# Patient Record
Sex: FEMALE | Race: WHITE | NOT HISPANIC OR LATINO | Employment: OTHER | URBAN - METROPOLITAN AREA
[De-identification: names, ages, dates, MRNs, and addresses within clinical notes are randomized per-mention and may not be internally consistent; named-entity substitution may affect disease eponyms.]

---

## 2017-01-06 ENCOUNTER — TELEPHONE (OUTPATIENT)
Dept: VASCULAR LAB | Facility: MEDICAL CENTER | Age: 82
End: 2017-01-06

## 2017-01-06 NOTE — TELEPHONE ENCOUNTER
Pt states she is in [place] for the next few months.  She states she has an MD there that is taking the INR and dosing her.  She expects to be back in [place] around May of this year.  Will contact pt in 1-2 months and follow up her therapy is still being monitored by her MD.    Aly Escobedo, KPD

## 2017-03-02 ENCOUNTER — TELEPHONE (OUTPATIENT)
Dept: VASCULAR LAB | Facility: MEDICAL CENTER | Age: 82
End: 2017-03-02

## 2017-03-02 NOTE — TELEPHONE ENCOUNTER
[facility]    Confirmed with pt she is seeing an MD to adjust her warfarin.  She states she will be back early May and will obtain another INR at that time.    Aly Escobedo, KPD

## 2017-05-12 ENCOUNTER — ANTICOAGULATION MONITORING (OUTPATIENT)
Dept: VASCULAR LAB | Facility: MEDICAL CENTER | Age: 82
End: 2017-05-12

## 2017-05-12 NOTE — PROGRESS NOTES
[facility]    Called pt to confirm if she has moved back to the area or if the clinic could resume monitoring of INR.  Pt states she is staying in [place] and will continue to have warfarin monitored by her MD at her current location.    Pt d/c'd from [facility].    Aly Escobedo, PHARMD   CC: Michael Bloch, MD

## 2019-07-08 ENCOUNTER — OFFICE VISIT (OUTPATIENT)
Dept: URGENT CARE | Facility: PHYSICIAN GROUP | Age: 84
End: 2019-07-08
Payer: MEDICARE

## 2019-07-08 VITALS
OXYGEN SATURATION: 91 % | BODY MASS INDEX: 20.24 KG/M2 | TEMPERATURE: 97.6 F | WEIGHT: 110 LBS | DIASTOLIC BLOOD PRESSURE: 64 MMHG | HEART RATE: 78 BPM | SYSTOLIC BLOOD PRESSURE: 118 MMHG | HEIGHT: 62 IN

## 2019-07-08 DIAGNOSIS — A09 DIARRHEA, TRAVELERS': ICD-10-CM

## 2019-07-08 PROCEDURE — 99203 OFFICE O/P NEW LOW 30 MIN: CPT | Performed by: NURSE PRACTITIONER

## 2019-07-08 RX ORDER — FUROSEMIDE 40 MG/1
40 TABLET ORAL DAILY
COMMUNITY
Start: 2019-06-10 | End: 2021-07-29 | Stop reason: SDUPTHER

## 2019-07-08 RX ORDER — CIPROFLOXACIN 250 MG/1
250 TABLET, FILM COATED ORAL 2 TIMES DAILY
Qty: 6 TAB | Refills: 0 | Status: SHIPPED | OUTPATIENT
Start: 2019-07-08 | End: 2019-07-11

## 2019-07-08 RX ORDER — WARFARIN SODIUM 4 MG/1
TABLET ORAL
COMMUNITY
Start: 2019-04-26 | End: 2020-10-03

## 2019-07-08 ASSESSMENT — ENCOUNTER SYMPTOMS
VOMITING: 0
HEADACHES: 0
ABDOMINAL PAIN: 1
FEVER: 0
DIARRHEA: 1
NAUSEA: 0
DIZZINESS: 0
MYALGIAS: 0
CHILLS: 0

## 2019-07-08 NOTE — PROGRESS NOTES
"Subjective:      Lindsay Landrum is a 86 y.o. female who presents with Diarrhea (travel outside the country x6days)            HPI New. 86 year old female with diarrhea x 6 days since returning from trip to [facility]. She denies fever, chills, myalgia, nausea or vomiting. She has no blood or pus in stool. She reports abdominal cramping with this. No other sick contacts. She has not taken any medication for this. Reports decreased appetite.  Patient has no known allergies.  Current Outpatient Prescriptions on File Prior to Visit   Medication Sig Dispense Refill   • levothyroxine (SYNTHROID) 50 MCG Tab Take 1 Tab by mouth every day. 30 Tab 0   • lovastatin (MEVACOR) 20 MG Tab Take 1 Tab by mouth every bedtime. 30 Tab 0   • warfarin (COUMADIN) 5 MG TABS Take 1 -1 1/2 tablets daily or as directed by [facility]. 90 Tab 4     No current facility-administered medications on file prior to visit.      Social History     Social History   • Marital status: Single     Spouse name: N/A   • Number of children: N/A   • Years of education: N/A     Occupational History   • Not on file.     Social History Main Topics   • Smoking status: Never Smoker   • Smokeless tobacco: Never Used   • Alcohol use Yes   • Drug use: No   • Sexual activity: Not on file     Other Topics Concern   • Not on file     Social History Narrative   • No narrative on file     family history is not on file.      Review of Systems   Constitutional: Negative for chills and fever.   Gastrointestinal: Positive for abdominal pain and diarrhea. Negative for nausea and vomiting.   Genitourinary: Negative.    Musculoskeletal: Negative for myalgias.   Neurological: Negative for dizziness and headaches.          Objective:     /64   Pulse 78   Temp 36.4 °C (97.6 °F) (Temporal)   Ht 1.575 m (5' 2\")   Wt 49.9 kg (110 lb)   SpO2 91%   BMI 20.12 kg/m²      Physical Exam   Constitutional: She is oriented to person, place, and time. She appears well-developed and " well-nourished. No distress.   Cardiovascular: Normal rate, regular rhythm and normal heart sounds.    No murmur heard.  Pulmonary/Chest: Effort normal and breath sounds normal. No respiratory distress.   Abdominal: Soft. Bowel sounds are normal. There is no tenderness. There is no CVA tenderness.   Musculoskeletal: Normal range of motion.   Moves all 4 extremities normally   Neurological: She is alert and oriented to person, place, and time.   Skin: Skin is warm and dry.   Psychiatric: She has a normal mood and affect. Her behavior is normal. Thought content normal.   Vitals reviewed.              Assessment/Plan:     1. Diarrhea, travelers'  ciprofloxacin (CIPRO) 250 MG Tab     No longer on coumadin.  Differential diagnosis, natural history, supportive care, and indications for immediate follow-up discussed at length.

## 2020-07-01 ENCOUNTER — APPOINTMENT (OUTPATIENT)
Dept: RADIOLOGY | Facility: MEDICAL CENTER | Age: 85
End: 2020-07-01
Attending: EMERGENCY MEDICINE
Payer: MEDICARE

## 2020-07-01 ENCOUNTER — HOSPITAL ENCOUNTER (EMERGENCY)
Facility: MEDICAL CENTER | Age: 85
End: 2020-07-01
Attending: EMERGENCY MEDICINE
Payer: MEDICARE

## 2020-07-01 ENCOUNTER — APPOINTMENT (OUTPATIENT)
Dept: URGENT CARE | Facility: CLINIC | Age: 85
End: 2020-07-01
Payer: MEDICARE

## 2020-07-01 VITALS
SYSTOLIC BLOOD PRESSURE: 115 MMHG | OXYGEN SATURATION: 99 % | HEIGHT: 62 IN | BODY MASS INDEX: 20.69 KG/M2 | RESPIRATION RATE: 16 BRPM | TEMPERATURE: 97.9 F | WEIGHT: 112.43 LBS | HEART RATE: 65 BPM | DIASTOLIC BLOOD PRESSURE: 57 MMHG

## 2020-07-01 DIAGNOSIS — L03.313 CELLULITIS OF CHEST WALL: ICD-10-CM

## 2020-07-01 DIAGNOSIS — Z51.89 ENCOUNTER FOR WOUND RE-CHECK: ICD-10-CM

## 2020-07-01 PROCEDURE — 99283 EMERGENCY DEPT VISIT LOW MDM: CPT

## 2020-07-01 PROCEDURE — 71045 X-RAY EXAM CHEST 1 VIEW: CPT

## 2020-07-01 RX ORDER — AMOXICILLIN 500 MG/1
1000 CAPSULE ORAL 2 TIMES DAILY
Qty: 20 CAP | Refills: 0 | Status: SHIPPED | OUTPATIENT
Start: 2020-07-01 | End: 2020-07-06

## 2020-07-01 RX ORDER — AMOXICILLIN 500 MG/1
1000 CAPSULE ORAL 2 TIMES DAILY
Qty: 20 CAP | Refills: 0 | Status: SHIPPED | OUTPATIENT
Start: 2020-07-01 | End: 2020-07-01 | Stop reason: SDUPTHER

## 2020-07-01 NOTE — ED PROVIDER NOTES
ED Provider Note    Scribed for Brittnee Beyer M.D. by Quinton Meng. 7/1/2020, 10:04 AM.    Primary care provider: None noted  Means of arrival: Walk in  History obtained from: Patient  History limited by: None    CHIEF COMPLAINT  Chief Complaint   Patient presents with   • Wound Check       HPI  Lindsay Landrum is a 87 y.o. female with history of Atrial fibrillation, hyperlipidemia, and chronic anticoagulation, who presents to the Emergency Department for evaluation of moderate itchiness to left chest incisional site of old pacemaker onset last night. She reports she had her pacemaker removed in [place] on 4/15/20 secondary to infection in her left chest and had a new one placed in her right groin. She states that she last had her incision site checked in mid-May, at which time the incision was draining fluid. She is unsure who her Cardiologist is, but says she was followed by Dr. Meza (Cardiology) and Dr. Connelly (Cardiology) in the past. She has an appointment with a new Cardiologist in August, 2020 for pacemaker recheck. She admits to additional symptoms of left chest incision site redness (last night), and a scab to the right groin incision site, but denies fever or nausea. She notes alleviation of itchiness with distraction. She denies known drug allergies.     PPE Note: I personally donned full PPE for all patient encounters during this visit, including an N95 respirator mask, gloves, and goggles.     Scribe remained outside the patient's room and did not have any contact with the patient for the duration of patient encounter.      REVIEW OF SYSTEMS  Pertinent positives include itchiness to left chest incisional site of old pacemaker, left chest incision site redness (last night), and a scab to the right groin incision site. Pertinent negatives include no fever or nausea. As above, all other systems reviewed and are negative.   See HPI for further details.     PAST MEDICAL HISTORY  Past Medical  "History:   Diagnosis Date   • AF (atrial fibrillation) (HCC) 5/21/2012   • Chronic anticoagulation 5/21/2012   • Hyperlipidemia 5/21/2012   • Hypothyroidism 5/21/2012   • Vitamin d deficiency 5/21/2012       SURGICAL HISTORY  History reviewed. No pertinent surgical history.    SOCIAL HISTORY  Social History     Tobacco Use   • Smoking status: Never Smoker   • Smokeless tobacco: Never Used   Substance Use Topics   • Alcohol use: Yes   • Drug use: No      Social History     Substance and Sexual Activity   Drug Use No       FAMILY HISTORY  History reviewed. No pertinent family history.    CURRENT MEDICATIONS  Current Outpatient Medications:   •  Potassium 75 MG Tab, Take  by mouth., Disp: , Rfl:   •  furosemide (LASIX) 40 MG Tab, , Disp: , Rfl:   •  JANTOVEN 4 MG Tab, , Disp: , Rfl:   •  levothyroxine (SYNTHROID) 50 MCG Tab, Take 1 Tab by mouth every day., Disp: 30 Tab, Rfl: 0  •  lovastatin (MEVACOR) 20 MG Tab, Take 1 Tab by mouth every bedtime., Disp: 30 Tab, Rfl: 0  •  warfarin (COUMADIN) 5 MG TABS, Take 1 -1 1/2 tablets daily or as directed by [facility]., Disp: 90 Tab, Rfl: 4     ALLERGIES  No Known Allergies    PHYSICAL EXAM  VITAL SIGNS: /57   Pulse 62   Temp 36.7 °C (98.1 °F) (Temporal)   Resp 16   Ht 1.575 m (5' 2\")   Wt 51 kg (112 lb 7 oz)   SpO2 100%   BMI 20.56 kg/m²   Vitals reviewed.  Consitutional: Well-developed, well-nourished. Negative for: distress.  HENT: Normocephalic, right external ear normal, left external ear normal, oropharynx clear and moist.  Eyes: Conjunctivae normal, extraocular movements normal. Negative for: discharge in right and left eye, icterus.  Neck: Range of motion normal, supple. Negative for cervical adenopathy.  Abdominal: Soft, bowel sounds normal. Negative for: distention, tenderness, rebound, guarding.  Musculoskeletal: Normal range of motion. Negative for edema.  Neurological: Alert and oriented x3. No focal deficits.  Skin: Left groin incision has slight " "crusting, no erythema or induration, and no active discharge;The pocket is not boggy, tender, or hot. Surgical incision to left upper chest is warm, erythematous, scaly, and there appears to be an area of dehiscence with no active drainage.   Psych: Mood/affect normal, behavior normal, judgment normal.    DIAGNOSTIC STUDIES / PROCEDURES    RADIOLOGY  DX-CHEST-PORTABLE (1 VIEW)   Final Result      1.  Mild cardiomegaly.      2.  Blunted right costophrenic angle likely represents a small pleural effusion.        The radiologist's interpretation of all radiological studies have been reviewed by me.    COURSE & MEDICAL DECISION MAKING  Nursing notes, VS, PMSFHx reviewed in chart.    10:04 AM Patient seen and examined at bedside. The patient presents with possible wound infection and the differential diagnosis includes but is not limited to retained hardware, cellulitis. Discussed my plan to consult Cardiology. Patient verbalizes support and understanding with the plan of care.     10:17 AM - Paged Cardiology.      10:21 AM - I discussed the patient's case and the above findings with Dr. Schuler (Cardiology) who recommended I further evaluate the patient with a chest x-ray to check for any retained metal, as the surgery was not completed here. She agrees for the plan for outpatient antibiotic treatment if the chest x-ray is clear.     10:25 AM - Ordered DX-Chest.     10:49 AM - I reevaluated the patient at bedside. I discussed the patient's chest x-ray, which is reassuring. I discussed plan for discharge and follow up with Dr. Schuler as outlined below, with prescriptions for amoxicillin. The patient verbalizes they feel comfortable going home. The patient is stable for discharge at this time and will return for any new or worsening symptoms. Patient verbalizes understanding and support with my plan for discharge.      /57   Pulse 62   Temp 36.7 °C (98.1 °F) (Temporal)   Resp 16   Ht 1.575 m (5' 2\")   Wt 51 kg " (112 lb 7 oz)   SpO2 100%   BMI 20.56 kg/m²      The patient will return for new or worsening symptoms and is stable at the time of discharge.    DISPOSITION:  Patient will be discharged home in stable condition.    FOLLOW UP:  Areltte Schuler M.D.  [address]  [address]  [place] [address]  [phone]    Schedule an appointment as soon as possible for a visit   For wound re-check      OUTPATIENT MEDICATIONS:  Current Discharge Medication List      START taking these medications    Details   amoxicillin (AMOXIL) 500 MG Cap Take 2 Caps by mouth 2 times a day for 5 days.  Qty: 20 Cap, Refills: 0              FINAL IMPRESSION  1. Cellulitis of chest wall    2. Encounter for wound re-check          Quinton MCCARTHY (Sammy), am scribing for, and in the presence of, Brittnee Beyer M.D..    Electronically signed by: Quinton Meng (Sammy), 7/1/2020    Brittnee MCCARTHY M.D. personally performed the services described in this documentation, as scribed by Quinton Meng in my presence, and it is both accurate and complete.    The note accurately reflects work and decisions made by me.  Brittnee Beyer M.D.  7/1/2020  10:56 AM

## 2020-07-01 NOTE — ED TRIAGE NOTES
"Pt ambulatory to triage, pt c/o \" incisional pain to left old pacemaker insertion site\" . Pt had her Pacemaker removed April 15th from her left chest due to infection and placed to rt groin. Last had her incision site checked in May, and it was \" draining at the time\" , but she thought by now it would better... Had the surgery done in [place]. . Sl erythema noted to left chest wall incision. Pt denies Chest pains or other concerns   "

## 2020-07-01 NOTE — ED NOTES
Pt given discharge instructions/prescription sent to pharm of choosing/ home care instructions explained, pt verbalized understanding of instructions given/pt understands the importance of follow up, pt ambulatory to ER james.

## 2020-07-01 NOTE — ED NOTES
"Pt steady on ambulation to Er 62. Pt changing into gown at this time. Pt has \"old pacemaker site to be checked on left chest area and new site on right groin\". Pt has surgery done in April 2020 and wanted a recheck. Skin on left chest appears dry and flaky. NAD noted. VSS in triage   "

## 2020-07-02 ENCOUNTER — TELEPHONE (OUTPATIENT)
Dept: CARDIOLOGY | Facility: MEDICAL CENTER | Age: 85
End: 2020-07-02

## 2020-07-02 NOTE — TELEPHONE ENCOUNTER
----- Message from [facility] Med [facility] sent at 7/2/2020 11:30 AM PDT -----  Regarding: FW: 1 WEEK WOUND CHECK  Delbert Platt,    Here is the patient that will need to be scheduled with EP-- ?? Of Micra implant s/p device extraction done in [place].    Wyatt Gallegos  ----- Message -----  From: Corina Ba  Sent: 7/1/2020   1:55 PM PDT  To: [facility]  Subject: 1 WEEK WOUND CHECK                               Good Afternoon,          Patient had pacer placed in [place]. She is now in [place] and was just seen in ER for her wound. ER informed patient to have a wound check within a week. Patient is not established with office but has appt in aug 5 for pacer check and NP appt. Is there any way we can get patient in within a week to get this done?      Thank you,  Corina

## 2020-07-09 ENCOUNTER — OFFICE VISIT (OUTPATIENT)
Dept: CARDIOLOGY | Facility: MEDICAL CENTER | Age: 85
End: 2020-07-09
Payer: MEDICARE

## 2020-07-09 ENCOUNTER — DOCUMENTATION (OUTPATIENT)
Dept: CARDIOLOGY | Facility: MEDICAL CENTER | Age: 85
End: 2020-07-09

## 2020-07-09 VITALS
HEART RATE: 72 BPM | DIASTOLIC BLOOD PRESSURE: 70 MMHG | SYSTOLIC BLOOD PRESSURE: 130 MMHG | OXYGEN SATURATION: 95 % | BODY MASS INDEX: 20.12 KG/M2 | WEIGHT: 110 LBS

## 2020-07-09 DIAGNOSIS — I48.11 LONGSTANDING PERSISTENT ATRIAL FIBRILLATION (HCC): Chronic | ICD-10-CM

## 2020-07-09 PROCEDURE — 99204 OFFICE O/P NEW MOD 45 MIN: CPT | Mod: 25 | Performed by: INTERNAL MEDICINE

## 2020-07-09 PROCEDURE — 93279 PRGRMG DEV EVAL PM/LDLS PM: CPT | Performed by: INTERNAL MEDICINE

## 2020-07-09 RX ORDER — DOXYCYCLINE HYCLATE 50 MG/1
CAPSULE ORAL
COMMUNITY
Start: 2020-05-01 | End: 2020-08-12

## 2020-07-09 NOTE — PROGRESS NOTES
Request for records from Dr.Leon Arvizu office  P:[phone]  F:[phone]    Confirmation scanned to media    Request for All cardiac records/Implant/explant of device    Patient has Device implanted in groin area (right side) [facility]

## 2020-07-09 NOTE — PROGRESS NOTES
"Arrhythmia Clinic Note (New patient)     DOS: 7/9/2020    Referring physician: Dr Beyer    Chief complaint/Reason for consult: Device site pain    HPI: 87-year-old female with chronic atrial fibrillation, slow ventricular response, status post dual-chamber pacemaker earlier this year, possible early pacemaker infection with extraction, right groin pacemaker implanted (transvenous lead with generator subcutaneous in low abdomen), referred for evaluation.  Patient is not certain of the dates and indications for her procedures, however she notes that she believes her first implanted pacemaker was in April and was a dual-chamber pacemaker to left chest.  Due to poor wound healing, she notes that this was removed.  She says that her physicians told her she was unable to have a pacemaker implanted in the right axillary vein due to \" vein tortuosity\".  She proceeded to have right groin pacemaker implanted.  Since this procedure, she has noted that she has had some left shoulder itching and redness at her prior incision site, she was placed on oral antibiotics last week and this has improved.  Sometimes she gets sharp stinging sensation there which lasts only a second, she says she is not worried by the pain.  She does have some discomfort as she is thin regarding her right groin generator, and is wondering what she can have done about this.    ROS (+ highlighted in bold):  Constitutional: Fevers/chills/fatigue/weightloss  HEENT: Blurry vision/eye pain/sore throat/hearing loss  Respiratory: Shortness of breath/cough  Cardiovascular: Chest pain/palpitations/edema/orthopnea/syncope  GI: Nausea/vomitting/diarrhea  MSK: Arthralgias/myagias/muscle weakness  Skin: Rash/sores  Neurological: Numbness/tremors/vertigo  Endocrine: Excessive thirst/polyuria/cold intolerance/heat intolerance  Psych: Depression/anxiety    Past Medical History:   Diagnosis Date   • AF (atrial fibrillation) ([facility]) 5/21/2012   • Chronic anticoagulation " 5/21/2012   • Hyperlipidemia 5/21/2012   • Hypothyroidism 5/21/2012   • Vitamin d deficiency 5/21/2012       No past surgical history on file.    Social History     Socioeconomic History   • Marital status: Single     Spouse name: Not on file   • Number of children: Not on file   • Years of education: Not on file   • Highest education level: Not on file   Occupational History   • Not on file   Social Needs   • Financial resource strain: Not on file   • Food insecurity     Worry: Not on file     Inability: Not on file   • Transportation needs     Medical: Not on file     Non-medical: Not on file   Tobacco Use   • Smoking status: Never Smoker   • Smokeless tobacco: Never Used   Substance and Sexual Activity   • Alcohol use: Yes   • Drug use: No   • Sexual activity: Not on file   Lifestyle   • Physical activity     Days per week: Not on file     Minutes per session: Not on file   • Stress: Not on file   Relationships   • Social connections     Talks on phone: Not on file     Gets together: Not on file     Attends Zoroastrian service: Not on file     Active member of club or organization: Not on file     Attends meetings of clubs or organizations: Not on file     Relationship status: Not on file   • Intimate partner violence     Fear of current or ex partner: Not on file     Emotionally abused: Not on file     Physically abused: Not on file     Forced sexual activity: Not on file   Other Topics Concern   • Not on file   Social History Narrative   • Not on file       No family history on file.    No Known Allergies    Current Outpatient Medications   Medication Sig Dispense Refill   • doxycycline (VIBRAMYCIN) 50 MG capsule      • Potassium 75 MG Tab Take  by mouth.     • furosemide (LASIX) 40 MG Tab      • JANTOVEN 4 MG Tab      • levothyroxine (SYNTHROID) 50 MCG Tab Take 1 Tab by mouth every day. 30 Tab 0   • lovastatin (MEVACOR) 20 MG Tab Take 1 Tab by mouth every bedtime. 30 Tab 0   • warfarin (COUMADIN) 5 MG TABS Take  1 -1 1/2 tablets daily or as directed by [facility]mad[facility] Clinic. 90 Tab 4     No current facility-administered medications for this visit.        Physical Exam:  Vitals:    07/09/20 1144   BP: 130/70   BP Location: Left arm   Patient Position: Sitting   BP Cuff Size: Adult   Pulse: 72   SpO2: 95%   Weight: 49.9 kg (110 lb)     General appearance: NAD, conversant   Eyes: anicteric sclerae, moist conjunctivae; no lid-lag; PERRLA  HENT: Atraumatic; oropharynx clear with moist mucous membranes and no mucosal ulcerations; normal hard and soft palate  Neck: Trachea midline; FROM, supple, no thyromegaly or lymphadenopathy  Lungs: CTA, with normal respiratory effort and no intercostal retractions  CV: RRR, no MRGs, no JVD   Abdomen: Soft, non-tender; no masses or HSM  Extremities: No peripheral edema or extremity lymphadenopathy  Skin: Normal temperature, turgor and texture; no rash, ulcers or subcutaneous nodules  Psych: Appropriate affect, alert and oriented to person, place and time    Data:  Lipids:   No results found for: CHOLSTRLTOT, TRIGLYCERIDE, HDL, LDL     BMP:  No results found for: SODIUM, POTASSIUM, CHLORIDE, CO2, GLUCOSE, BUN, CREATININE, CALCIUM, ANION     TSH:   No results found for: TSHULTRASEN     THYROXINE (T4):   No results found for: FREEDIR     CBC: No results found for: WBC, RBC, HEMOGLOBIN, HEMATOCRIT, MCV, MCH, MCHC, RDW, PLATELETCT, MPV, NEUTSPOLYS, LYMPHOCYTES, MONOCYTES, EOSINOPHILS, BASOPHILS, IMMGRAN, NRBC, NEUTS, LYMPHS, MONOS, EOS, BASO, IMMGRANAB, NRBCAB     CBC w/o DIFF  No results found for: WBC, RBC, HEMOGLOBIN, MCV, MCH, MCHC, RDW, MPV    Device interrogation performed and interpreted by me: Chronic atrial fibrillation with slow ventricular response, normal lead parameters    Impression/Plan:  1.  Chronic atrial fibrillation with slow ventricular response  2.  Pacemaker site infection with extraction  3.  Abdominal/pelvic generator implantation with transvenous lead insertion    -Continue  warfarin for atrial fibrillation  -Unclear why she has a pacemaker implanted in her lower abdomen.  Presumably she had some degree of right axillary occlusion which prevented transvenous pacemaker implantation at this location.  However, it is unclear why leadless pacemaker was not implanted.  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  Either way, she is interested in relocating this pacemaker generator.  I will try to obtain more information about her generator implant in April.  This was done by Dr. Arvizu in [place].  I will reach out to his office and see what more information I can gather.   -Continue course of oral antibiotics for cellulitis at prior extraction site    Follow-up in 2 weeks to further assess what her options are in terms of potential leadless pacemaker insertion and extraction of her current transvenous system.    Sukh Zamorano MD  Cardiac Electrophysiology

## 2020-07-23 ENCOUNTER — OFFICE VISIT (OUTPATIENT)
Dept: CARDIOLOGY | Facility: MEDICAL CENTER | Age: 85
End: 2020-07-23
Payer: MEDICARE

## 2020-07-23 ENCOUNTER — TELEPHONE (OUTPATIENT)
Dept: CARDIOLOGY | Facility: MEDICAL CENTER | Age: 85
End: 2020-07-23

## 2020-07-23 VITALS
HEART RATE: 66 BPM | DIASTOLIC BLOOD PRESSURE: 74 MMHG | SYSTOLIC BLOOD PRESSURE: 122 MMHG | WEIGHT: 109 LBS | BODY MASS INDEX: 19.94 KG/M2 | OXYGEN SATURATION: 95 %

## 2020-07-23 DIAGNOSIS — Z95.818 STATUS POST IMPLANTATION OF MITRAL VALVE LEAFLET CLIP: ICD-10-CM

## 2020-07-23 DIAGNOSIS — I34.0 MITRAL VALVE INSUFFICIENCY, UNSPECIFIED ETIOLOGY: ICD-10-CM

## 2020-07-23 DIAGNOSIS — Z98.890 STATUS POST IMPLANTATION OF MITRAL VALVE LEAFLET CLIP: ICD-10-CM

## 2020-07-23 DIAGNOSIS — I48.11 LONGSTANDING PERSISTENT ATRIAL FIBRILLATION (HCC): Chronic | ICD-10-CM

## 2020-07-23 DIAGNOSIS — I48.11 LONGSTANDING PERSISTENT ATRIAL FIBRILLATION (HCC): ICD-10-CM

## 2020-07-23 PROCEDURE — 99215 OFFICE O/P EST HI 40 MIN: CPT | Performed by: INTERNAL MEDICINE

## 2020-07-23 RX ORDER — SPIRONOLACTONE 25 MG/1
25 TABLET ORAL DAILY
COMMUNITY
Start: 2020-05-09 | End: 2021-07-29 | Stop reason: SDUPTHER

## 2020-07-23 RX ORDER — FOLIC ACID 1 MG/1
1 TABLET ORAL DAILY
COMMUNITY
Start: 2020-06-09

## 2020-07-23 RX ORDER — LEVOTHYROXINE SODIUM 0.07 MG/1
TABLET ORAL
COMMUNITY
Start: 2020-06-21 | End: 2020-09-30

## 2020-07-23 RX ORDER — DILTIAZEM HYDROCHLORIDE 240 MG/1
240 CAPSULE, EXTENDED RELEASE ORAL EVERY MORNING
COMMUNITY
Start: 2020-07-20 | End: 2021-07-29 | Stop reason: SDUPTHER

## 2020-07-23 NOTE — TELEPHONE ENCOUNTER
"Dr. Zamorano,    This patient has some concerns about her mitral clip \"not working\". She is wondering if there is anything we can do for that.    Please advise.    Thank You,  Giulia"

## 2020-07-23 NOTE — TELEPHONE ENCOUNTER
Sukh Zamorano M.D.  You; Giulia Valdez, Med Ass't 29 minutes ago (12:44 PM)      I will order an echo to assess her mitral regurgitation post-mitral clip, if there is still significant mitral regurgitation I can refer her to Dr Génesis ESTEVES      Pt updated and given imaging scheduling number

## 2020-07-23 NOTE — PROGRESS NOTES
Arrhythmia Clinic Note (Established patient)    DOS: 7/23/2020    Chief complaint/Reason for consult: Pacemaker site discomfort    Interval History: She was seen a couple weeks ago with complaints of pacemaker site discomfort.  She had a left-sided pacemaker implanted in [place], which was extracted quickly following apparent poor wound healing, this could not be implanted on the right side, presumably due to occlusion but unclear, and instead right groin lower abdominal pacemaker was placed.  This is uncomfortable for her and she would like to have a leadless pacemaker placed and have this pacemaker removed.  No new symptoms since last visit.    ROS (+ highlighted in bold):  Constitutional: Fevers/chills/fatigue/weightloss  HEENT: Blurry vision/eye pain/sore throat/hearing loss  Respiratory: Shortness of breath/cough  Cardiovascular: Chest pain/palpitations/edema/orthopnea/syncope  GI: Nausea/vomitting/diarrhea  MSK: Arthralgias/myagias/muscle weakness  Skin: Rash/sores  Neurological: Numbness/tremors/vertigo  Endocrine: Excessive thirst/polyuria/cold intolerance/heat intolerance  Psych: Depression/anxiety    Past Medical History:   Diagnosis Date   • AF (atrial fibrillation) ([facility]) 5/21/2012   • Chronic anticoagulation 5/21/2012   • Hyperlipidemia 5/21/2012   • Hypothyroidism 5/21/2012   • Vitamin d deficiency 5/21/2012       No past surgical history on file.    Social History     Socioeconomic History   • Marital status: Single     Spouse name: Not on file   • Number of children: Not on file   • Years of education: Not on file   • Highest education level: Not on file   Occupational History   • Not on file   Social Needs   • Financial resource strain: Not on file   • Food insecurity     Worry: Not on file     Inability: Not on file   • Transportation needs     Medical: Not on file     Non-medical: Not on file   Tobacco Use   • Smoking status: Never Smoker   • Smokeless tobacco: Never Used   Substance and  Sexual Activity   • Alcohol use: Yes   • Drug use: No   • Sexual activity: Not on file   Lifestyle   • Physical activity     Days per week: Not on file     Minutes per session: Not on file   • Stress: Not on file   Relationships   • Social connections     Talks on phone: Not on file     Gets together: Not on file     Attends Shinto service: Not on file     Active member of club or organization: Not on file     Attends meetings of clubs or organizations: Not on file     Relationship status: Not on file   • Intimate partner violence     Fear of current or ex partner: Not on file     Emotionally abused: Not on file     Physically abused: Not on file     Forced sexual activity: Not on file   Other Topics Concern   • Not on file   Social History Narrative   • Not on file       No family history on file.   No family history of premature coronary disease    No Known Allergies    Current Outpatient Medications   Medication Sig Dispense Refill   • diltiazem (TIAZAC) 240 MG SR capsule      • folic acid (FOLVITE) 1 MG Tab      • levothyroxine (SYNTHROID) 75 MCG Tab      • spironolactone (ALDACTONE) 25 MG Tab      • Potassium 75 MG Tab Take  by mouth.     • furosemide (LASIX) 40 MG Tab      • JANTOVEN 4 MG Tab      • lovastatin (MEVACOR) 20 MG Tab Take 1 Tab by mouth every bedtime. 30 Tab 0   • warfarin (COUMADIN) 5 MG TABS Take 1 -1 1/2 tablets daily or as directed by Coumadin Clinic. 90 Tab 4   • doxycycline (VIBRAMYCIN) 50 MG capsule      • levothyroxine (SYNTHROID) 50 MCG Tab Take 1 Tab by mouth every day. (Patient not taking: Reported on 7/23/2020) 30 Tab 0     No current facility-administered medications for this visit.        Physical Exam:  Vitals:    07/23/20 1122   BP: 122/74   BP Location: Left arm   Patient Position: Sitting   BP Cuff Size: Adult   Pulse: 66   SpO2: 95%   Weight: 49.4 kg (109 lb)     General appearance: NAD, conversant   Eyes: anicteric sclerae, moist conjunctivae; no lid-lag; PERRLA  HENT:  Atraumatic; oropharynx clear with moist mucous membranes and no mucosal ulcerations; normal hard and soft palate  Neck: Trachea midline; FROM, supple, no thyromegaly or lymphadenopathy  Lungs: CTA, with normal respiratory effort and no intercostal retractions  CV: RRR, no MRGs, no JVD  Abdomen: Soft, non-tender; no masses or HSM  Extremities: No peripheral edema or extremity lymphadenopathy  Skin: Normal temperature, turgor and texture; no rash, ulcers or subcutaneous nodules  Psych: Appropriate affect, alert and oriented to person, place and time    Data:  Lipids:   No results found for: CHOLSTRLTOT, TRIGLYCERIDE, HDL, LDL     BMP:  No results found for: SODIUM, POTASSIUM, CHLORIDE, CO2, GLUCOSE, BUN, CREATININE, CALCIUM, ANION     TSH:   No results found for: TSHULTRASEN     THYROXINE (T4):   No results found for: FREEDIR     CBC: No results found for: WBC, RBC, HEMOGLOBIN, HEMATOCRIT, MCV, MCH, MCHC, RDW, PLATELETCT, MPV, NEUTSPOLYS, LYMPHOCYTES, MONOCYTES, EOSINOPHILS, BASOPHILS, IMMGRAN, NRBC, NEUTS, LYMPHS, MONOS, EOS, BASO, IMMGRANAB, NRBCAB     CBC w/o DIFF  No results found for: WBC, RBC, HEMOGLOBIN, MCV, MCH, MCHC, RDW, MPV    Device interrogation interpreted by me: Appropriate device function    Impression/Plan:  1. Longstanding persistent atrial fibrillation (HCC)  CBC W/O DIFF OR PLATELETS    Comp Metabolic Panel    Prothrombin Time   1.  Persistent atrial fibrillation  2.  Slow ventricular response, bradycardia, heart block  3.  Status post pacemaker implantation  4.  Pacemaker site pain and discomfort    -I have spoken with Dr. Arvizu who implanted the device in [place].  This was implanted in lieu of leadless pacemaker because of low experience with leadless pacemaker implantation at that site.  As such, I think she would be a good candidate for leadless pacemaker implantation, and extraction of her current transvenous pacemaker in her lower abdomen.  -Risk and benefits of leadless pacemaker  implantation were discussed, including bleeding, myocardial damage, pulmonary embolism, device embolism, pericardial effusion, tamponade, and death.  Risk of pacemaker removal are discussed, risk of vascular injury, bleeding, and infection were discussed, given recent implantation site these overall risks are very low.  Overall major risk is less than 1%.  She understands the risks and benefits, reasons for the procedure, wishes to proceed.    Schedule transvenous pacemaker extraction from right abdomen and leadless pacemaker insertion.    Sukh Zamorano MD  Cardiac Electrophysiology

## 2020-07-28 ENCOUNTER — TELEPHONE (OUTPATIENT)
Dept: CARDIOLOGY | Facility: MEDICAL CENTER | Age: 85
End: 2020-07-28

## 2020-07-28 DIAGNOSIS — I48.11 LONGSTANDING PERSISTENT ATRIAL FIBRILLATION (HCC): ICD-10-CM

## 2020-07-28 DIAGNOSIS — I49.8 SLOW VENTRICULAR RESPONSE: ICD-10-CM

## 2020-07-28 DIAGNOSIS — Z45.018 ELECTIVE REPLACEMENT INDICATED FOR PACEMAKER: ICD-10-CM

## 2020-07-28 LAB
ALBUMIN SERPL-MCNC: 4.3 G/DL (ref 3.6–4.6)
ALBUMIN/GLOB SERPL: 1.5 {RATIO} (ref 1.2–2.2)
ALP SERPL-CCNC: 100 IU/L (ref 39–117)
ALT SERPL-CCNC: 13 IU/L (ref 0–32)
AST SERPL-CCNC: 23 IU/L (ref 0–40)
BILIRUB SERPL-MCNC: 0.6 MG/DL (ref 0–1.2)
BUN SERPL-MCNC: 32 MG/DL (ref 8–27)
BUN/CREAT SERPL: 18 (ref 12–28)
CALCIUM SERPL-MCNC: 9.2 MG/DL (ref 8.7–10.3)
CHLORIDE SERPL-SCNC: 99 MMOL/L (ref 96–106)
CO2 SERPL-SCNC: 22 MMOL/L (ref 20–29)
CREAT SERPL-MCNC: 1.79 MG/DL (ref 0.57–1)
ERYTHROCYTE [DISTWIDTH] IN BLOOD BY AUTOMATED COUNT: 15.6 % (ref 11.7–15.4)
GLOBULIN SER CALC-MCNC: 2.9 G/DL (ref 1.5–4.5)
GLUCOSE SERPL-MCNC: 105 MG/DL (ref 65–99)
HCT VFR BLD AUTO: 32.5 % (ref 34–46.6)
HGB BLD-MCNC: 10.4 G/DL (ref 11.1–15.9)
MCH RBC QN AUTO: 25.6 PG (ref 26.6–33)
MCHC RBC AUTO-ENTMCNC: 32 G/DL (ref 31.5–35.7)
MCV RBC AUTO: 80 FL (ref 79–97)
NRBC BLD AUTO-RTO: ABNORMAL %
POTASSIUM SERPL-SCNC: 4.6 MMOL/L (ref 3.5–5.2)
PROT SERPL-MCNC: 7.2 G/DL (ref 6–8.5)
RBC # BLD AUTO: 4.07 X10E6/UL (ref 3.77–5.28)
SODIUM SERPL-SCNC: 137 MMOL/L (ref 134–144)
WBC # BLD AUTO: 5.6 X10E3/UL (ref 3.4–10.8)

## 2020-07-28 NOTE — TELEPHONE ENCOUNTER
Patient scheduled for micra on 8-3-20 at [facility] with Dr. Zamorano.

## 2020-07-31 ENCOUNTER — HOSPITAL ENCOUNTER (OUTPATIENT)
Dept: CARDIOLOGY | Facility: MEDICAL CENTER | Age: 85
End: 2020-07-31
Attending: INTERNAL MEDICINE
Payer: MEDICARE

## 2020-07-31 DIAGNOSIS — I34.0 MITRAL VALVE INSUFFICIENCY, UNSPECIFIED ETIOLOGY: ICD-10-CM

## 2020-07-31 PROCEDURE — 93306 TTE W/DOPPLER COMPLETE: CPT

## 2020-07-31 NOTE — OR NURSING
COVID-19 Pre-Surgery Screenin. Do you have an undiagnosed respiratory illness or symptoms such as coughing or sneezing? no     • Onset of Sx: na    • Acute vs. chronic respiratory illness: na     2. Do you have an unexplained fever greater than 100.4 degrees Fahrenheit or 38 degrees Celsius? no  ?  3. Have you had direct exposure to a patient who tested positive for Covid-19? no    4. Patient informed of current visitation and mask policies by this RN.

## 2020-08-01 ENCOUNTER — OFFICE VISIT (OUTPATIENT)
Dept: ADMISSIONS | Facility: MEDICAL CENTER | Age: 85
End: 2020-08-01
Attending: INTERNAL MEDICINE
Payer: MEDICARE

## 2020-08-01 DIAGNOSIS — Z01.812 PRE-OPERATIVE LABORATORY EXAMINATION: ICD-10-CM

## 2020-08-01 LAB — COVID ORDER STATUS COVID19: NORMAL

## 2020-08-01 PROCEDURE — U0003 INFECTIOUS AGENT DETECTION BY NUCLEIC ACID (DNA OR RNA); SEVERE ACUTE RESPIRATORY SYNDROME CORONAVIRUS 2 (SARS-COV-2) (CORONAVIRUS DISEASE [COVID-19]), AMPLIFIED PROBE TECHNIQUE, MAKING USE OF HIGH THROUGHPUT TECHNOLOGIES AS DESCRIBED BY CMS-2020-01-R: HCPCS

## 2020-08-02 LAB
LV EJECT FRACT  99904: 50
LV EJECT FRACT MOD 2C 99903: 55.53
LV EJECT FRACT MOD 4C 99902: 54.36
LV EJECT FRACT MOD BP 99901: 54.63
SARS-COV-2 RNA RESP QL NAA+PROBE: NOTDETECTED
SPECIMEN SOURCE: NORMAL

## 2020-08-02 PROCEDURE — 93306 TTE W/DOPPLER COMPLETE: CPT | Mod: 26 | Performed by: INTERNAL MEDICINE

## 2020-08-03 ENCOUNTER — ANESTHESIA (OUTPATIENT)
Dept: CARDIOLOGY | Facility: MEDICAL CENTER | Age: 85
End: 2020-08-03
Payer: MEDICARE

## 2020-08-03 ENCOUNTER — TELEPHONE (OUTPATIENT)
Dept: CARDIOLOGY | Facility: MEDICAL CENTER | Age: 85
End: 2020-08-03

## 2020-08-03 ENCOUNTER — HOSPITAL ENCOUNTER (OUTPATIENT)
Facility: MEDICAL CENTER | Age: 85
End: 2020-08-04
Attending: INTERNAL MEDICINE | Admitting: INTERNAL MEDICINE
Payer: MEDICARE

## 2020-08-03 ENCOUNTER — APPOINTMENT (OUTPATIENT)
Dept: CARDIOLOGY | Facility: MEDICAL CENTER | Age: 85
End: 2020-08-03
Attending: INTERNAL MEDICINE
Payer: MEDICARE

## 2020-08-03 ENCOUNTER — ANESTHESIA EVENT (OUTPATIENT)
Dept: CARDIOLOGY | Facility: MEDICAL CENTER | Age: 85
End: 2020-08-03
Payer: MEDICARE

## 2020-08-03 DIAGNOSIS — I49.8 SLOW VENTRICULAR RESPONSE: ICD-10-CM

## 2020-08-03 DIAGNOSIS — Z79.01 CHRONIC ANTICOAGULATION: Chronic | ICD-10-CM

## 2020-08-03 DIAGNOSIS — Z45.018 ELECTIVE REPLACEMENT INDICATED FOR PACEMAKER: ICD-10-CM

## 2020-08-03 DIAGNOSIS — I34.0 MITRAL VALVE INSUFFICIENCY, UNSPECIFIED ETIOLOGY: ICD-10-CM

## 2020-08-03 DIAGNOSIS — Z95.0 S/P PLACEMENT OF CARDIAC PACEMAKER: ICD-10-CM

## 2020-08-03 DIAGNOSIS — I34.0 SEVERE MITRAL REGURGITATION: ICD-10-CM

## 2020-08-03 DIAGNOSIS — I48.11 LONGSTANDING PERSISTENT ATRIAL FIBRILLATION (HCC): ICD-10-CM

## 2020-08-03 LAB
ALBUMIN SERPL BCP-MCNC: 4.4 G/DL (ref 3.2–4.9)
ALBUMIN/GLOB SERPL: 1.3 G/DL
ALP SERPL-CCNC: 91 U/L (ref 30–99)
ALT SERPL-CCNC: 14 U/L (ref 2–50)
ANION GAP SERPL CALC-SCNC: 17 MMOL/L (ref 7–16)
AST SERPL-CCNC: 26 U/L (ref 12–45)
BILIRUB SERPL-MCNC: 0.5 MG/DL (ref 0.1–1.5)
BUN SERPL-MCNC: 36 MG/DL (ref 8–22)
CALCIUM SERPL-MCNC: 9.1 MG/DL (ref 8.5–10.5)
CHLORIDE SERPL-SCNC: 97 MMOL/L (ref 96–112)
CO2 SERPL-SCNC: 24 MMOL/L (ref 20–33)
CREAT SERPL-MCNC: 1.51 MG/DL (ref 0.5–1.4)
EKG IMPRESSION: NORMAL
EKG IMPRESSION: NORMAL
ERYTHROCYTE [DISTWIDTH] IN BLOOD BY AUTOMATED COUNT: 50.1 FL (ref 35.9–50)
GLOBULIN SER CALC-MCNC: 3.3 G/DL (ref 1.9–3.5)
GLUCOSE SERPL-MCNC: 106 MG/DL (ref 65–99)
HCT VFR BLD AUTO: 34.9 % (ref 37–47)
HGB BLD-MCNC: 10.8 G/DL (ref 12–16)
INR PPP: 2.05 (ref 0.87–1.13)
MCH RBC QN AUTO: 24.9 PG (ref 27–33)
MCHC RBC AUTO-ENTMCNC: 30.9 G/DL (ref 33.6–35)
MCV RBC AUTO: 80.4 FL (ref 81.4–97.8)
PLATELET # BLD AUTO: 235 K/UL (ref 164–446)
PMV BLD AUTO: 9.8 FL (ref 9–12.9)
POTASSIUM SERPL-SCNC: 4.9 MMOL/L (ref 3.6–5.5)
PROT SERPL-MCNC: 7.7 G/DL (ref 6–8.2)
PROTHROMBIN TIME: 23.8 SEC (ref 12–14.6)
RBC # BLD AUTO: 4.34 M/UL (ref 4.2–5.4)
SODIUM SERPL-SCNC: 138 MMOL/L (ref 135–145)
WBC # BLD AUTO: 5.6 K/UL (ref 4.8–10.8)

## 2020-08-03 PROCEDURE — 33274 TCAT INSJ/RPL PERM LDLS PM: CPT | Mod: Q0 | Performed by: INTERNAL MEDICINE

## 2020-08-03 PROCEDURE — 33234 REMOVAL OF PACEMAKER SYSTEM: CPT

## 2020-08-03 PROCEDURE — A9270 NON-COVERED ITEM OR SERVICE: HCPCS

## 2020-08-03 PROCEDURE — 93005 ELECTROCARDIOGRAM TRACING: CPT | Performed by: INTERNAL MEDICINE

## 2020-08-03 PROCEDURE — 93010 ELECTROCARDIOGRAM REPORT: CPT | Performed by: INTERNAL MEDICINE

## 2020-08-03 PROCEDURE — C1894 INTRO/SHEATH, NON-LASER: HCPCS

## 2020-08-03 PROCEDURE — 160002 HCHG RECOVERY MINUTES (STAT)

## 2020-08-03 PROCEDURE — 33234 REMOVAL OF PACEMAKER SYSTEM: CPT | Performed by: INTERNAL MEDICINE

## 2020-08-03 PROCEDURE — A9270 NON-COVERED ITEM OR SERVICE: HCPCS | Performed by: INTERNAL MEDICINE

## 2020-08-03 PROCEDURE — 85027 COMPLETE CBC AUTOMATED: CPT

## 2020-08-03 PROCEDURE — 700105 HCHG RX REV CODE 258: Performed by: INTERNAL MEDICINE

## 2020-08-03 PROCEDURE — 700102 HCHG RX REV CODE 250 W/ 637 OVERRIDE(OP): Performed by: INTERNAL MEDICINE

## 2020-08-03 PROCEDURE — 700111 HCHG RX REV CODE 636 W/ 250 OVERRIDE (IP): Performed by: ANESTHESIOLOGY

## 2020-08-03 PROCEDURE — 700101 HCHG RX REV CODE 250

## 2020-08-03 PROCEDURE — 80053 COMPREHEN METABOLIC PANEL: CPT

## 2020-08-03 PROCEDURE — 700102 HCHG RX REV CODE 250 W/ 637 OVERRIDE(OP)

## 2020-08-03 PROCEDURE — 33233 REMOVAL OF PM GENERATOR: CPT | Performed by: INTERNAL MEDICINE

## 2020-08-03 PROCEDURE — 85610 PROTHROMBIN TIME: CPT

## 2020-08-03 PROCEDURE — G0378 HOSPITAL OBSERVATION PER HR: HCPCS

## 2020-08-03 PROCEDURE — 700111 HCHG RX REV CODE 636 W/ 250 OVERRIDE (IP)

## 2020-08-03 RX ORDER — OXYCODONE HCL 5 MG/5 ML
10 SOLUTION, ORAL ORAL
Status: DISCONTINUED | OUTPATIENT
Start: 2020-08-03 | End: 2020-08-03 | Stop reason: HOSPADM

## 2020-08-03 RX ORDER — CEFAZOLIN SODIUM 1 G/3ML
INJECTION, POWDER, FOR SOLUTION INTRAMUSCULAR; INTRAVENOUS
Status: COMPLETED
Start: 2020-08-03 | End: 2020-08-03

## 2020-08-03 RX ORDER — HYDROMORPHONE HYDROCHLORIDE 2 MG/ML
0.4 INJECTION, SOLUTION INTRAMUSCULAR; INTRAVENOUS; SUBCUTANEOUS
Status: DISCONTINUED | OUTPATIENT
Start: 2020-08-03 | End: 2020-08-03 | Stop reason: HOSPADM

## 2020-08-03 RX ORDER — LOVASTATIN 20 MG/1
20 TABLET ORAL
Status: DISCONTINUED | OUTPATIENT
Start: 2020-08-03 | End: 2020-08-04 | Stop reason: HOSPADM

## 2020-08-03 RX ORDER — DIPHENHYDRAMINE HYDROCHLORIDE 50 MG/ML
12.5 INJECTION INTRAMUSCULAR; INTRAVENOUS
Status: DISCONTINUED | OUTPATIENT
Start: 2020-08-03 | End: 2020-08-03 | Stop reason: HOSPADM

## 2020-08-03 RX ORDER — WARFARIN SODIUM 7.5 MG/1
7.5 TABLET ORAL DAILY
Status: DISCONTINUED | OUTPATIENT
Start: 2020-08-03 | End: 2020-08-04 | Stop reason: HOSPADM

## 2020-08-03 RX ORDER — TRAMADOL HYDROCHLORIDE 50 MG/1
50 TABLET ORAL EVERY 6 HOURS PRN
Status: DISCONTINUED | OUTPATIENT
Start: 2020-08-03 | End: 2020-08-04 | Stop reason: HOSPADM

## 2020-08-03 RX ORDER — CEFAZOLIN SODIUM 1 G/3ML
INJECTION, POWDER, FOR SOLUTION INTRAMUSCULAR; INTRAVENOUS PRN
Status: DISCONTINUED | OUTPATIENT
Start: 2020-08-03 | End: 2020-08-03 | Stop reason: SURG

## 2020-08-03 RX ORDER — HALOPERIDOL 5 MG/ML
1 INJECTION INTRAMUSCULAR
Status: DISCONTINUED | OUTPATIENT
Start: 2020-08-03 | End: 2020-08-03 | Stop reason: HOSPADM

## 2020-08-03 RX ORDER — HYDROMORPHONE HYDROCHLORIDE 2 MG/ML
0.1 INJECTION, SOLUTION INTRAMUSCULAR; INTRAVENOUS; SUBCUTANEOUS
Status: DISCONTINUED | OUTPATIENT
Start: 2020-08-03 | End: 2020-08-03 | Stop reason: HOSPADM

## 2020-08-03 RX ORDER — SODIUM CHLORIDE, SODIUM LACTATE, POTASSIUM CHLORIDE, CALCIUM CHLORIDE 600; 310; 30; 20 MG/100ML; MG/100ML; MG/100ML; MG/100ML
INJECTION, SOLUTION INTRAVENOUS CONTINUOUS
Status: DISCONTINUED | OUTPATIENT
Start: 2020-08-03 | End: 2020-08-04

## 2020-08-03 RX ORDER — OXYCODONE HCL 5 MG/5 ML
5 SOLUTION, ORAL ORAL
Status: DISCONTINUED | OUTPATIENT
Start: 2020-08-03 | End: 2020-08-03 | Stop reason: HOSPADM

## 2020-08-03 RX ORDER — HYDROMORPHONE HYDROCHLORIDE 2 MG/ML
0.2 INJECTION, SOLUTION INTRAMUSCULAR; INTRAVENOUS; SUBCUTANEOUS
Status: DISCONTINUED | OUTPATIENT
Start: 2020-08-03 | End: 2020-08-03 | Stop reason: HOSPADM

## 2020-08-03 RX ORDER — LIDOCAINE HYDROCHLORIDE 20 MG/ML
INJECTION, SOLUTION INFILTRATION; PERINEURAL
Status: COMPLETED
Start: 2020-08-03 | End: 2020-08-03

## 2020-08-03 RX ORDER — SODIUM CHLORIDE, SODIUM LACTATE, POTASSIUM CHLORIDE, CALCIUM CHLORIDE 600; 310; 30; 20 MG/100ML; MG/100ML; MG/100ML; MG/100ML
INJECTION, SOLUTION INTRAVENOUS CONTINUOUS
Status: ACTIVE | OUTPATIENT
Start: 2020-08-03 | End: 2020-08-03

## 2020-08-03 RX ORDER — SPIRONOLACTONE 25 MG/1
25 TABLET ORAL
Status: DISCONTINUED | OUTPATIENT
Start: 2020-08-04 | End: 2020-08-04 | Stop reason: HOSPADM

## 2020-08-03 RX ORDER — LEVOTHYROXINE SODIUM 0.07 MG/1
75 TABLET ORAL
Status: DISCONTINUED | OUTPATIENT
Start: 2020-08-04 | End: 2020-08-04 | Stop reason: HOSPADM

## 2020-08-03 RX ORDER — MEPERIDINE HYDROCHLORIDE 25 MG/ML
6.25 INJECTION INTRAMUSCULAR; INTRAVENOUS; SUBCUTANEOUS
Status: DISCONTINUED | OUTPATIENT
Start: 2020-08-03 | End: 2020-08-03 | Stop reason: HOSPADM

## 2020-08-03 RX ORDER — FUROSEMIDE 40 MG/1
40 TABLET ORAL
Status: DISCONTINUED | OUTPATIENT
Start: 2020-08-04 | End: 2020-08-04 | Stop reason: HOSPADM

## 2020-08-03 RX ORDER — HEPARIN SODIUM 200 [USP'U]/100ML
INJECTION, SOLUTION INTRAVENOUS
Status: COMPLETED
Start: 2020-08-03 | End: 2020-08-03

## 2020-08-03 RX ORDER — ONDANSETRON 2 MG/ML
4 INJECTION INTRAMUSCULAR; INTRAVENOUS
Status: DISCONTINUED | OUTPATIENT
Start: 2020-08-03 | End: 2020-08-03 | Stop reason: HOSPADM

## 2020-08-03 RX ORDER — DILTIAZEM HYDROCHLORIDE 240 MG/1
240 CAPSULE, COATED, EXTENDED RELEASE ORAL DAILY
Status: DISCONTINUED | OUTPATIENT
Start: 2020-08-04 | End: 2020-08-04 | Stop reason: HOSPADM

## 2020-08-03 RX ORDER — HEPARIN SODIUM,PORCINE 1000/ML
VIAL (ML) INJECTION
Status: COMPLETED
Start: 2020-08-03 | End: 2020-08-03

## 2020-08-03 RX ADMIN — HEPARIN SODIUM: 1000 INJECTION, SOLUTION INTRAVENOUS; SUBCUTANEOUS at 12:49

## 2020-08-03 RX ADMIN — TRAMADOL HYDROCHLORIDE 50 MG: 50 TABLET, FILM COATED ORAL at 23:56

## 2020-08-03 RX ADMIN — LIDOCAINE HYDROCHLORIDE: 20 INJECTION, SOLUTION INFILTRATION; PERINEURAL at 12:48

## 2020-08-03 RX ADMIN — POVIDONE-IODINE 15 ML: 10 SOLUTION TOPICAL at 10:48

## 2020-08-03 RX ADMIN — FENTANYL CITRATE 100 MCG: 50 INJECTION INTRAMUSCULAR; INTRAVENOUS at 11:58

## 2020-08-03 RX ADMIN — TRAMADOL HYDROCHLORIDE 50 MG: 50 TABLET, FILM COATED ORAL at 17:53

## 2020-08-03 RX ADMIN — HEPARIN SODIUM 2000 UNITS: 200 INJECTION, SOLUTION INTRAVENOUS at 12:48

## 2020-08-03 RX ADMIN — CEFAZOLIN 1 G: 330 INJECTION, POWDER, FOR SOLUTION INTRAMUSCULAR; INTRAVENOUS at 12:04

## 2020-08-03 RX ADMIN — PROPOFOL 100 MG: 10 INJECTION, EMULSION INTRAVENOUS at 12:00

## 2020-08-03 RX ADMIN — SODIUM CHLORIDE, POTASSIUM CHLORIDE, SODIUM LACTATE AND CALCIUM CHLORIDE: 600; 310; 30; 20 INJECTION, SOLUTION INTRAVENOUS at 11:00

## 2020-08-03 RX ADMIN — WARFARIN SODIUM 7.5 MG: 7.5 TABLET ORAL at 18:14

## 2020-08-03 RX ADMIN — LOVASTATIN 20 MG: 20 TABLET ORAL at 20:38

## 2020-08-03 ASSESSMENT — COGNITIVE AND FUNCTIONAL STATUS - GENERAL
PERSONAL GROOMING: A LITTLE
STANDING UP FROM CHAIR USING ARMS: A LITTLE
TURNING FROM BACK TO SIDE WHILE IN FLAT BAD: A LITTLE
HELP NEEDED FOR BATHING: A LITTLE
DAILY ACTIVITIY SCORE: 18
WALKING IN HOSPITAL ROOM: A LITTLE
MOVING FROM LYING ON BACK TO SITTING ON SIDE OF FLAT BED: A LITTLE
MOVING TO AND FROM BED TO CHAIR: A LITTLE
DRESSING REGULAR LOWER BODY CLOTHING: A LITTLE
CLIMB 3 TO 5 STEPS WITH RAILING: A LITTLE
DRESSING REGULAR UPPER BODY CLOTHING: A LITTLE
SUGGESTED CMS G CODE MODIFIER DAILY ACTIVITY: CK
MOBILITY SCORE: 18
EATING MEALS: A LITTLE
SUGGESTED CMS G CODE MODIFIER MOBILITY: CK
TOILETING: A LITTLE

## 2020-08-03 ASSESSMENT — PATIENT HEALTH QUESTIONNAIRE - PHQ9
SUM OF ALL RESPONSES TO PHQ9 QUESTIONS 1 AND 2: 0
1. LITTLE INTEREST OR PLEASURE IN DOING THINGS: NOT AT ALL
2. FEELING DOWN, DEPRESSED, IRRITABLE, OR HOPELESS: NOT AT ALL
SUM OF ALL RESPONSES TO PHQ9 QUESTIONS 1 AND 2: 0
2. FEELING DOWN, DEPRESSED, IRRITABLE, OR HOPELESS: NOT AT ALL
1. LITTLE INTEREST OR PLEASURE IN DOING THINGS: NOT AT ALL

## 2020-08-03 ASSESSMENT — LIFESTYLE VARIABLES
TOTAL SCORE: 0
HOW MANY TIMES IN THE PAST YEAR HAVE YOU HAD 5 OR MORE DRINKS IN A DAY: 0
AVERAGE NUMBER OF DAYS PER WEEK YOU HAVE A DRINK CONTAINING ALCOHOL: 4
CONSUMPTION TOTAL: NEGATIVE
TOTAL SCORE: 0
HAVE YOU EVER FELT YOU SHOULD CUT DOWN ON YOUR DRINKING: NO
DOES PATIENT WANT TO STOP DRINKING: NO
ALCOHOL_USE: YES
ON A TYPICAL DAY WHEN YOU DRINK ALCOHOL HOW MANY DRINKS DO YOU HAVE: 0
HAVE PEOPLE ANNOYED YOU BY CRITICIZING YOUR DRINKING: NO
EVER FELT BAD OR GUILTY ABOUT YOUR DRINKING: NO
TOTAL SCORE: 0
EVER HAD A DRINK FIRST THING IN THE MORNING TO STEADY YOUR NERVES TO GET RID OF A HANGOVER: NO
EVER_SMOKED: NEVER

## 2020-08-03 ASSESSMENT — PAIN SCALES - GENERAL: PAIN_LEVEL: 0

## 2020-08-03 NOTE — CARE PLAN
Problem: Communication  Goal: The ability to communicate needs accurately and effectively will improve  Outcome: PROGRESSING AS EXPECTED   Patient educated to utilize call light. Patient and family oriented to hospital room. Patient encouraged to ask questions about plan of care. Patient effectively uses call light and is involved in POC.     Problem: Discharge Barriers/Planning  Goal: Patient's continuum of care needs will be met  Outcome: PROGRESSING AS EXPECTED   Patient discharge needs are assessed to identify potential barriers. Patient encouraged to participate in patient goals and discharge. Proper interdisciplinary teams collaborated with. Patient actively involved in care.

## 2020-08-03 NOTE — ANESTHESIA TIME REPORT
Anesthesia Start and Stop Event Times     Date Time Event    8/3/2020 1052 Ready for Procedure     1152 Anesthesia Start     1315 Anesthesia Stop        Responsible Staff  08/03/20    Name Role Begin End    Pj Calderon M.D. Anesth 1152 1315        Preop Diagnosis (Free Text):  Pre-op Diagnosis             Preop Diagnosis (Codes):    Post op Diagnosis  Pacemaker complications      Premium Reason  Non-Premium    Comments:

## 2020-08-03 NOTE — ANESTHESIA QCDR
2019 [facility] Clinical Data Registry (for Quality Improvement)     Postoperative nausea/vomiting risk protocol (Adult = 18 yrs and Pediatric 3-17 yrs)- (430 and 463)  General inhalation anesthetic (NOT TIVA) with PONV risk factors: Yes  Provision of anti-emetic therapy with at least 2 different classes of agents: Yes   Patient DID NOT receive anti-emetic therapy and reason is documented in Medical Record:  N/A    Multimodal Pain Management- (477)  Non-emergent surgery AND patient age >= 18: No  Use of Multimodal Pain Management, two or more drugs and/or interventions, NOT including systemic opioids:   Exception: Documented allergy to multiple classes of analgesics:     Smoking Abstinence (404)  Patient is current smoker (cigarette, pipe, e-cig, marijuanna): No  Elective Surgery:   Abstinence instructions provided prior to day of surgery:   Patient abstained from smoking on day of surgery:     Pre-Op Beta-Blocker in Isolated CABG (44)  Isolated CABG AND patient age >= 18: No  Beta-blocker admin within 24 hours of surgical incision:   Exception:of medical reason(s) for not administering beta blocker within 24 hours prior to surgical incision (e.g., not  indicated,other medical reason):     PACU assessment of acute postoperative pain prior to Anesthesia Care End- Applies to Patients Age = 18- (ABG7)  Initial PACU pain score is which of the following: < 7/10  Patient unable to report pain score: N/A    Post-anesthetic transfer of care checklist/protocol to PACU/ICU- (426 and 427)  Upon conclusion of case, patient transferred to which of the following locations: PACU/Non-ICU  Use of transfer checklist/protocol: Yes  Exclusion: Service Performed in Patient Hospital Room (and thus did not require transfer): N/A  Unplanned admission to ICU related to anesthesia service up through end of PACU care- (MD51)  Unplanned admission to ICU (not initially anticipated at anesthesia start time): No

## 2020-08-03 NOTE — ANESTHESIA POSTPROCEDURE EVALUATION
Patient: Lindsay Landrum    Procedure Summary     Date:  08/03/20 Room / Location:  [facility] IMAGING - CATH LAB [facility]    Anesthesia Start:  1152 Anesthesia Stop:  1315    Procedures:       CL-PACEMAKER MICRA LEADLESS PACING SYSTEM      CL-ICD,PM,BIV LEAD EXTRACTION W/ ANESTHESIA Diagnosis:       Longstanding persistent atrial fibrillation (HCC)      Slow ventricular response      Elective replacement indicated for pacemaker      (See Associated Dx)      (See Associated Dx)    Scheduled Providers:  Sukh Zamorano M.D.; Pj Calderon M.D. Responsible Provider:  Pj Calderon M.D.    Anesthesia Type:  general ASA Status:  3          Final Anesthesia Type: general  Last vitals  BP   Blood Pressure : 122/58    Temp   36.5 °C (97.7 °F)    Pulse   Pulse: 70   Resp   18    SpO2   100 %      Anesthesia Post Evaluation    Patient location during evaluation: PACU  Patient participation: complete - patient participated  Level of consciousness: awake and alert  Pain score: 0    Airway patency: patent  Anesthetic complications: no  Cardiovascular status: hemodynamically stable  Respiratory status: acceptable  Hydration status: euvolemic    PONV: none

## 2020-08-03 NOTE — OP REPORT
Electrophysiology Procedure Note  [facility]    PROCEDURE PERFORMED: Micra pacemaker implantation, Removal of pacemaker generator, extraction of pacemaker lead (single system)    : Sukh Zamorano MD    ASSISTANT: none    ANESTHESIA: General anesthesia provided by Dr Calderon    EBL: 30 cc    SPECIMENS: None    INDICATION: Complete heart block, device site pain    PRE PROCEDURE ECG: AF     POST PROCEDURE ECG: AF      COMPLICATIONS:  None     DESCRIPTION OF PROCEDURE:  After informed written consent, the patient was brought to the electrophysiology lab in the fasting, unsedated state. The patient was prepped and draped in the usual sterile fashion. The procedure was performed under moderate sedation with local anesthetic.     Using the right femoral vein, a 8 Azerbaijani sheath was placed using modified Seldinger technique.  Next a stiff Amplatz wire was advanced to the high right atrium.    The existing pacemaker site was identified in the lower right abdomen/groin, lidocaine was applied to the scar and the device pocket was opened with incision, cautery, and blunt dissection. The device was removed from the pocket and the lead was freed from adhesions and left connected to the generator.    Next the 8 Azerbaijani sheath was removed and a small incision was made over the insertion site using a #11 blade.  The site was dilated sequentially with 12 Azerbaijani, then a 16 Azerbaijani, than 20 Azerbaijani dilator.  Next the Micra introducer system was placed over the Amplatz wire to the mid right atrium.  The patient received 3000 units of heparin.  The Micra introducer was constantly flushed.  Next we prepared the MIcra delivery sheath and flushed it until all air bubbles were eliminated.  The side arm on the Micra delivery sheath was hooked up to constant irrigation.  Next the Micra delivery system was placed in the mid right atrium and the Micra-introducer sheath was brought back into the inferior vena cava.  Next  under fluoroscopy the Micra delivery system was advanced across the tricuspid valve and the device was advanced to the high right ventricular septum using clockwise rotation. Initially, the sheath was difficult to pass through the tricuspid valve and instead would cross into the left atrium, presumably via an atrial septal defect residual from mitral clip insertion, but eventually the location was confirmed fluoroscopically to be in the right ventricle. Location was confirmed with MOTTA and Khmer views.  Next the Micra pacemaker was delivered to the high right ventricular septum in the normal fashion.  Testing showed adequate pacemaker function.  Next using a tug test on the tether unders cine fluoroscopy showed at least 2 of the tines attached to the trabeculae.  Next the tether was flossed.  Next the tether was cut and removed.  Fluoroscopy showed stable location of Micra pacemaker.  Following this, the introducer sheath and the delivery sheath were removed in its entirety.  Hemostasis was obtained with a figure of 8 suture.    Following successful Micra pacemaker implantation, the existing pacemaker lead was disconnected from the generator. A long stiff stylet was introduced into the lead for extraction. The screw was retracted and the lead was removed with manual traction. Ethibond was removed from the pocket, the pocket was irrigated and inspected, no bleeding was seen. The pocket was closed with 3 layers of absorbable sutures.     Following recovery from sedation, the patient was transferred to a monitored bed in good condition.     IMPLANTED DEVICE INFORMATION:    Right ventricular pacemaker is a Medtronic model # UT0QU81 , serial # [id number] ,R wave 7.3 millivolts, threshold 1.0 Volts, pacing impedance 550 Ohms.    DEVICE PROGRAMMING:  VVI 80bpm    FLUOROSCOPY TIME: 6.4 min    IMPRESSIONS:  1. Successful Micra pacemaker implantation  2. Successful single chamber pacemaker lead extraction with generator  removal.    RECOMMENDATIONS:  1. Transfer to monitored bed  2. PA and lateral chest x-ray  3. Device interrogation prior to hospital discharge  4. Followup in device clinic

## 2020-08-03 NOTE — TELEPHONE ENCOUNTER
----- Message from Sukh Zamorano M.D. sent at 8/2/2020 12:20 PM PDT -----  Echo shows severe mitral regurgitation despite MitralClip in place.    Please refer to Dr Capps for consideration of re-do Mitral Clip. I will discuss with patient as well in-person at time of her pacemaker procedure.  [facility]  ----- Message -----  From: Carlos A Mckinley  Sent: 8/2/2020  12:19 PM PDT  To: Sukh Zamorano M.D.

## 2020-08-03 NOTE — PROGRESS NOTES
Patient transported from PPU. Patient AOx4.. Plan of care discussed with patient. Patient oriented to floor and surroundings. Patient currently on room air. VSS. Patient is complaining of pain 7/10 in groin site. Tele box placed. Monitor room notified. 2 rn skin check performed. Patient educated to call for assistance before ambulating. Patient education regarding fall risk. Call light within reach. Fall precautions in place.

## 2020-08-03 NOTE — ANESTHESIA PREPROCEDURE EVALUATION
"    Relevant Problems   CARDIAC   (+) AF (atrial fibrillation) (HCC)      ENDO   (+) Hypothyroidism     /58   Pulse 70   Temp 36.5 °C (97.7 °F) (Temporal)   Resp 18   Ht 1.575 m (5' 2\")   Wt 49.4 kg (108 lb 14.5 oz)   SpO2 100%   BMI 19.92 kg/m²     Physical Exam    Airway   Mallampati: II  TM distance: >3 FB  Neck ROM: full       Cardiovascular - normal exam  Rhythm: regular  Rate: normal  (-) murmur     Dental - normal exam           Pulmonary - normal exam  Breath sounds clear to auscultation     Abdominal    Neurological - normal exam                 Anesthesia Plan    ASA 3       Plan - general       Airway plan will be LMA        Induction: intravenous    Postoperative Plan: Postoperative administration of opioids is intended.    Pertinent diagnostic labs and testing reviewed    Informed Consent:    Anesthetic plan and risks discussed with patient.    Use of blood products discussed with: patient whom consented to blood products.         " Additional Notes: NC cryotherapy destruction x1 done Detail Level: Simple

## 2020-08-03 NOTE — ANESTHESIA PROCEDURE NOTES
Airway    Date/Time: 8/3/2020 12:01 PM  Performed by: Pj Calderon M.D.  Authorized by: Pj Calderon M.D.     Location:  OR  Urgency:  Elective  Difficult Airway: No    Indications for Airway Management:  Anesthesia      Spontaneous Ventilation: absent    Sedation Level:  Deep  Preoxygenated: Yes    Mask Difficulty Assessment:  0 - not attempted  Final Airway Type:  Supraglottic airway  Final Supraglottic Airway:  Standard LMA    SGA Size:  3  Number of Attempts at Approach:  1

## 2020-08-03 NOTE — TELEPHONE ENCOUNTER
Sukh Zamorano M.D.  Kisha Stockton, RSISI.; Giulia Valdez, Med Ass't               I spoke with Dr Capps regarding evaluation for mitral clip. He would like a TOM scheduled before seeing this patient. I have placed the order. Let me know if any issues.     Thanks   MC

## 2020-08-04 VITALS
WEIGHT: 109.13 LBS | HEART RATE: 70 BPM | RESPIRATION RATE: 16 BRPM | SYSTOLIC BLOOD PRESSURE: 91 MMHG | DIASTOLIC BLOOD PRESSURE: 47 MMHG | TEMPERATURE: 97 F | BODY MASS INDEX: 20.08 KG/M2 | HEIGHT: 62 IN | OXYGEN SATURATION: 93 %

## 2020-08-04 DIAGNOSIS — I34.0 SEVERE MITRAL REGURGITATION: ICD-10-CM

## 2020-08-04 DIAGNOSIS — I34.0 MITRAL VALVE INSUFFICIENCY, UNSPECIFIED ETIOLOGY: ICD-10-CM

## 2020-08-04 PROBLEM — Z95.0 S/P PLACEMENT OF CARDIAC PACEMAKER: Status: ACTIVE | Noted: 2020-08-04

## 2020-08-04 LAB
ANION GAP SERPL CALC-SCNC: 15 MMOL/L (ref 7–16)
BUN SERPL-MCNC: 32 MG/DL (ref 8–22)
CALCIUM SERPL-MCNC: 8.5 MG/DL (ref 8.5–10.5)
CHLORIDE SERPL-SCNC: 99 MMOL/L (ref 96–112)
CO2 SERPL-SCNC: 22 MMOL/L (ref 20–33)
CREAT SERPL-MCNC: 1.43 MG/DL (ref 0.5–1.4)
EKG IMPRESSION: NORMAL
ERYTHROCYTE [DISTWIDTH] IN BLOOD BY AUTOMATED COUNT: 50.4 FL (ref 35.9–50)
GLUCOSE SERPL-MCNC: 110 MG/DL (ref 65–99)
HCT VFR BLD AUTO: 31.5 % (ref 37–47)
HGB BLD-MCNC: 9.7 G/DL (ref 12–16)
INR PPP: 2.35 (ref 0.87–1.13)
MCH RBC QN AUTO: 25.1 PG (ref 27–33)
MCHC RBC AUTO-ENTMCNC: 30.8 G/DL (ref 33.6–35)
MCV RBC AUTO: 81.6 FL (ref 81.4–97.8)
PLATELET # BLD AUTO: 216 K/UL (ref 164–446)
PMV BLD AUTO: 9.7 FL (ref 9–12.9)
POTASSIUM SERPL-SCNC: 4.5 MMOL/L (ref 3.6–5.5)
PROTHROMBIN TIME: 26.5 SEC (ref 12–14.6)
RBC # BLD AUTO: 3.86 M/UL (ref 4.2–5.4)
SODIUM SERPL-SCNC: 136 MMOL/L (ref 135–145)
WBC # BLD AUTO: 7.2 K/UL (ref 4.8–10.8)

## 2020-08-04 PROCEDURE — A9270 NON-COVERED ITEM OR SERVICE: HCPCS | Performed by: INTERNAL MEDICINE

## 2020-08-04 PROCEDURE — 85027 COMPLETE CBC AUTOMATED: CPT

## 2020-08-04 PROCEDURE — 700102 HCHG RX REV CODE 250 W/ 637 OVERRIDE(OP): Performed by: INTERNAL MEDICINE

## 2020-08-04 PROCEDURE — 99024 POSTOP FOLLOW-UP VISIT: CPT | Performed by: NURSE PRACTITIONER

## 2020-08-04 PROCEDURE — 93005 ELECTROCARDIOGRAM TRACING: CPT | Performed by: INTERNAL MEDICINE

## 2020-08-04 PROCEDURE — 80048 BASIC METABOLIC PNL TOTAL CA: CPT

## 2020-08-04 PROCEDURE — 93010 ELECTROCARDIOGRAM REPORT: CPT | Performed by: INTERNAL MEDICINE

## 2020-08-04 PROCEDURE — 85610 PROTHROMBIN TIME: CPT

## 2020-08-04 PROCEDURE — 36415 COLL VENOUS BLD VENIPUNCTURE: CPT

## 2020-08-04 PROCEDURE — G0378 HOSPITAL OBSERVATION PER HR: HCPCS

## 2020-08-04 RX ADMIN — FUROSEMIDE 40 MG: 40 TABLET ORAL at 05:15

## 2020-08-04 RX ADMIN — SPIRONOLACTONE 25 MG: 25 TABLET ORAL at 05:15

## 2020-08-04 RX ADMIN — LEVOTHYROXINE SODIUM 75 MCG: 0.07 TABLET ORAL at 05:15

## 2020-08-04 RX ADMIN — DILTIAZEM HYDROCHLORIDE 240 MG: 240 CAPSULE, COATED, EXTENDED RELEASE ORAL at 05:15

## 2020-08-04 ASSESSMENT — PATIENT HEALTH QUESTIONNAIRE - PHQ9
1. LITTLE INTEREST OR PLEASURE IN DOING THINGS: NOT AT ALL
2. FEELING DOWN, DEPRESSED, IRRITABLE, OR HOPELESS: NOT AT ALL
SUM OF ALL RESPONSES TO PHQ9 QUESTIONS 1 AND 2: 0

## 2020-08-04 ASSESSMENT — FIBROSIS 4 INDEX: FIB4 SCORE: 2.57

## 2020-08-04 NOTE — CARE PLAN
Problem: Fluid Volume:  Goal: Will maintain balanced intake and output  Outcome: PROGRESSING AS EXPECTED  Intervention: Monitor, educate, and encourage compliance with therapeutic intake of liquids  Note: Pt educated to drink plenty of oral fluids following procedure. Pt voiced understanding and has been demonstrating understanding as well.     Problem: Venous Thromboembolism (VTW)/Deep Vein Thrombosis (DVT) Prevention:  Goal: Patient will participate in Venous Thrombosis (VTE)/Deep Vein Thrombosis (DVT)Prevention Measures  Outcome: PROGRESSING AS EXPECTED  Flowsheets (Taken 8/4/2020 0243)  Pharmacologic Prophylaxis Used: Warfarin (Coumadin)  Note: Pt compliant with coumadin regimen.

## 2020-08-04 NOTE — DISCHARGE INSTRUCTIONS
Discharge Instructions    Discharged to home by car with relative. Discharged via wheelchair, hospital escort: Yes.  Special equipment needed: Not Applicable    Be sure to schedule a follow-up appointment with your primary care doctor or any specialists as instructed.     Discharge Plan:   Diet Plan: Discussed  Activity Level: Discussed  Confirmed Follow up Appointment: Patient to Call and Schedule Appointment  Confirmed Symptoms Management: Discussed  Medication Reconciliation Updated: Yes    I understand that a diet low in cholesterol, fat, and sodium is recommended for good health. Unless I have been given specific instructions below for another diet, I accept this instruction as my diet prescription.   Other diet: heart healthy    Special Instructions: None    · Is patient discharged on Warfarin / Coumadin?   Yes    You are receiving the drug warfarin. Please understand the importance of monitoring warfarin with scheduled PT/INR blood draws.  Follow-up with a call to your personal Doctor's office in 3 days to schedule a PT/INR. .    IMPORTANT: HOW TO USE THIS INFORMATION:  This is a summary and does NOT have all possible information about this product. This information does not assure that this product is safe, effective, or appropriate for you. This information is not individual medical advice and does not substitute for the advice of your health care professional. Always ask your health care professional for complete information about this product and your specific health needs.      WARFARIN - ORAL (WARF-uh-rin)      COMMON BRAND NAME(S): Coumadin      WARNING:  Warfarin can cause very serious (possibly fatal) bleeding. This is more likely to occur when you first start taking this medication or if you take too much warfarin. To decrease your risk for bleeding, your doctor or other health care provider will monitor you closely and check your lab results (INR test) to make sure you are not taking too much  "warfarin. Keep all medical and laboratory appointments. Tell your doctor right away if you notice any signs of serious bleeding. See also Side Effects section.      USES:  This medication is used to treat blood clots (such as in deep vein thrombosis-DVT or pulmonary embolus-PE) and/or to prevent new clots from forming in your body. Preventing harmful blood clots helps to reduce the risk of a stroke or heart attack. Conditions that increase your risk of developing blood clots include a certain type of irregular heart rhythm (atrial fibrillation), heart valve replacement, recent heart attack, and certain surgeries (such as hip/knee replacement). Warfarin is commonly called a \"blood thinner,\" but the more correct term is \"anticoagulant.\" It helps to keep blood flowing smoothly in your body by decreasing the amount of certain substances (clotting proteins) in your blood.      HOW TO USE:  Read the Medication Guide provided by your pharmacist before you start taking warfarin and each time you get a refill. If you have any questions, ask your doctor or pharmacist. Take this medication by mouth with or without food as directed by your doctor or other health care professional, usually once a day. It is very important to take it exactly as directed. Do not increase the dose, take it more frequently, or stop using it unless directed by your doctor. Dosage is based on your medical condition, laboratory tests (such as INR), and response to treatment. Your doctor or other health care provider will monitor you closely while you are taking this medication to determine the right dose for you. Use this medication regularly to get the most benefit from it. To help you remember, take it at the same time each day. It is important to eat a balanced, consistent diet while taking warfarin. Some foods can affect how warfarin works in your body and may affect your treatment and dose. Avoid sudden large increases or decreases in your intake " of foods high in vitamin K (such as broccoli, cauliflower, cabbage, brussels sprouts, kale, spinach, and other green leafy vegetables, liver, green tea, certain vitamin supplements). If you are trying to lose weight, check with your doctor before you try to go on a diet. Cranberry products may also affect how your warfarin works. Limit the amount of cranberry juice (16 ounces/480 milliliters a day) or other cranberry products you may drink or eat.      SIDE EFFECTS:  Nausea, loss of appetite, or stomach/abdominal pain may occur. If any of these effects persist or worsen, tell your doctor or pharmacist promptly. Remember that your doctor has prescribed this medication because he or she has judged that the benefit to you is greater than the risk of side effects. Many people using this medication do not have serious side effects. This medication can cause serious bleeding if it affects your blood clotting proteins too much (shown by unusually high INR lab results). Even if your doctor stops your medication, this risk of bleeding can continue for up to a week. Tell your doctor right away if you have any signs of serious bleeding, including: unusual pain/swelling/discomfort, unusual/easy bruising, prolonged bleeding from cuts or gums, persistent/frequent nosebleeds, unusually heavy/prolonged menstrual flow, pink/dark urine, coughing up blood, vomit that is bloody or looks like coffee grounds, severe headache, dizziness/fainting, unusual or persistent tiredness/weakness, bloody/black/tarry stools, chest pain, shortness of breath, difficulty swallowing. Tell your doctor right away if any of these unlikely but serious side effects occur: persistent nausea/vomiting, severe stomach/abdominal pain, yellowing eyes/skin. This drug rarely has caused very serious (possibly fatal) problems if its effects lead to small blood clots (usually at the beginning of treatment). This can lead to severe skin/tissue damage that may require  surgery or amputation if left untreated. Patients with certain blood conditions (protein C or S deficiency) may be at greater risk. Get medical help right away if any of these rare but serious side effects occur: painful/red/purplish patches on the skin (such as on the toe, breast, abdomen), change in the amount of urine, vision changes, confusion, slurred speech, weakness on one side of the body. A very serious allergic reaction to this drug is rare. However, get medical help right away if you notice any symptoms of a serious allergic reaction, including: rash, itching/swelling (especially of the face/tongue/throat), severe dizziness, trouble breathing. This is not a complete list of possible side effects. If you notice other effects not listed above, contact your doctor or pharmacist. In the [place] - Call your doctor for medical advice about side effects. You may report side effects to FDA at 5-602-WFH-2313. In [place] - Call your doctor for medical advice about side effects. You may report side effects to [facility] [place] at [phone].      PRECAUTIONS:  Before taking warfarin, tell your doctor or pharmacist if you are allergic to it; or if you have any other allergies. This product may contain inactive ingredients, which can cause allergic reactions or other problems. Talk to your pharmacist for more details. Before using this medication, tell your doctor or pharmacist your medical history, especially of: blood disorders (such as anemia, hemophilia), bleeding problems (such as bleeding of the stomach/intestines, bleeding in the brain), blood vessel disorders (such as aneurysms), recent major injury/surgery, liver disease, alcohol use, mental/mood disorders (including memory problems), frequent falls/injuries. It is important that all your doctors and dentists know that you take warfarin. Before having surgery or any medical/dental procedures, tell your doctor or dentist that you are taking this medication and about  all the products you use (including prescription drugs, nonprescription drugs, and herbal products). Avoid getting injections into the muscles. If you must have an injection into a muscle (for example, a flu shot), it should be given in the arm. This way, it will be easier to check for bleeding and/or apply pressure bandages. This medication may cause stomach bleeding. Daily use of alcohol while using this medicine will increase your risk for stomach bleeding and may also affect how this medication works. Limit or avoid alcoholic beverages. If you have not been eating well, if you have an illness or infection that causes fever, vomiting, or diarrhea for more than 2 days, or if you start using any antibiotic medications, contact your doctor or pharmacist immediately because these conditions can affect how warfarin works. This medication can cause heavy bleeding. To lower the chance of getting cut, bruised, or injured, use great caution with sharp objects like safety razors and nail cutters. Use an electric razor when shaving and a soft toothbrush when brushing your teeth. Avoid activities such as contact sports. If you fall or injure yourself, especially if you hit your head, call your doctor immediately. Your doctor may need to check you. The Food & Drug Administration has stated that generic warfarin products are interchangeable. However, consult your doctor or pharmacist before switching warfarin products. Be careful not to take more than one medication that contains warfarin unless specifically directed by the doctor or health care provider who is monitoring your warfarin treatment. Older adults may be at greater risk for bleeding while using this drug. This medication is not recommended for use during pregnancy because of serious (possibly fatal) harm to an unborn baby. Discuss the use of reliable forms of birth control with your doctor. If you become pregnant or think you may be pregnant, tell your doctor  "immediately. If you are planning pregnancy, discuss a plan for managing your condition with your doctor before you become pregnant. Your doctor may switch the type of medication you use during pregnancy. Very small amounts of this medication may pass into breast milk but is unlikely to harm a nursing infant. Consult your doctor before breast-feeding.      DRUG INTERACTIONS:  Drug interactions may change how your medications work or increase your risk for serious side effects. This document does not contain all possible drug interactions. Keep a list of all the products you use (including prescription/nonprescription drugs and herbal products) and share it with your doctor and pharmacist. Do not start, stop, or change the dosage of any medicines without your doctor's approval. Warfarin interacts with many prescription, nonprescription, vitamin, and herbal products. This includes medications that are applied to the skin or inside the vagina or rectum. The interactions with warfarin usually result in an increase or decrease in the \"blood-thinning\" (anticoagulant) effect. Your doctor or other health care professional should closely monitor you to prevent serious bleeding or clotting problems. While taking warfarin, it is very important to tell your doctor or pharmacist of any changes in medications, vitamins, or herbal products that you are taking. Some products that may interact with this drug include: capecitabine, imatinib, mifepristone. Aspirin, aspirin-like drugs (salicylates), and nonsteroidal anti-inflammatory drugs (NSAIDs such as ibuprofen, naproxen, celecoxib) may have effects similar to warfarin. These drugs may increase the risk of bleeding problems if taken during treatment with warfarin. Carefully check all prescription/nonprescription product labels (including drugs applied to the skin such as pain-relieving creams) since the products may contain NSAIDs or salicylates. Talk to your doctor about using a " different medication (such as acetaminophen) to treat pain/fever. Low-dose aspirin and related drugs (such as clopidogrel, ticlopidine) should be continued if prescribed by your doctor for specific medical reasons such as heart attack or stroke prevention. Consult your doctor or pharmacist for more details. Many herbal products interact with warfarin. Tell your doctor before taking any herbal products, especially bromelains, coenzyme Q10, cranberry, danshen, dong quai, fenugreek, garlic, ginkgo biloba, ginseng, and Sula's wort, among others. This medication may interfere with a certain laboratory test to measure theophylline levels, possibly causing false test results. Make sure laboratory personnel and all your doctors know you use this drug.      OVERDOSE:  If overdose is suspected, contact a poison control center or emergency room immediately. [place] residents can call the [place] National Poison Hotline at 1-891.905.1557. [place] residents can call a provincial poison control center. Symptoms of overdose may include: bloody/black/tarry stools, pink/dark urine, unusual/prolonged bleeding.      NOTES:  Do not share this medication with others. Laboratory and/or medical tests (such as INR, complete blood count) must be performed periodically to monitor your progress or check for side effects. Consult your doctor for more details.      MISSED DOSE:  For the best possible benefit, do not miss any doses. If you do miss a dose and remember on the same day, take it as soon as you remember. If you remember on the next day, skip the missed dose and resume your usual dosing schedule. Do not double the dose to catch up because this could increase your risk for bleeding. Keep a record of missed doses to give to your doctor or pharmacist. Contact your doctor or pharmacist if you miss 2 or more doses in a row.      STORAGE:  Store at room temperature away from light and moisture. Do not store in the bathroom. Keep all medications  away from children and pets. Do not flush medications down the toilet or pour them into a drain unless instructed to do so. Properly discard this product when it is  or no longer needed. Consult your pharmacist or local waste disposal company for more details about how to safely discard your product.      MEDICAL ALERT:  Your condition and medication can cause complications in a medical emergency. For information about enrolling in MedicAlert, call 1-225.158.2172 ([place]) or 1-127.863.7630 ([place]).      Information last revised 2010 Copyright(c) 2010 [employer]             Depression / Suicide Risk    As you are discharged from this [facility] facility, it is important to learn how to keep safe from harming yourself.    Recognize the warning signs:  · Abrupt changes in personality, positive or negative- including increase in energy   · Giving away possessions  · Change in eating patterns- significant weight changes-  positive or negative  · Change in sleeping patterns- unable to sleep or sleeping all the time   · Unwillingness or inability to communicate  · Depression  · Unusual sadness, discouragement and loneliness  · Talk of wanting to die  · Neglect of personal appearance   · Rebelliousness- reckless behavior  · Withdrawal from people/activities they love  · Confusion- inability to concentrate     If you or a loved one observes any of these behaviors or has concerns about self-harm, here's what you can do:  · Talk about it- your feelings and reasons for harming yourself  · Remove any means that you might use to hurt yourself (examples: pills, rope, extension cords, firearm)  · Get professional help from the community (Mental Health, Substance Abuse, psychological counseling)  · Do not be alone:Call your Safe Contact- someone whom you trust who will be there for you.  · Call your local CRISIS HOTLINE 369-9052 or 267-883-5361  · Call your local Children's Mobile Crisis Response Team  [facility] (073) 238-1174 or www.[employer]  · Call the toll free National Suicide Prevention Hotlines   · National Suicide Prevention Lifeline 942-084-AVJZ (2559)  · National Hope Line Network 800-SUICIDE (245-7857)    ACTIVITY/SPECIFIC OUTPATIENT DISCHARGE INSTRUCTIONS  Pacemaker Discharge Instructions/[facility]  1.  No showers for one week; may take sponge bath.  Keep dressing dry & in place until seen at for you follow up visit at the cardiology office.   Report s/s of infection such as warmth/redness/drainage/swelling at site or fever/chills.  2.  No lifting over 10 lbs for six weeks.  3.  Do not raise affected arm above shoulder level or behind head for six weeks.  4.  Avoid excessive pushing, pulling, or twisting for six weeks.  5.  No driving for the first week.  6.  Call our office ([phone]) if you notice any increased swelling, redness, warmth, or drainage at the implant site.  7.  Call our office if you develop fever > 101F or uncontrolled pain.  8.  No MRI's for 6 weeks if you have a MRI compatible device.  9.  No dental work or cleanings for 3 months.  10.  May remove arm sling after one day, but please wear if you have trouble remembering to keep your arm down.  11. Do not place cell phones or mobile devices directly over implanted device.   12. Your follow-up appointments will be done at approximately 1 week, 6 weeks, then every 3-4 months.    FOLLOW UP  Patient will follow up with EP in outpatient in 1 week for device check as arranged or sooner if needed.  Patient instructed to completed INR lab draw on Friday 08/07/20 and have [place] [facility] clinic to dose and monitor INR. Patient encouraged to follow up with her PCP regarding her anemia. Patient to monitor blood pressure and call the office with any questions/concerns.

## 2020-08-04 NOTE — DISCHARGE SUMMARY
HOSPITAL DISCHARGE INSTRUCTIONS    CHIEF COMPLAINT ON ADMISSION  Transvenous pacemaker extraction from right abdomen and leadless Micra pacemaker implantation for Complete heart block, device site pain    CODE STATUS  Full Code    HPI & HOSPITAL COURSE  This is a 87 y.o. year old female here for transvenous pacemaker extraction from right abdomen and leadless Micra pacemaker implantation for Complete heart block and continued device site pain due to location.  She is followed by Dr. Zamorano and was last seen on 07/23/20 for ongoing pacemaker site discomfort. History of left-sided pacemaker implanted in [place] by Dr. Arvizu, which was extracted quickly following apparent poor wound healing, this could not be implanted on the right side, presumably due to occlusion but unclear, and instead right groin lower abdominal pacemaker was placed.  It was uncomfortable for her and therefore she decided to proceed with leadless pacemaker placed and removal of old pacemaker.      She underwent successful single chamber pacemaker lead extraction with generator removal, and Medtronic Micra pacemaker implantation by Dr. Zamorano on 08/03/2020.  Post procedure she was continued on her prior home medications including OAC with Couamdin. INR today 2.35, Per phamacist recommendations with resume coumadin home dosing with close INR follow up with anticoagulation clinic. She declines [facility] and hospitals will have her clinic in [place] monitor.  INR ordered placed and to be checked Friday.  [facility] clinic to dose and monitor INR.     Procedural conclusions per Dr. Zamorano's Op Note (08/03/2020):  Electrophysiology Procedure Note [facility]   PROCEDURE PERFORMED: Micra pacemaker implantation, Removal of pacemaker generator, extraction of pacemaker lead (single system)  : Sukh Zamorano MD  INDICATION: Complete heart block, device site pain  PRE PROCEDURE ECG: AF   POST PROCEDURE ECG:  AF   COMPLICATIONS:  None   IMPLANTED DEVICE INFORMATION:  Right ventricular pacemaker is a Medtronic model # BF2FN00 , serial # [id number] ,R wave 7.3 millivolts, threshold 1.0 Volts, pacing impedance 550 Ohms.  DEVICE PROGRAMMING:  VVI 80bpm  FLUOROSCOPY TIME: 6.4 min  IMPRESSIONS:  1. Successful Micra pacemaker implantation  2. Successful single chamber pacemaker lead extraction with generator removal.    The patient has been seen and examined in EP rounds this AM.  Her monitored rhythm is paced presently.  Telemetry history, EKGs, labs, vital signs, and imaging have been reviewed and are stable, no arrhthymias or abnormalities.  EKG today shows Ventricular paced rhythm with underlying atrial fibrillation. Weight stable 109 lbs. H/H and Cr 1.43 stable. No Chest xray needed per Dr. Zamorano. Pacemaker interrogation today showed normal sensing and functioning. The patient denies any chest pain, dyspnea, dizziness, paraesthesias, or other complaints. Some trouble with urination post procedure, now resolved.  Her physical exam is without acute findings and CMS fully intact; specifically  her right femoral access site figure 8 suture was removed and steri-strips placed. Her right lower abdominal device extraction site dressing was changed and new dressing placed. Mild edema, soft, non-tender. The right femoral access site and right lower abdomen extraction site is  clean, dry, intact with tegaderm/gauze. No evidence of active bleeding, erythema, or hematoma.  She has been out of bed and ambulated without difficulty.     Therefore, she is discharged in good and stable condition with close outpatient follow-up.    DISCHARGE PROBLEM LIST  1. Complete Heart Block/Device site pain  2. S/P successful single chamber pacemaker lead extraction with generator removal, and Medtronic Micra pacemaker implantation by Dr. Zamorano on 08/03/2020.   3 . Persistent Atrial Fibrillation   4. Chronic Anticoagulation with Coumadin    MEDICATIONS  ON DISCHARGE   Lindsay Landrum   Home Medication Instructions DES:[id number]    Printed on:08/04/20 1009   Medication Information                      diltiazem (TIAZAC) 240 MG SR capsule               doxycycline (VIBRAMYCIN) 50 MG capsule               folic acid (FOLVITE) 1 MG Tab               furosemide (LASIX) 40 MG Tab               JANTOVEN 4 MG Tab               levothyroxine (SYNTHROID) 50 MCG Tab  Take 1 Tab by mouth every day.             levothyroxine (SYNTHROID) 75 MCG Tab               lovastatin (MEVACOR) 20 MG Tab  Take 1 Tab by mouth every bedtime.             Potassium 75 MG Tab  Take  by mouth.             spironolactone (ALDACTONE) 25 MG Tab               warfarin (COUMADIN) 5 MG TABS  Take 1 -1 1/2 tablets daily or as directed by Coumadin Clinic.               DIET: CARDIAC DIET    LABORATORY  Lab Results   Component Value Date/Time    SODIUM 138 08/03/2020 10:58 AM    POTASSIUM 4.9 08/03/2020 10:58 AM    CHLORIDE 97 08/03/2020 10:58 AM    CO2 24 08/03/2020 10:58 AM    GLUCOSE 106 (H) 08/03/2020 10:58 AM    BUN 36 (H) 08/03/2020 10:58 AM    CREATININE 1.51 (H) 08/03/2020 10:58 AM      Lab Results   Component Value Date/Time    WBC 5.6 08/03/2020 10:58 AM    HEMOGLOBIN 10.8 (L) 08/03/2020 10:58 AM    HEMATOCRIT 34.9 (L) 08/03/2020 10:58 AM    PLATELETCT 235 08/03/2020 10:58 AM      ACTIVITY/SPECIFIC OUTPATIENT DISCHARGE INSTRUCTIONS  Pacemaker Discharge Instructions/[facility]  1.  No showers for one week; may take sponge bath.  Keep dressing dry & in place until seen at for you follow up visit at the cardiology office.   Report s/s of infection such as warmth/redness/drainage/swelling at site or fever/chills.  2.  No lifting over 10 lbs for six weeks.  3.  Do not raise affected arm above shoulder level or behind head for six weeks.  4.  Avoid excessive pushing, pulling, or twisting for six weeks.  5.  No driving for the first week.  6.  Call our office ([phone]) if you notice any  increased swelling, redness, warmth, or drainage at the implant site.  7.  Call our office if you develop fever > 101F or uncontrolled pain.  8.  No MRI's for 6 weeks if you have a MRI compatible device.  9.  No dental work or cleanings for 3 months.  10.  May remove arm sling after one day, but please wear if you have trouble remembering to keep your arm down.  11. Do not place cell phones or mobile devices directly over implanted device.   12. Your follow-up appointments will be done at approximately 1 week, 6 weeks, then every 3-4 months.    FOLLOW UP  Patient will follow up with EP in outpatient in 1 week for device check as arranged or sooner if needed.  Patient instructed to completed INR lab draw on Friday 08/07/20 and have [place] anticoagulation clinic to dose and monitor INR. Patient to monitor blood pressure and call the office with any questions/concerns. The patient was encouraged to follow up with her PCP regarding her anemia.  The above plan and medications were reviewed in detail with the patient at time of discharge.  All patients questions/concerns were answered and patient verbalized understanding and is in agreement.    Future Appointments   Date Time Provider Department Center   8/5/2020  9:40 AM Santo Gibson M.D. [facility] None   8/12/2020 10:00 AM PACER CHECK-CAM B [facility] None   9/17/2020  1:15 PM PACER CHECK-[facility] B [facility] None   9/17/2020  2:00 PM NEIL Torrez. [facility] None     Thank you for allowing me to participate in this patients care.  Please contact me with any questions or concerns.     NEIL Torrez.   [facility]  [phone]

## 2020-08-04 NOTE — PROGRESS NOTES
13:00  Patient discharged home with family.  Patient was given and read all discharge instructions and verbalized understanding.  The cholesterol medication was not sent to pharmacy but patient states will be calling her doctor to get it changed to something different.  Patient denied pain or discomfort.  She was wheeled down to the front of the hospital by staff and had her belongings with her.

## 2020-08-11 NOTE — PROGRESS NOTES
REFERRING PHYSICIAN: Sukh Zamorano MD.    CONSULTING PHYSICIAN: Raz Vee MD, FACS.    CHIEF COMPLAINT: Shortness of breath.    HISTORY OF PRESENT ILLNESS: The patient is an 87 y.o. female with history of chronic atrial fibrillation with slow ventricular response, hyperlipidemia, hypothyroidism, permanent pacemaker placement and severe mitral regurgitation status post mitral clip in 2018 at [facility].  She unfortunately had an early infection in her original pacemaker which led to it being replaced and put into her right lower abdomen.  She states that she has had increasing shortness of breath over the last 6 months.  She also suffers insomnia and feels tired all of the time.  She has dizziness with position changes. She denies  chest pain, lower extremity edema,  syncope, orthopnea, or PND.  She is not very active due to her fatigue but could walk a block before having to stop and rest.       PAST MEDICAL HISTORY:   Active Ambulatory Problems     Diagnosis Date Noted   • AF (atrial fibrillation) (formerly [facility]) 05/21/2012   • Chronic anticoagulation 05/21/2012   • Hyperlipidemia 05/21/2012   • Hypothyroidism 05/21/2012   • Vitamin d deficiency 05/21/2012   • Constipation 10/17/2012   • Abnormal alkaline phosphatase test 05/21/2014   • S/P placement of cardiac pacemaker-MDT Micra leadless PPM 08/04/2020     Resolved Ambulatory Problems     Diagnosis Date Noted   • No Resolved Ambulatory Problems     Past Medical History:   Diagnosis Date   • Breath shortness    • Cataract    • Heart valve disease    • Pacemaker    • Urinary incontinence        PAST SURGICAL HISTORY:   Past Surgical History:   Procedure Laterality Date   • CATARACT EXTRACTION WITH IOL     • GYN SURGERY      hysterectomy   • OTHER CARDIAC SURGERY      pacemaker        ALLERGIES: No Known Allergies     CURRENT MEDICATIONS:   Current Outpatient Medications:   •  diltiazem (TIAZAC) 240 MG SR capsule, , Disp: , Rfl:   •  folic acid  (FOLVITE) 1 MG Tab, , Disp: , Rfl:   •  levothyroxine (SYNTHROID) 75 MCG Tab, , Disp: , Rfl:   •  spironolactone (ALDACTONE) 25 MG Tab, , Disp: , Rfl:   •  doxycycline (VIBRAMYCIN) 50 MG capsule, , Disp: , Rfl:   •  Potassium 75 MG Tab, Take  by mouth., Disp: , Rfl:   •  furosemide (LASIX) 40 MG Tab, , Disp: , Rfl:   •  JANTOVEN 4 MG Tab, , Disp: , Rfl:   •  levothyroxine (SYNTHROID) 50 MCG Tab, Take 1 Tab by mouth every day., Disp: 30 Tab, Rfl: 0  •  lovastatin (MEVACOR) 20 MG Tab, Take 1 Tab by mouth every bedtime., Disp: 30 Tab, Rfl: 0  •  warfarin (COUMADIN) 5 MG TABS, Take 1 -1 1/2 tablets daily or as directed by Coumadin Clinic., Disp: 90 Tab, Rfl: 4    FAMILY HISTORY: No family history on file.     SOCIAL HISTORY:   Social History     Socioeconomic History   • Marital status: Single     Spouse name: Not on file   • Number of children: Not on file   • Years of education: Not on file   • Highest education level: Not on file   Occupational History   • Not on file   Social Needs   • Financial resource strain: Not on file   • Food insecurity     Worry: Not on file     Inability: Not on file   • Transportation needs     Medical: Not on file     Non-medical: Not on file   Tobacco Use   • Smoking status: Never Smoker   • Smokeless tobacco: Never Used   Substance and Sexual Activity   • Alcohol use: Yes     Frequency: 2-3 times a week   • Drug use: No   • Sexual activity: Not on file   Lifestyle   • Physical activity     Days per week: Not on file     Minutes per session: Not on file   • Stress: Not on file   Relationships   • Social connections     Talks on phone: Not on file     Gets together: Not on file     Attends [employer] service: Not on file     Active member of club or organization: Not on file     Attends meetings of clubs or organizations: Not on file     Relationship status: Not on file   • Intimate partner violence     Fear of current or ex partner: Not on file     Emotionally abused: Not on file      "Physically abused: Not on file     Forced sexual activity: Not on file   Other Topics Concern   • Not on file   Social History Narrative   • Not on file       REVIEW OF SYSTEMS:  Review of Systems   Constitutional: Positive for malaise/fatigue.   HENT: Positive for hearing loss.    Eyes: Positive for blurred vision.   Respiratory: Positive for shortness of breath.    Cardiovascular: Negative.    Gastrointestinal: Negative.    Genitourinary: Positive for dysuria.   Musculoskeletal: Negative.    Skin: Negative.    Neurological: Positive for dizziness.   Endo/Heme/Allergies: Negative.    Psychiatric/Behavioral: Negative.      BP (!) 98/50 (BP Location: Left arm, Patient Position: Sitting, BP Cuff Size: Adult)   Pulse 63   Temp 36.4 °C (97.5 °F) (Temporal)   Ht 1.575 m (5' 2\")   Wt 48.5 kg (107 lb)   SpO2 98%   BMI 19.57 kg/m² .    Physical Exam   Constitutional: She is oriented to person, place, and time and well-developed, well-nourished, and in no distress. No distress.   frail   HENT:   Head: Normocephalic.   Right Ear: Decreased hearing is noted.   Left Ear: Decreased hearing is noted.   Eyes: Pupils are equal, round, and reactive to light.   Neck: Neck supple.   Cardiovascular: Normal rate and regular rhythm. Exam reveals no gallop.   Murmur heard.   Systolic murmur is present with a grade of 3/6.  Pulmonary/Chest: Effort normal and breath sounds normal. No respiratory distress. She has no wheezes. She has no rales.   Abdominal: Soft. Bowel sounds are normal. She exhibits no distension. There is no abdominal tenderness.   Musculoskeletal: Normal range of motion.         General: No edema.   Neurological: She is alert and oriented to person, place, and time.   Skin: Skin is warm and dry.   Psychiatric: Mood, memory, affect and judgment normal.        LABS REVIEWED:  Lab Results   Component Value Date/Time    SODIUM 136 08/04/2020 08:03 AM    POTASSIUM 4.5 08/04/2020 08:03 AM    CHLORIDE 99 08/04/2020 08:03 AM "    CO2 22 08/04/2020 08:03 AM    GLUCOSE 110 (H) 08/04/2020 08:03 AM    BUN 32 (H) 08/04/2020 08:03 AM    CREATININE 1.43 (H) 08/04/2020 08:03 AM    BUNCREATRAT 18 07/27/2020 10:33 AM      Lab Results   Component Value Date/Time    PROTHROMBTM 26.5 (H) 08/04/2020 08:03 AM    INR 2.35 (H) 08/04/2020 08:03 AM      Lab Results   Component Value Date/Time    WBC 7.2 08/04/2020 08:03 AM    RBC 3.86 (L) 08/04/2020 08:03 AM    HEMOGLOBIN 9.7 (L) 08/04/2020 08:03 AM    HEMATOCRIT 31.5 (L) 08/04/2020 08:03 AM    MCV 81.6 08/04/2020 08:03 AM    MCH 25.1 (L) 08/04/2020 08:03 AM    MCHC 30.8 (L) 08/04/2020 08:03 AM    MPV 9.7 08/04/2020 08:03 AM        IMAGING REVIEWED AND INTERPRETED:    ECHOCARDIOGRAM [facility] 7/31/2020:  Left ventricular ejection fraction is visually estimated to be 50%.  Known transcatheter mitral valve clip with appropriate transvalvular gradient, severe mitral regurgitation.  Moderate aortic insufficiency.  Moderate tricuspid regurgitation.  Consideration of TOM for closer evaluation of mitral regurgtation severity.    ANGIOGRAM: None      IMPRESSION:  Recurrent mitral regurgitation, s/p transcatheter mitral valve repair (MitraClip x1).      PLAN:  I do not recommend surgical mitral valve replacement due to excessive operative risk.  Additionally, I do not think the patient is a candidate for another MitraClip.  The severity of the recurrent mitral regurgitation he is unclear to me from the transthoracic echocardiogram.  There is no transesophageal echocardiogram available.  I will have the patient see Dr. Capps for another opinion.    Findings and recommendations have been discussed with the patient’s cardiologist, Sukh Zamorano MD and Jean Capps MD.  Thank you for this very challenging consultation and participation in the patient’s care.  I will keep you apprised of all future developments.        Sincerely,      Raz Vee MD, FACS.

## 2020-08-12 ENCOUNTER — NON-PROVIDER VISIT (OUTPATIENT)
Dept: CARDIOLOGY | Facility: MEDICAL CENTER | Age: 85
End: 2020-08-12
Payer: MEDICARE

## 2020-08-12 ENCOUNTER — TELEPHONE (OUTPATIENT)
Dept: CARDIOLOGY | Facility: MEDICAL CENTER | Age: 85
End: 2020-08-12

## 2020-08-12 DIAGNOSIS — Z95.0 S/P PLACEMENT OF CARDIAC PACEMAKER: ICD-10-CM

## 2020-08-12 PROCEDURE — 93279 PRGRMG DEV EVAL PM/LDLS PM: CPT | Performed by: INTERNAL MEDICINE

## 2020-08-12 SDOH — HEALTH STABILITY: MENTAL HEALTH: HOW MANY STANDARD DRINKS CONTAINING ALCOHOL DO YOU HAVE ON A TYPICAL DAY?: 3 OR 4

## 2020-08-12 NOTE — TELEPHONE ENCOUNTER
Pt cam into office, discussed upcoming appt for her mitral clip and need for TOM. Giulia to schedule TOM

## 2020-08-13 ENCOUNTER — OFFICE VISIT (OUTPATIENT)
Dept: ADMISSIONS | Facility: MEDICAL CENTER | Age: 85
End: 2020-08-13
Attending: PHYSICIAN ASSISTANT
Payer: MEDICARE

## 2020-08-13 DIAGNOSIS — Z01.812 PRE-OPERATIVE LABORATORY EXAMINATION: ICD-10-CM

## 2020-08-13 LAB — COVID ORDER STATUS COVID19: NORMAL

## 2020-08-13 PROCEDURE — U0003 INFECTIOUS AGENT DETECTION BY NUCLEIC ACID (DNA OR RNA); SEVERE ACUTE RESPIRATORY SYNDROME CORONAVIRUS 2 (SARS-COV-2) (CORONAVIRUS DISEASE [COVID-19]), AMPLIFIED PROBE TECHNIQUE, MAKING USE OF HIGH THROUGHPUT TECHNOLOGIES AS DESCRIBED BY CMS-2020-01-R: HCPCS

## 2020-08-13 NOTE — TELEPHONE ENCOUNTER
Per Cath lab staff - this TOM needs to be rescheduled to 11:30. LM on patient's voicemail informing her of the arrival time change to 9:30. Requested a call back to confirm.    FYI to Dr. Mckenna of time change.

## 2020-08-14 LAB
SARS-COV-2 RNA RESP QL NAA+PROBE: NOTDETECTED
SPECIMEN SOURCE: NORMAL

## 2020-08-14 NOTE — OR NURSING
COVID-19 Pre-Surgery Screening:    Message left to return phone call to [phone] to complete COVID-19 screening.

## 2020-08-17 ENCOUNTER — HOSPITAL ENCOUNTER (OUTPATIENT)
Facility: MEDICAL CENTER | Age: 85
End: 2020-08-17
Attending: INTERNAL MEDICINE | Admitting: INTERNAL MEDICINE
Payer: MEDICARE

## 2020-08-17 ENCOUNTER — ANESTHESIA EVENT (OUTPATIENT)
Dept: CARDIOLOGY | Facility: MEDICAL CENTER | Age: 85
End: 2020-08-17
Payer: MEDICARE

## 2020-08-17 ENCOUNTER — APPOINTMENT (OUTPATIENT)
Dept: CARDIOLOGY | Facility: MEDICAL CENTER | Age: 85
End: 2020-08-17
Attending: INTERNAL MEDICINE
Payer: MEDICARE

## 2020-08-17 ENCOUNTER — ANESTHESIA (OUTPATIENT)
Dept: CARDIOLOGY | Facility: MEDICAL CENTER | Age: 85
End: 2020-08-17
Payer: MEDICARE

## 2020-08-17 VITALS
BODY MASS INDEX: 19.84 KG/M2 | HEART RATE: 69 BPM | HEIGHT: 62 IN | DIASTOLIC BLOOD PRESSURE: 60 MMHG | WEIGHT: 107.81 LBS | TEMPERATURE: 97.2 F | SYSTOLIC BLOOD PRESSURE: 109 MMHG | OXYGEN SATURATION: 97 % | RESPIRATION RATE: 16 BRPM

## 2020-08-17 DIAGNOSIS — I34.0 SEVERE MITRAL REGURGITATION: ICD-10-CM

## 2020-08-17 PROCEDURE — 160002 HCHG RECOVERY MINUTES (STAT)

## 2020-08-17 PROCEDURE — 700101 HCHG RX REV CODE 250: Performed by: ANESTHESIOLOGY

## 2020-08-17 PROCEDURE — A9270 NON-COVERED ITEM OR SERVICE: HCPCS

## 2020-08-17 PROCEDURE — 93312 ECHO TRANSESOPHAGEAL: CPT

## 2020-08-17 PROCEDURE — 700105 HCHG RX REV CODE 258: Performed by: INTERNAL MEDICINE

## 2020-08-17 PROCEDURE — 700111 HCHG RX REV CODE 636 W/ 250 OVERRIDE (IP): Performed by: ANESTHESIOLOGY

## 2020-08-17 PROCEDURE — 93312 ECHO TRANSESOPHAGEAL: CPT | Mod: 26,53 | Performed by: INTERNAL MEDICINE

## 2020-08-17 PROCEDURE — 700102 HCHG RX REV CODE 250 W/ 637 OVERRIDE(OP)

## 2020-08-17 RX ORDER — ONDANSETRON 2 MG/ML
4 INJECTION INTRAMUSCULAR; INTRAVENOUS
Status: DISCONTINUED | OUTPATIENT
Start: 2020-08-17 | End: 2020-08-17 | Stop reason: HOSPADM

## 2020-08-17 RX ORDER — HALOPERIDOL 5 MG/ML
1 INJECTION INTRAMUSCULAR
Status: DISCONTINUED | OUTPATIENT
Start: 2020-08-17 | End: 2020-08-17 | Stop reason: HOSPADM

## 2020-08-17 RX ORDER — SODIUM CHLORIDE, SODIUM LACTATE, POTASSIUM CHLORIDE, CALCIUM CHLORIDE 600; 310; 30; 20 MG/100ML; MG/100ML; MG/100ML; MG/100ML
INJECTION, SOLUTION INTRAVENOUS CONTINUOUS
Status: DISCONTINUED | OUTPATIENT
Start: 2020-08-17 | End: 2020-08-17 | Stop reason: HOSPADM

## 2020-08-17 RX ORDER — LIDOCAINE HYDROCHLORIDE 40 MG/ML
SOLUTION TOPICAL PRN
Status: DISCONTINUED | OUTPATIENT
Start: 2020-08-17 | End: 2020-08-17 | Stop reason: SURG

## 2020-08-17 RX ORDER — DIPHENHYDRAMINE HYDROCHLORIDE 50 MG/ML
12.5 INJECTION INTRAMUSCULAR; INTRAVENOUS
Status: DISCONTINUED | OUTPATIENT
Start: 2020-08-17 | End: 2020-08-17 | Stop reason: HOSPADM

## 2020-08-17 RX ADMIN — LIDOCAINE HYDROCHLORIDE 160 MG: 40 SOLUTION TOPICAL at 11:32

## 2020-08-17 RX ADMIN — POVIDONE-IODINE 15 ML: 10 SOLUTION TOPICAL at 10:02

## 2020-08-17 RX ADMIN — PROPOFOL 20 MG: 10 INJECTION, EMULSION INTRAVENOUS at 11:42

## 2020-08-17 RX ADMIN — SODIUM CHLORIDE, POTASSIUM CHLORIDE, SODIUM LACTATE AND CALCIUM CHLORIDE: 600; 310; 30; 20 INJECTION, SOLUTION INTRAVENOUS at 10:02

## 2020-08-17 RX ADMIN — PROPOFOL 30 MG: 10 INJECTION, EMULSION INTRAVENOUS at 11:40

## 2020-08-17 RX ADMIN — PROPOFOL 20 MG: 10 INJECTION, EMULSION INTRAVENOUS at 11:37

## 2020-08-17 RX ADMIN — PROPOFOL 20 MG: 10 INJECTION, EMULSION INTRAVENOUS at 11:34

## 2020-08-17 ASSESSMENT — PAIN SCALES - GENERAL: PAIN_LEVEL: 0

## 2020-08-17 ASSESSMENT — FIBROSIS 4 INDEX: FIB4 SCORE: 2.8

## 2020-08-17 NOTE — ANESTHESIA PREPROCEDURE EVALUATION
Relevant Problems   NEURO   (+) S/P placement of cardiac pacemaker-MDT Micra leadless PPM      CARDIAC   (+) AF (atrial fibrillation) (HCC)      ENDO   (+) Hypothyroidism       Physical Exam    Airway   Mallampati: II  TM distance: >3 FB  Neck ROM: full       Cardiovascular - normal exam  Rhythm: regular  Rate: normal  (-) murmur     Dental - normal exam           Pulmonary - normal exam  Breath sounds clear to auscultation     Abdominal    Neurological - normal exam                 Anesthesia Plan    ASA 3   ASA physical status 3 criteria: other (comment)    Plan - MAC             Induction: intravenous    Postoperative Plan: Postoperative administration of opioids is intended.    Pertinent diagnostic labs and testing reviewed    Informed Consent:    Anesthetic plan and risks discussed with patient.    Use of blood products discussed with: patient whom consented to blood products.

## 2020-08-17 NOTE — ANESTHESIA QCDR
2019 [facility] Clinical Data Registry (for Quality Improvement)     Postoperative nausea/vomiting risk protocol (Adult = 18 yrs and Pediatric 3-17 yrs)- (430 and 463)  General inhalation anesthetic (NOT TIVA) with PONV risk factors: No  Provision of anti-emetic therapy with at least 2 different classes of agents: N/A  Patient DID NOT receive anti-emetic therapy and reason is documented in Medical Record: N/A    Multimodal Pain Management- (477)  Non-emergent surgery AND patient age >= 18: No  Use of Multimodal Pain Management, two or more drugs and/or interventions, NOT including systemic opioids:   Exception: Documented allergy to multiple classes of analgesics:     Smoking Abstinence (404)  Patient is current smoker (cigarette, pipe, e-cig, marijuanna): No  Elective Surgery:   Abstinence instructions provided prior to day of surgery:   Patient abstained from smoking on day of surgery:     Pre-Op Beta-Blocker in Isolated CABG (44)  Isolated CABG AND patient age >= 18: No  Beta-blocker admin within 24 hours of surgical incision:   Exception:of medical reason(s) for not administering beta blocker within 24 hours prior to surgical incision (e.g., not  indicated,other medical reason):     PACU assessment of acute postoperative pain prior to Anesthesia Care End- Applies to Patients Age = 18- (ABG7)  Initial PACU pain score is which of the following: < 7/10  Patient unable to report pain score: N/A    Post-anesthetic transfer of care checklist/protocol to PACU/ICU- (426 and 427)  Upon conclusion of case, patient transferred to which of the following locations: PACU/Non-ICU  Use of transfer checklist/protocol: Yes  Exclusion: Service Performed in Patient Hospital Room (and thus did not require transfer): N/A  Unplanned admission to ICU related to anesthesia service up through end of PACU care- (MD51)  Unplanned admission to ICU (not initially anticipated at anesthesia start time): No

## 2020-08-17 NOTE — ANESTHESIA TIME REPORT
Anesthesia Start and Stop Event Times     Date Time Event    8/17/2020 1112 Ready for Procedure     1130 Anesthesia Start     1205 Anesthesia Stop        Responsible Staff  08/17/20    Name Role Begin End    Darell Elizondo M.D. Anesth 1130 1205        Preop Diagnosis (Free Text):  Pre-op Diagnosis             Preop Diagnosis (Codes):    Post op Diagnosis  Atrial fibrillation (HCC)      Premium Reason  Non-Premium    Comments:

## 2020-08-17 NOTE — DISCHARGE INSTRUCTIONS
ACTIVITY: Rest and take it easy for the first 24 hours.  A responsible adult is recommended to remain with you during that time.  It is normal to feel sleepy.  We encourage you to not do anything that requires balance, judgment or coordination.    MILD FLU-LIKE SYMPTOMS ARE NORMAL. YOU MAY EXPERIENCE GENERALIZED MUSCLE ACHES, THROAT IRRITATION, HEADACHE AND/OR SOME NAUSEA.    FOR 24 HOURS DO NOT:  Drive, operate machinery or run household appliances.  Drink beer or alcoholic beverages.   Make important decisions or sign legal documents.    SPECIAL INSTRUCTIONS: Follow up with your Cardiologist. Use ice on lip for swelling. Use cold liquids to soothe throat.  Resume home medications.    DIET: To avoid nausea, slowly advance diet as tolerated, avoiding spicy or greasy foods for the first day.  Add more substantial food to your diet according to your physician's instructions.  Babies can be fed formula or breast milk as soon as they are hungry.  INCREASE FLUIDS AND FIBER TO AVOID CONSTIPATION.    FOLLOW-UP APPOINTMENT:  A follow-up appointment should be arranged with your doctor; call to schedule.    You should CALL YOUR PHYSICIAN if you develop:  Fever greater than 101 degrees F.  Pain not relieved by medication, or persistent nausea or vomiting.  Excessive bleeding (blood soaking through dressing) or unexpected drainage from the wound.  Extreme redness or swelling around the incision site, drainage of pus or foul smelling drainage.  Inability to urinate or empty your bladder within 8 hours.  Problems with breathing or chest pain.    You should call 911 if you develop problems with breathing or chest pain.  If you are unable to contact your doctor or surgical center, you should go to the nearest emergency room or urgent care center.  Physician's telephone #: [phone]    If any questions arise, call your doctor.  If your doctor is not available, please feel free to call the Surgical Center at [phone].  The  Center is open Monday through Friday from 7AM to 7PM.  You can also call the HEALTH HOTLINE open 24 hours/day, 7 days/week and speak to a nurse at [phone], or toll free at [phone].    A registered nurse may call you a few days after your surgery to see how you are doing after your procedure.    MEDICATIONS: Resume taking daily medication.  Take prescribed pain medication with food.  If no medication is prescribed, you may take non-aspirin pain medication if needed.  PAIN MEDICATION CAN BE VERY CONSTIPATING.  Take a stool softener or laxative such as senokot, pericolace, or milk of magnesia if needed.    If your physician has prescribed pain medication that includes Acetaminophen (Tylenol), do not take additional Acetaminophen (Tylenol) while taking the prescribed medication.    Depression / Suicide Risk    As you are discharged from this [facility] facility, it is important to learn how to keep safe from harming yourself.    Recognize the warning signs:  · Abrupt changes in personality, positive or negative- including increase in energy   · Giving away possessions  · Change in eating patterns- significant weight changes-  positive or negative  · Change in sleeping patterns- unable to sleep or sleeping all the time   · Unwillingness or inability to communicate  · Depression  · Unusual sadness, discouragement and loneliness  · Talk of wanting to die  · Neglect of personal appearance   · Rebelliousness- reckless behavior  · Withdrawal from people/activities they love  · Confusion- inability to concentrate     If you or a loved one observes any of these behaviors or has concerns about self-harm, here's what you can do:  · Talk about it- your feelings and reasons for harming yourself  · Remove any means that you might use to hurt yourself (examples: pills, rope, extension cords, firearm)  · Get professional help from the community (Mental Health, Substance Abuse, psychological counseling)  · Do not be  alone:Call your Safe Contact- someone whom you trust who will be there for you.  · Call your local CRISIS HOTLINE 648-2781 or 826-937-9904  · Call your local Children's Mobile Crisis Response Team [place] (558) 258-2487 or www.[employer]  · Call the toll free National Suicide Prevention Hotlines   · National Suicide Prevention Lifeline 994-618-XBFG (0689)  · National [employer] 800-SUICIDE (279-2069)

## 2020-08-17 NOTE — OR NURSING
1204: Patient arrived from echo procedure room with anesthesiologist and RN. Patient arouses to voice. Updated on POC.    1249: Criteria met to discharge patient.    1310: Discharge paperwork reviewed with patient and significant other. Discussed activity, site care, worsening symptoms, and follow-up. Verbalized understanding. No further questions. PIV removed, tip intact.    1320: Patient escorted out in wheelchair with RN. Discharge instructions and personal belongings in possession of the patient. Copy of discharge instructions signed and placed in the chart. Patient discharged to home with significant other.

## 2020-08-17 NOTE — ANESTHESIA POSTPROCEDURE EVALUATION
Patient: Lindsay Landrum    Procedure Summary     Date: 08/17/20 Room / Location: [facility] - ECHOCARDIOLOGY [facility]    Anesthesia Start: 1130 Anesthesia Stop: 1205    Procedure: EC-TOM W/O CONT Diagnosis:       Severe mitral regurgitation      Nonrheumatic mitral (valve) insufficiency    Scheduled Providers: Yury Mckenna M.D. Responsible Provider: Darell Elizondo M.D.    Anesthesia Type: MAC ASA Status: 3          Final Anesthesia Type: MAC  Last vitals  BP   Blood Pressure : 116/56    Temp   36.6 °C (97.9 °F)    Pulse   Pulse: 70   Resp   20    SpO2   99 %      Anesthesia Post Evaluation    Patient location during evaluation: PACU  Patient participation: complete - patient participated  Level of consciousness: awake and alert  Pain score: 0    Airway patency: patent  Anesthetic complications: no  Cardiovascular status: hemodynamically stable  Respiratory status: acceptable  Hydration status: euvolemic    PONV: none           Nurse Pain Score: 0 (NPRS)

## 2020-08-17 NOTE — NON-PROVIDER
S/p new Micra VR pacemaker (explanted ppm-see OP note.) Groin site examined by MARCO A Beard. Appropriate device function demonstrated. See back in 5 weeks.

## 2020-08-19 ENCOUNTER — OFFICE VISIT (OUTPATIENT)
Dept: CARDIOTHORACIC SURGERY | Facility: MEDICAL CENTER | Age: 85
End: 2020-08-19
Payer: MEDICARE

## 2020-08-19 VITALS
HEIGHT: 62 IN | SYSTOLIC BLOOD PRESSURE: 98 MMHG | BODY MASS INDEX: 19.69 KG/M2 | HEART RATE: 63 BPM | DIASTOLIC BLOOD PRESSURE: 50 MMHG | OXYGEN SATURATION: 98 % | WEIGHT: 107 LBS | TEMPERATURE: 97.5 F

## 2020-08-19 DIAGNOSIS — I34.0 SEVERE MITRAL REGURGITATION: ICD-10-CM

## 2020-08-19 PROCEDURE — 99205 OFFICE O/P NEW HI 60 MIN: CPT | Performed by: THORACIC SURGERY (CARDIOTHORACIC VASCULAR SURGERY)

## 2020-08-19 SDOH — HEALTH STABILITY: MENTAL HEALTH: HOW OFTEN DO YOU HAVE A DRINK CONTAINING ALCOHOL?: 4 OR MORE TIMES A WEEK

## 2020-08-19 SDOH — HEALTH STABILITY: MENTAL HEALTH: HOW MANY STANDARD DRINKS CONTAINING ALCOHOL DO YOU HAVE ON A TYPICAL DAY?: 1 OR 2

## 2020-08-19 ASSESSMENT — ENCOUNTER SYMPTOMS
SHORTNESS OF BREATH: 1
DIZZINESS: 1
BLURRED VISION: 1
MUSCULOSKELETAL NEGATIVE: 1
GASTROINTESTINAL NEGATIVE: 1
CARDIOVASCULAR NEGATIVE: 1
PSYCHIATRIC NEGATIVE: 1

## 2020-08-19 ASSESSMENT — FIBROSIS 4 INDEX: FIB4 SCORE: 2.8

## 2020-08-24 ENCOUNTER — OFFICE VISIT (OUTPATIENT)
Dept: CARDIOLOGY | Facility: MEDICAL CENTER | Age: 85
End: 2020-08-24
Payer: MEDICARE

## 2020-08-24 VITALS
DIASTOLIC BLOOD PRESSURE: 62 MMHG | SYSTOLIC BLOOD PRESSURE: 122 MMHG | BODY MASS INDEX: 19.77 KG/M2 | RESPIRATION RATE: 12 BRPM | HEART RATE: 70 BPM | OXYGEN SATURATION: 96 % | HEIGHT: 62 IN | WEIGHT: 107.4 LBS

## 2020-08-24 DIAGNOSIS — Z98.890 STATUS POST IMPLANTATION OF MITRAL VALVE LEAFLET CLIP: ICD-10-CM

## 2020-08-24 DIAGNOSIS — Z95.0 S/P PLACEMENT OF CARDIAC PACEMAKER: ICD-10-CM

## 2020-08-24 DIAGNOSIS — I48.91 ATRIAL FIBRILLATION, UNSPECIFIED TYPE (HCC): Chronic | ICD-10-CM

## 2020-08-24 DIAGNOSIS — Z95.818 STATUS POST IMPLANTATION OF MITRAL VALVE LEAFLET CLIP: ICD-10-CM

## 2020-08-24 DIAGNOSIS — I34.0 MITRAL VALVE INSUFFICIENCY, UNSPECIFIED ETIOLOGY: ICD-10-CM

## 2020-08-24 DIAGNOSIS — Z98.890 HISTORY OF REPAIR OF MITRAL VALVE: ICD-10-CM

## 2020-08-24 DIAGNOSIS — Z79.01 CHRONIC ANTICOAGULATION: Chronic | ICD-10-CM

## 2020-08-24 PROCEDURE — 99204 OFFICE O/P NEW MOD 45 MIN: CPT | Performed by: INTERNAL MEDICINE

## 2020-08-24 ASSESSMENT — FIBROSIS 4 INDEX: FIB4 SCORE: 2.8

## 2020-08-24 ASSESSMENT — ENCOUNTER SYMPTOMS
CLAUDICATION: 0
SHORTNESS OF BREATH: 1
WEIGHT LOSS: 0
HEARTBURN: 1
DEPRESSION: 0
WEAKNESS: 0
BLURRED VISION: 0
EYES NEGATIVE: 1
BRUISES/BLEEDS EASILY: 0
CARDIOVASCULAR NEGATIVE: 1
HEADACHES: 0
FEVER: 0
DIZZINESS: 0
NERVOUS/ANXIOUS: 0
ABDOMINAL PAIN: 0
NEUROLOGICAL NEGATIVE: 1
NAUSEA: 0
MYALGIAS: 0
FOCAL WEAKNESS: 0
INSOMNIA: 1
DOUBLE VISION: 0
COUGH: 0
VOMITING: 0
MUSCULOSKELETAL NEGATIVE: 1
PALPITATIONS: 0
CHILLS: 0

## 2020-08-24 NOTE — PROGRESS NOTES
Chief Complaint   Patient presents with   • Atrial Fibrillation       Subjective:   Lindsay Landrum is a 87 y.o. female who presents today for interventional consult/MitraClip evaluation requested by Raz Richter for severe symptomatic mitral regurgitation.    Thank you for allowing me to evaluate Mrs. Landrum, who as you know is a 87 year old female with cardiac history of MitraClip at [facility] in 2018, atrial fibrillation with pacemaker placement on chronic anticoagulation therapy. She admits to fatigue, shortness of breath. She denies chest pain, palpitations, nausea/vomiting or diaphoresis. She was evaluated by Raz Richter on 08/19/20 who concluded that she was not candidate for surgical aortic valve replacement or repeat MitraClip repair. She was scheduled for second optinion.    Past Medical History:   Diagnosis Date   • AF (atrial fibrillation) (HCC) 5/21/2012   • Bowel habit changes     takes miralax   • Breath shortness     with a fib   • Cataract     surgery   • Chronic anticoagulation 5/21/2012   • Heart burn    • Heart valve disease    • Hemorrhagic disorder (HCC)     transfusions x2   • High cholesterol    • Hyperlipidemia 5/21/2012   • Hypothyroidism 5/21/2012   • Indigestion    • Pacemaker    • Urinary incontinence     after procedure   • Vitamin d deficiency 5/21/2012     Past Surgical History:   Procedure Laterality Date   • ABDOMINAL HYSTERECTOMY TOTAL     • CATARACT EXTRACTION WITH IOL     • GYN SURGERY      hysterectomy   • OTHER CARDIAC SURGERY      pacemaker     Family History   Problem Relation Age of Onset   • No Known Problems Mother    • Heart Disease Father    • Hypertension Father    • Lupus Sister      Social History     Socioeconomic History   • Marital status: Single     Spouse name: Not on file   • Number of children: Not on file   • Years of education: Not on file   • Highest education level: Not on file   Occupational History   • Not on file    Social Needs   • Financial resource strain: Not on file   • Food insecurity     Worry: Not on file     Inability: Not on file   • Transportation needs     Medical: Not on file     Non-medical: Not on file   Tobacco Use   • Smoking status: Never Smoker   • Smokeless tobacco: Never Used   Substance and Sexual Activity   • Alcohol use: Yes     Frequency: 4 or more times a week     Drinks per session: 1 or 2   • Drug use: No   • Sexual activity: Not on file   Lifestyle   • Physical activity     Days per week: Not on file     Minutes per session: Not on file   • Stress: Not on file   Relationships   • Social connections     Talks on phone: Not on file     Gets together: Not on file     Attends [employer] service: Not on file     Active member of club or organization: Not on file     Attends meetings of clubs or organizations: Not on file     Relationship status: Not on file   • Intimate partner violence     Fear of current or ex partner: Not on file     Emotionally abused: Not on file     Physically abused: Not on file     Forced sexual activity: Not on file   Other Topics Concern   • Not on file   Social History Narrative   • Not on file     No Known Allergies     (Medications reviewed.)  Outpatient Encounter Medications as of 8/24/2020   Medication Sig Dispense Refill   • MAGNESIUM PO Take 1 Tab by mouth every day.     • diltiazem (TIAZAC) 240 MG SR capsule      • folic acid (FOLVITE) 1 MG Tab      • levothyroxine (SYNTHROID) 75 MCG Tab      • spironolactone (ALDACTONE) 25 MG Tab      • furosemide (LASIX) 40 MG Tab      • JANTOVEN 4 MG Tab      • levothyroxine (SYNTHROID) 50 MCG Tab Take 1 Tab by mouth every day. 30 Tab 0   • lovastatin (MEVACOR) 20 MG Tab Take 1 Tab by mouth every bedtime. 30 Tab 0   • warfarin (COUMADIN) 5 MG TABS Take 1 -1 1/2 tablets daily or as directed by [facility]. 90 Tab 4     No facility-administered encounter medications on file as of 8/24/2020.      Review of Systems   Constitutional:  "Positive for malaise/fatigue. Negative for chills, fever and weight loss.   HENT: Negative.  Negative for hearing loss.    Eyes: Negative.  Negative for blurred vision and double vision.   Respiratory: Positive for shortness of breath. Negative for cough.    Cardiovascular: Negative.  Negative for chest pain, palpitations, claudication and leg swelling.   Gastrointestinal: Positive for heartburn. Negative for abdominal pain, nausea and vomiting.   Genitourinary: Negative.  Negative for dysuria and urgency.   Musculoskeletal: Negative.  Negative for joint pain and myalgias.   Skin: Positive for itching. Negative for rash.   Neurological: Negative.  Negative for dizziness, focal weakness, weakness and headaches.   Endo/Heme/Allergies: Negative.  Does not bruise/bleed easily.   Psychiatric/Behavioral: Negative for depression. The patient has insomnia. The patient is not nervous/anxious.         Objective:   /62 (BP Location: Left arm, Patient Position: Sitting, BP Cuff Size: Adult)   Pulse 70   Resp 12   Ht 1.575 m (5' 2\")   Wt 48.7 kg (107 lb 6.4 oz)   SpO2 96%   BMI 19.64 kg/m²     Physical Exam   Constitutional: She is oriented to person, place, and time. She appears well-developed.   HENT:   Head: Normocephalic and atraumatic.   Eyes: EOM are normal.   Neck: No JVD present.   Cardiovascular: Normal rate and regular rhythm.   Murmur heard.  Pulmonary/Chest: Effort normal and breath sounds normal.   Abdominal: Soft. Bowel sounds are normal.   No hepatosplenomegaly.   Musculoskeletal: Normal range of motion.   Lymphadenopathy:     She has no cervical adenopathy.   Neurological: She is alert and oriented to person, place, and time.   Skin: Skin is warm and dry.   Psychiatric: She has a normal mood and affect.     CARDIAC STUDIES/PROCEDURES:    ECHOCARDIOGRAM CONCLUSIONS (07/31/20)  No prior study is available for comparison.   Borderline reduced left ventricular systolic function.  Left ventricular ejection " fraction is visually estimated to be 50%.  Known transcatheter mitral valve clip with appropriate transvalvular gradient, severe mitral regurgitation.  Moderate aortic insufficiency.  Moderate tricuspid regurgitation.  Consideration of TOM for closer evaluation of mitral regurgtation severity.  (study result reviewed)    EKG performed on (08/03/20) was reviewed: EKG personally interpreted shows paced rhythm.    Laboratory results of (08/04/20) were reviewed. Bun of 32 mg/dl, creatinine levels of 1.43 mg/dl noted.    PACEMAKER PLACEMENT by Jasper Garcia (08/03/20)  Micra pacemaker implantation.    TRANSESOPHAGEAL ECHOCARDIOGRAM CONCLUSIONS by Yury Hanks (08/17/20)  Numerous attempts were made to pass the probe.    An NG tube was placed successfully into the stomach under visualization by glide scope.    A pediatric probe followed a similar pathway and was able to be passed   but had limited resolution.  Pictures were not taken.  The normal TOM   probe was passed under glide scope visualization to the same anatomic   location but there was a stenosis and the probe could not be passed   numerous times.  Anesthesia was assisting the entire time under glide   scope visualization.  (study result reviewed)    Assessment:     1. History of repair of mitral valve     2. Atrial fibrillation, unspecified type (HCC)     3. S/P placement of cardiac pacemaker-MDT Micra leadless PPM     4. Chronic anticoagulation         Medical Decision Making:  Today's Assessment / Status / Plan:     1. Status post transcatheter percutaneous mitral valve repair (MitraClip) at [facility] in 2018: I agree with Raz Richter that she is not a candidate for surgical aortic valve replacement or repeat MitraClip repair as she is high risk for surgery and unable to perform transesophageal echocardiogram as well. She will be referred to [facility] as requested.    We will see the patient as  needed.    Thank you for this consult.    Cc Jasper Garcia

## 2020-09-15 ENCOUNTER — TELEPHONE (OUTPATIENT)
Dept: CARDIOLOGY | Facility: MEDICAL CENTER | Age: 85
End: 2020-09-15

## 2020-09-15 NOTE — TELEPHONE ENCOUNTER
To ARNULFO/DANIELITO - ok to order testing listed requested by [facility]?    Received a call from Brigitte OLIVERA with [facility].   They have decided to proceed with MV replacement, possible AV and TV replacement.    Before proceeding they would like her to have a LHC, RHC, CT chest w/o contrast, Carotid US and would like her esophogeal stricture worked up with GI - would need referral (an EGD due to jessica not being able to be completed)    All results faxed to [phone]    To call Lesley  at [phone] ext [phone] with POC

## 2020-09-17 DIAGNOSIS — Z01.810 PRE-OPERATIVE CARDIOVASCULAR EXAMINATION: ICD-10-CM

## 2020-09-17 DIAGNOSIS — Z98.890 HISTORY OF REPAIR OF MITRAL VALVE: ICD-10-CM

## 2020-09-17 DIAGNOSIS — K22.2 ESOPHAGEAL STRICTURE: ICD-10-CM

## 2020-09-17 DIAGNOSIS — I34.0 MITRAL VALVE INSUFFICIENCY, UNSPECIFIED ETIOLOGY: ICD-10-CM

## 2020-09-17 DIAGNOSIS — I34.0 SEVERE MITRAL REGURGITATION: ICD-10-CM

## 2020-09-17 DIAGNOSIS — K21.9 GASTRO-ESOPHAGEAL REFLUX DISEASE WITHOUT ESOPHAGITIS: ICD-10-CM

## 2020-09-17 NOTE — PROGRESS NOTES
Per ARNULFO and Christian request prior to MVR, TVR, AVR, following studies have been ordered:  [facility]  Carotid duplex  CT Chest w/o  Referral to Gastroenterology for esophageal stricture

## 2020-09-22 ENCOUNTER — HOSPITAL ENCOUNTER (OUTPATIENT)
Dept: RADIOLOGY | Facility: MEDICAL CENTER | Age: 85
End: 2020-09-22
Attending: INTERNAL MEDICINE
Payer: MEDICARE

## 2020-09-22 DIAGNOSIS — Z98.890 HISTORY OF REPAIR OF MITRAL VALVE: ICD-10-CM

## 2020-09-22 DIAGNOSIS — Z01.810 PRE-OPERATIVE CARDIOVASCULAR EXAMINATION: ICD-10-CM

## 2020-09-22 DIAGNOSIS — I34.0 SEVERE MITRAL REGURGITATION: ICD-10-CM

## 2020-09-22 DIAGNOSIS — K21.9 GASTRO-ESOPHAGEAL REFLUX DISEASE WITHOUT ESOPHAGITIS: ICD-10-CM

## 2020-09-22 DIAGNOSIS — K22.2 ESOPHAGEAL STRICTURE: ICD-10-CM

## 2020-09-22 DIAGNOSIS — I34.0 MITRAL VALVE INSUFFICIENCY, UNSPECIFIED ETIOLOGY: ICD-10-CM

## 2020-09-22 PROCEDURE — 71250 CT THORAX DX C-: CPT

## 2020-09-22 PROCEDURE — 93880 EXTRACRANIAL BILAT STUDY: CPT

## 2020-09-23 ENCOUNTER — TELEPHONE (OUTPATIENT)
Dept: CARDIOLOGY | Facility: MEDICAL CENTER | Age: 85
End: 2020-09-23

## 2020-09-23 NOTE — TELEPHONE ENCOUNTER
Annie,    I just recvd a voice message from this patient. She is trying to schedule the heart cath that Dr. Capps ordered for her. Please call her to schedule.     Thank You,  Giulia

## 2020-09-24 ENCOUNTER — TELEPHONE (OUTPATIENT)
Dept: CARDIOLOGY | Facility: MEDICAL CENTER | Age: 85
End: 2020-09-24

## 2020-09-24 NOTE — TELEPHONE ENCOUNTER
Annie,    I just recvd another voicemail from this patient requesting a call back to schedule the heart cath that Dr. Capps ordered on 9-17-20. Please call her.    Thank You,  Giulia

## 2020-09-24 NOTE — TELEPHONE ENCOUNTER
Patient is scheduled on 10-2-2020 for a R&L HRt cath with Dr. Capps. Patient was told to hold coumadin and Jantoven for 5 days prior and to hold lasix and spironolactone AM day of procedure. Patient to check in at 6:00AM for an 8:00AM procedure. Updated H&P tot be done on admit by NP. Pre admit to call patient. Patient was told to notify the Dr. Lanre Christopher that we are stopping coumadin and Jantoven.

## 2020-09-25 ENCOUNTER — HOSPITAL ENCOUNTER (OUTPATIENT)
Facility: MEDICAL CENTER | Age: 85
End: 2020-09-25
Attending: INTERNAL MEDICINE | Admitting: INTERNAL MEDICINE
Payer: MEDICARE

## 2020-09-30 ENCOUNTER — TELEPHONE (OUTPATIENT)
Dept: CARDIOLOGY | Facility: MEDICAL CENTER | Age: 85
End: 2020-09-30

## 2020-09-30 ENCOUNTER — PRE-ADMISSION TESTING (OUTPATIENT)
Dept: ADMISSIONS | Facility: MEDICAL CENTER | Age: 85
End: 2020-09-30
Attending: INTERNAL MEDICINE
Payer: MEDICARE

## 2020-09-30 VITALS — WEIGHT: 106.7 LBS | BODY MASS INDEX: 19.64 KG/M2 | HEIGHT: 62 IN

## 2020-09-30 DIAGNOSIS — Z01.812 PRE-OPERATIVE LABORATORY EXAMINATION: ICD-10-CM

## 2020-09-30 DIAGNOSIS — Z01.810 PRE-OPERATIVE CARDIOVASCULAR EXAMINATION: ICD-10-CM

## 2020-09-30 DIAGNOSIS — D64.9 ANEMIA, UNSPECIFIED TYPE: ICD-10-CM

## 2020-09-30 DIAGNOSIS — N28.9 RENAL INSUFFICIENCY: ICD-10-CM

## 2020-09-30 LAB
ALBUMIN SERPL BCP-MCNC: 3.9 G/DL (ref 3.2–4.9)
ALBUMIN/GLOB SERPL: 1.1 G/DL
ALP SERPL-CCNC: 118 U/L (ref 30–99)
ALT SERPL-CCNC: 14 U/L (ref 2–50)
ANION GAP SERPL CALC-SCNC: 17 MMOL/L (ref 7–16)
APTT PPP: 31.9 SEC (ref 24.7–36)
AST SERPL-CCNC: 28 U/L (ref 12–45)
BILIRUB SERPL-MCNC: 0.7 MG/DL (ref 0.1–1.5)
BUN SERPL-MCNC: 38 MG/DL (ref 8–22)
CALCIUM SERPL-MCNC: 9.1 MG/DL (ref 8.5–10.5)
CHLORIDE SERPL-SCNC: 99 MMOL/L (ref 96–112)
CO2 SERPL-SCNC: 20 MMOL/L (ref 20–33)
COVID ORDER STATUS COVID19: NORMAL
CREAT SERPL-MCNC: 1.88 MG/DL (ref 0.5–1.4)
EKG IMPRESSION: NORMAL
ERYTHROCYTE [DISTWIDTH] IN BLOOD BY AUTOMATED COUNT: 56.4 FL (ref 35.9–50)
GLOBULIN SER CALC-MCNC: 3.6 G/DL (ref 1.9–3.5)
GLUCOSE SERPL-MCNC: 108 MG/DL (ref 65–99)
HCT VFR BLD AUTO: 31.3 % (ref 37–47)
HGB BLD-MCNC: 9.4 G/DL (ref 12–16)
INR PPP: 1.09 (ref 0.87–1.13)
MCH RBC QN AUTO: 23.9 PG (ref 27–33)
MCHC RBC AUTO-ENTMCNC: 30 G/DL (ref 33.6–35)
MCV RBC AUTO: 79.6 FL (ref 81.4–97.8)
PLATELET # BLD AUTO: 235 K/UL (ref 164–446)
PMV BLD AUTO: 10.2 FL (ref 9–12.9)
POTASSIUM SERPL-SCNC: 4.8 MMOL/L (ref 3.6–5.5)
PROT SERPL-MCNC: 7.5 G/DL (ref 6–8.2)
PROTHROMBIN TIME: 14.5 SEC (ref 12–14.6)
RBC # BLD AUTO: 3.93 M/UL (ref 4.2–5.4)
SARS-COV-2 RNA RESP QL NAA+PROBE: NOTDETECTED
SODIUM SERPL-SCNC: 136 MMOL/L (ref 135–145)
SPECIMEN SOURCE: NORMAL
WBC # BLD AUTO: 12.6 K/UL (ref 4.8–10.8)

## 2020-09-30 PROCEDURE — 85610 PROTHROMBIN TIME: CPT

## 2020-09-30 PROCEDURE — 80053 COMPREHEN METABOLIC PANEL: CPT

## 2020-09-30 PROCEDURE — U0003 INFECTIOUS AGENT DETECTION BY NUCLEIC ACID (DNA OR RNA); SEVERE ACUTE RESPIRATORY SYNDROME CORONAVIRUS 2 (SARS-COV-2) (CORONAVIRUS DISEASE [COVID-19]), AMPLIFIED PROBE TECHNIQUE, MAKING USE OF HIGH THROUGHPUT TECHNOLOGIES AS DESCRIBED BY CMS-2020-01-R: HCPCS

## 2020-09-30 PROCEDURE — 85730 THROMBOPLASTIN TIME PARTIAL: CPT

## 2020-09-30 PROCEDURE — 36415 COLL VENOUS BLD VENIPUNCTURE: CPT

## 2020-09-30 PROCEDURE — C9803 HOPD COVID-19 SPEC COLLECT: HCPCS

## 2020-09-30 PROCEDURE — 85027 COMPLETE CBC AUTOMATED: CPT

## 2020-09-30 PROCEDURE — 93005 ELECTROCARDIOGRAM TRACING: CPT

## 2020-09-30 PROCEDURE — 93010 ELECTROCARDIOGRAM REPORT: CPT | Performed by: INTERNAL MEDICINE

## 2020-09-30 ASSESSMENT — FIBROSIS 4 INDEX: FIB4 SCORE: 2.8

## 2020-09-30 NOTE — TELEPHONE ENCOUNTER
----- Message from [facility] sent at 9/30/2020 12:30 PM PDT -----  WBC up and anemia remains with abnormal blood counts- Génesis recommends postponing cath until evaluated by PCP for possible infection/bleeding etc.     Once cleared by PCP, can reschedule angiogram for re-do MVR for [place] evaluation. SC

## 2020-09-30 NOTE — TELEPHONE ENCOUNTER
Message  Received: Today  Message Contents   [facility]; Rhonda Taylor R.N.             WBC up and anemia remains with abnormal blood counts- Génesis recommends postponing cath until evaluated by PCP for possible infection/bleeding etc.     Once cleared by PCP, can reschedule angiogram for re-do MVR for [place] evaluation. SC      ----------------------------------------------------------------    Over 30 minute spent working on this situation.    The cath has been cancelled.     Discussed the above with pt. She will restart coumadin.  Pt reports no signs of bleeding. She has hx of anemia and has needed a transfusion in the past. She reports no signs or symptoms of an acute infection. She has plans to move to [place] in 1 week however she can stay a bit longer if arranged.     She does not have a PCP. She has not yet seen GI which is a requirement per [place] for work-up. She says she was not aware of this and was frustrated she did not have this arranged. The order [facility]17 to [facility].  Phone number provided to pt.    Discussed complex situation with Ebony COLON. She recommends that pt see GI for anemia/bleeding clearance and then we can repeat the CBC in 2 weeks to see if WBC have come down.     Explained the above to pt. She states understanding. She was also advised to call [place] to update them.     Carotid, CT chest results, and lab results faxed to [place] explaining update on POC including postponed cath. [facility]ed abnormal labs to [facility].     In addition, pt has elevated creatinine. Per Ebony COLON, pt should stop lasix and repeat CBC and CMP in 2 weeks. Order placed.  Pt notified. She said she will contact Keshia STRICKLAND when she had a plan in place, she may go back to [place] first.

## 2020-10-02 ENCOUNTER — APPOINTMENT (OUTPATIENT)
Dept: CARDIOLOGY | Facility: MEDICAL CENTER | Age: 85
End: 2020-10-02
Attending: INTERNAL MEDICINE
Payer: MEDICARE

## 2020-10-03 ENCOUNTER — HOSPITAL ENCOUNTER (INPATIENT)
Facility: MEDICAL CENTER | Age: 85
LOS: 2 days | DRG: 378 | End: 2020-10-06
Attending: EMERGENCY MEDICINE | Admitting: INTERNAL MEDICINE
Payer: MEDICARE

## 2020-10-03 DIAGNOSIS — K92.2 GASTROINTESTINAL HEMORRHAGE, UNSPECIFIED GASTROINTESTINAL HEMORRHAGE TYPE: ICD-10-CM

## 2020-10-03 PROBLEM — K92.1 GASTROINTESTINAL HEMORRHAGE WITH MELENA: Status: ACTIVE | Noted: 2020-10-03

## 2020-10-03 PROBLEM — N18.9 CKD (CHRONIC KIDNEY DISEASE): Chronic | Status: ACTIVE | Noted: 2020-10-03

## 2020-10-03 PROBLEM — Z63.6 CAREGIVER BURDEN: Status: ACTIVE | Noted: 2020-10-03

## 2020-10-03 LAB
ABO + RH BLD: NORMAL
ABO GROUP BLD: NORMAL
ANION GAP SERPL CALC-SCNC: 13 MMOL/L (ref 7–16)
APTT PPP: 28.6 SEC (ref 24.7–36)
BASOPHILS # BLD AUTO: 0.4 % (ref 0–1.8)
BASOPHILS # BLD: 0.04 K/UL (ref 0–0.12)
BLD GP AB SCN SERPL QL: NORMAL
BUN SERPL-MCNC: 34 MG/DL (ref 8–22)
CALCIUM SERPL-MCNC: 8.3 MG/DL (ref 8.5–10.5)
CHLORIDE SERPL-SCNC: 99 MMOL/L (ref 96–112)
CO2 SERPL-SCNC: 22 MMOL/L (ref 20–33)
CREAT SERPL-MCNC: 1.41 MG/DL (ref 0.5–1.4)
EKG IMPRESSION: NORMAL
EOSINOPHIL # BLD AUTO: 0.04 K/UL (ref 0–0.51)
EOSINOPHIL NFR BLD: 0.4 % (ref 0–6.9)
ERYTHROCYTE [DISTWIDTH] IN BLOOD BY AUTOMATED COUNT: 52.5 FL (ref 35.9–50)
GLUCOSE SERPL-MCNC: 113 MG/DL (ref 65–99)
HCT VFR BLD AUTO: 28.7 % (ref 37–47)
HGB BLD-MCNC: 8.9 G/DL (ref 12–16)
IMM GRANULOCYTES # BLD AUTO: 0.04 K/UL (ref 0–0.11)
IMM GRANULOCYTES NFR BLD AUTO: 0.4 % (ref 0–0.9)
INR PPP: 1.16 (ref 0.87–1.13)
LYMPHOCYTES # BLD AUTO: 0.78 K/UL (ref 1–4.8)
LYMPHOCYTES NFR BLD: 8.5 % (ref 22–41)
MCH RBC QN AUTO: 23.9 PG (ref 27–33)
MCHC RBC AUTO-ENTMCNC: 31 G/DL (ref 33.6–35)
MCV RBC AUTO: 76.9 FL (ref 81.4–97.8)
MONOCYTES # BLD AUTO: 0.96 K/UL (ref 0–0.85)
MONOCYTES NFR BLD AUTO: 10.5 % (ref 0–13.4)
NEUTROPHILS # BLD AUTO: 7.28 K/UL (ref 2–7.15)
NEUTROPHILS NFR BLD: 79.8 % (ref 44–72)
NRBC # BLD AUTO: 0 K/UL
NRBC BLD-RTO: 0 /100 WBC
PLATELET # BLD AUTO: 192 K/UL (ref 164–446)
PMV BLD AUTO: 10.6 FL (ref 9–12.9)
POTASSIUM SERPL-SCNC: 3.5 MMOL/L (ref 3.6–5.5)
PROTHROMBIN TIME: 15.2 SEC (ref 12–14.6)
RBC # BLD AUTO: 3.73 M/UL (ref 4.2–5.4)
RH BLD: NORMAL
SODIUM SERPL-SCNC: 134 MMOL/L (ref 135–145)
WBC # BLD AUTO: 9.1 K/UL (ref 4.8–10.8)

## 2020-10-03 PROCEDURE — 85610 PROTHROMBIN TIME: CPT

## 2020-10-03 PROCEDURE — 85730 THROMBOPLASTIN TIME PARTIAL: CPT

## 2020-10-03 PROCEDURE — 86850 RBC ANTIBODY SCREEN: CPT

## 2020-10-03 PROCEDURE — 87186 SC STD MICRODIL/AGAR DIL: CPT

## 2020-10-03 PROCEDURE — 87086 URINE CULTURE/COLONY COUNT: CPT

## 2020-10-03 PROCEDURE — 87077 CULTURE AEROBIC IDENTIFY: CPT

## 2020-10-03 PROCEDURE — 93005 ELECTROCARDIOGRAM TRACING: CPT | Performed by: EMERGENCY MEDICINE

## 2020-10-03 PROCEDURE — 80048 BASIC METABOLIC PNL TOTAL CA: CPT

## 2020-10-03 PROCEDURE — 86901 BLOOD TYPING SEROLOGIC RH(D): CPT

## 2020-10-03 PROCEDURE — 84484 ASSAY OF TROPONIN QUANT: CPT

## 2020-10-03 PROCEDURE — 86900 BLOOD TYPING SEROLOGIC ABO: CPT

## 2020-10-03 PROCEDURE — 85025 COMPLETE CBC W/AUTO DIFF WBC: CPT

## 2020-10-03 PROCEDURE — 85520 HEPARIN ASSAY: CPT

## 2020-10-03 PROCEDURE — 99285 EMERGENCY DEPT VISIT HI MDM: CPT

## 2020-10-03 RX ORDER — WARFARIN SODIUM 4 MG/1
4 TABLET ORAL EVERY EVENING
Status: ON HOLD | COMMUNITY
End: 2020-10-06

## 2020-10-03 RX ORDER — PANTOPRAZOLE SODIUM 40 MG/1
40 TABLET, DELAYED RELEASE ORAL DAILY
Status: ON HOLD | COMMUNITY
End: 2020-10-06

## 2020-10-03 RX ORDER — LEVOTHYROXINE SODIUM 0.07 MG/1
75 TABLET ORAL EVERY MORNING
COMMUNITY

## 2020-10-03 ASSESSMENT — FIBROSIS 4 INDEX: FIB4 SCORE: 2.77

## 2020-10-04 ENCOUNTER — APPOINTMENT (OUTPATIENT)
Dept: CARDIOLOGY | Facility: MEDICAL CENTER | Age: 85
DRG: 378 | End: 2020-10-04
Attending: INTERNAL MEDICINE
Payer: MEDICARE

## 2020-10-04 PROBLEM — I34.0 SEVERE MITRAL VALVE REGURGITATION: Chronic | Status: ACTIVE | Noted: 2020-10-04

## 2020-10-04 PROBLEM — Z63.6 CAREGIVER BURDEN: Status: RESOLVED | Noted: 2020-10-03 | Resolved: 2020-10-04

## 2020-10-04 LAB
APPEARANCE UR: ABNORMAL
APTT PPP: 24.9 SEC (ref 24.7–36)
BACTERIA #/AREA URNS HPF: ABNORMAL /HPF
BASOPHILS # BLD AUTO: 0.1 % (ref 0–1.8)
BASOPHILS # BLD AUTO: 0.2 % (ref 0–1.8)
BASOPHILS # BLD AUTO: 0.3 % (ref 0–1.8)
BASOPHILS # BLD AUTO: 0.4 % (ref 0–1.8)
BASOPHILS # BLD: 0.01 K/UL (ref 0–0.12)
BASOPHILS # BLD: 0.02 K/UL (ref 0–0.12)
BASOPHILS # BLD: 0.03 K/UL (ref 0–0.12)
BASOPHILS # BLD: 0.04 K/UL (ref 0–0.12)
BILIRUB UR QL STRIP.AUTO: NEGATIVE
COLOR UR: YELLOW
EOSINOPHIL # BLD AUTO: 0.02 K/UL (ref 0–0.51)
EOSINOPHIL # BLD AUTO: 0.02 K/UL (ref 0–0.51)
EOSINOPHIL # BLD AUTO: 0.06 K/UL (ref 0–0.51)
EOSINOPHIL # BLD AUTO: 0.07 K/UL (ref 0–0.51)
EOSINOPHIL NFR BLD: 0.2 % (ref 0–6.9)
EOSINOPHIL NFR BLD: 0.2 % (ref 0–6.9)
EOSINOPHIL NFR BLD: 0.6 % (ref 0–6.9)
EOSINOPHIL NFR BLD: 0.7 % (ref 0–6.9)
EPI CELLS #/AREA URNS HPF: NEGATIVE /HPF
ERYTHROCYTE [DISTWIDTH] IN BLOOD BY AUTOMATED COUNT: 52.6 FL (ref 35.9–50)
ERYTHROCYTE [DISTWIDTH] IN BLOOD BY AUTOMATED COUNT: 52.6 FL (ref 35.9–50)
ERYTHROCYTE [DISTWIDTH] IN BLOOD BY AUTOMATED COUNT: 53 FL (ref 35.9–50)
ERYTHROCYTE [DISTWIDTH] IN BLOOD BY AUTOMATED COUNT: 53.2 FL (ref 35.9–50)
GLUCOSE UR STRIP.AUTO-MCNC: NEGATIVE MG/DL
HCT VFR BLD AUTO: 27.8 % (ref 37–47)
HCT VFR BLD AUTO: 28.2 % (ref 37–47)
HCT VFR BLD AUTO: 28.6 % (ref 37–47)
HCT VFR BLD AUTO: 31.8 % (ref 37–47)
HGB BLD-MCNC: 8.5 G/DL (ref 12–16)
HGB BLD-MCNC: 8.6 G/DL (ref 12–16)
HGB BLD-MCNC: 8.9 G/DL (ref 12–16)
HGB BLD-MCNC: 9.7 G/DL (ref 12–16)
HYALINE CASTS #/AREA URNS LPF: ABNORMAL /LPF
IMM GRANULOCYTES # BLD AUTO: 0.04 K/UL (ref 0–0.11)
IMM GRANULOCYTES # BLD AUTO: 0.07 K/UL (ref 0–0.11)
IMM GRANULOCYTES # BLD AUTO: 0.07 K/UL (ref 0–0.11)
IMM GRANULOCYTES # BLD AUTO: 0.08 K/UL (ref 0–0.11)
IMM GRANULOCYTES NFR BLD AUTO: 0.4 % (ref 0–0.9)
IMM GRANULOCYTES NFR BLD AUTO: 0.7 % (ref 0–0.9)
IMM GRANULOCYTES NFR BLD AUTO: 0.7 % (ref 0–0.9)
IMM GRANULOCYTES NFR BLD AUTO: 0.8 % (ref 0–0.9)
INR PPP: 1.2 (ref 0.87–1.13)
KETONES UR STRIP.AUTO-MCNC: NEGATIVE MG/DL
LEUKOCYTE ESTERASE UR QL STRIP.AUTO: ABNORMAL
LV EJECT FRACT  99904: 50
LV EJECT FRACT MOD 2C 99903: 68.91
LV EJECT FRACT MOD 4C 99902: 38.39
LV EJECT FRACT MOD BP 99901: 56.71
LYMPHOCYTES # BLD AUTO: 0.55 K/UL (ref 1–4.8)
LYMPHOCYTES # BLD AUTO: 0.78 K/UL (ref 1–4.8)
LYMPHOCYTES # BLD AUTO: 0.81 K/UL (ref 1–4.8)
LYMPHOCYTES # BLD AUTO: 1.03 K/UL (ref 1–4.8)
LYMPHOCYTES NFR BLD: 10.4 % (ref 22–41)
LYMPHOCYTES NFR BLD: 5.6 % (ref 22–41)
LYMPHOCYTES NFR BLD: 8.1 % (ref 22–41)
LYMPHOCYTES NFR BLD: 9.1 % (ref 22–41)
MCH RBC QN AUTO: 23.3 PG (ref 27–33)
MCH RBC QN AUTO: 23.4 PG (ref 27–33)
MCH RBC QN AUTO: 23.5 PG (ref 27–33)
MCH RBC QN AUTO: 24.1 PG (ref 27–33)
MCHC RBC AUTO-ENTMCNC: 30.5 G/DL (ref 33.6–35)
MCHC RBC AUTO-ENTMCNC: 30.5 G/DL (ref 33.6–35)
MCHC RBC AUTO-ENTMCNC: 30.6 G/DL (ref 33.6–35)
MCHC RBC AUTO-ENTMCNC: 31.1 G/DL (ref 33.6–35)
MCV RBC AUTO: 76.4 FL (ref 81.4–97.8)
MCV RBC AUTO: 76.4 FL (ref 81.4–97.8)
MCV RBC AUTO: 77 FL (ref 81.4–97.8)
MCV RBC AUTO: 77.3 FL (ref 81.4–97.8)
MICRO URNS: ABNORMAL
MONOCYTES # BLD AUTO: 0.8 K/UL (ref 0–0.85)
MONOCYTES # BLD AUTO: 0.88 K/UL (ref 0–0.85)
MONOCYTES # BLD AUTO: 0.92 K/UL (ref 0–0.85)
MONOCYTES # BLD AUTO: 1.04 K/UL (ref 0–0.85)
MONOCYTES NFR BLD AUTO: 10.8 % (ref 0–13.4)
MONOCYTES NFR BLD AUTO: 9 % (ref 0–13.4)
MONOCYTES NFR BLD AUTO: 9 % (ref 0–13.4)
MONOCYTES NFR BLD AUTO: 9.3 % (ref 0–13.4)
NEUTROPHILS # BLD AUTO: 7.23 K/UL (ref 2–7.15)
NEUTROPHILS # BLD AUTO: 7.66 K/UL (ref 2–7.15)
NEUTROPHILS # BLD AUTO: 7.83 K/UL (ref 2–7.15)
NEUTROPHILS # BLD AUTO: 8.23 K/UL (ref 2–7.15)
NEUTROPHILS NFR BLD: 79 % (ref 44–72)
NEUTROPHILS NFR BLD: 79.4 % (ref 44–72)
NEUTROPHILS NFR BLD: 81.2 % (ref 44–72)
NEUTROPHILS NFR BLD: 83.8 % (ref 44–72)
NITRITE UR QL STRIP.AUTO: NEGATIVE
NRBC # BLD AUTO: 0 K/UL
NRBC BLD-RTO: 0 /100 WBC
PH UR STRIP.AUTO: 5.5 [PH] (ref 5–8)
PLATELET # BLD AUTO: 147 K/UL (ref 164–446)
PLATELET # BLD AUTO: 182 K/UL (ref 164–446)
PLATELET # BLD AUTO: 185 K/UL (ref 164–446)
PLATELET # BLD AUTO: 218 K/UL (ref 164–446)
PMV BLD AUTO: 10.5 FL (ref 9–12.9)
PMV BLD AUTO: 10.7 FL (ref 9–12.9)
PMV BLD AUTO: 10.7 FL (ref 9–12.9)
PMV BLD AUTO: 10.8 FL (ref 9–12.9)
PROT UR QL STRIP: 100 MG/DL
PROTHROMBIN TIME: 15.6 SEC (ref 12–14.6)
RBC # BLD AUTO: 3.64 M/UL (ref 4.2–5.4)
RBC # BLD AUTO: 3.66 M/UL (ref 4.2–5.4)
RBC # BLD AUTO: 3.7 M/UL (ref 4.2–5.4)
RBC # BLD AUTO: 4.16 M/UL (ref 4.2–5.4)
RBC # URNS HPF: ABNORMAL /HPF
RBC UR QL AUTO: ABNORMAL
SP GR UR STRIP.AUTO: 1.02
TROPONIN T SERPL-MCNC: 35 NG/L (ref 6–19)
UFH PPP CHRO-ACNC: 0.11 IU/ML
UFH PPP CHRO-ACNC: 0.32 IU/ML
UFH PPP CHRO-ACNC: <0.1 IU/ML
UROBILINOGEN UR STRIP.AUTO-MCNC: 0.2 MG/DL
WBC # BLD AUTO: 8.9 K/UL (ref 4.8–10.8)
WBC # BLD AUTO: 9.7 K/UL (ref 4.8–10.8)
WBC # BLD AUTO: 9.8 K/UL (ref 4.8–10.8)
WBC # BLD AUTO: 9.9 K/UL (ref 4.8–10.8)
WBC #/AREA URNS HPF: ABNORMAL /HPF

## 2020-10-04 PROCEDURE — 93321 DOPPLER ECHO F-UP/LMTD STD: CPT | Mod: 26 | Performed by: INTERNAL MEDICINE

## 2020-10-04 PROCEDURE — 36415 COLL VENOUS BLD VENIPUNCTURE: CPT

## 2020-10-04 PROCEDURE — 93308 TTE F-UP OR LMTD: CPT

## 2020-10-04 PROCEDURE — 93325 DOPPLER ECHO COLOR FLOW MAPG: CPT | Mod: 26 | Performed by: INTERNAL MEDICINE

## 2020-10-04 PROCEDURE — 85025 COMPLETE CBC W/AUTO DIFF WBC: CPT | Mod: 91

## 2020-10-04 PROCEDURE — 700105 HCHG RX REV CODE 258: Performed by: INTERNAL MEDICINE

## 2020-10-04 PROCEDURE — 700111 HCHG RX REV CODE 636 W/ 250 OVERRIDE (IP): Performed by: INTERNAL MEDICINE

## 2020-10-04 PROCEDURE — 85610 PROTHROMBIN TIME: CPT

## 2020-10-04 PROCEDURE — 700102 HCHG RX REV CODE 250 W/ 637 OVERRIDE(OP): Performed by: INTERNAL MEDICINE

## 2020-10-04 PROCEDURE — 85730 THROMBOPLASTIN TIME PARTIAL: CPT

## 2020-10-04 PROCEDURE — 700101 HCHG RX REV CODE 250: Performed by: INTERNAL MEDICINE

## 2020-10-04 PROCEDURE — 99222 1ST HOSP IP/OBS MODERATE 55: CPT | Performed by: INTERNAL MEDICINE

## 2020-10-04 PROCEDURE — C9113 INJ PANTOPRAZOLE SODIUM, VIA: HCPCS | Performed by: INTERNAL MEDICINE

## 2020-10-04 PROCEDURE — 85520 HEPARIN ASSAY: CPT

## 2020-10-04 PROCEDURE — 770020 HCHG ROOM/CARE - TELE (206)

## 2020-10-04 PROCEDURE — 93308 TTE F-UP OR LMTD: CPT | Mod: 26 | Performed by: INTERNAL MEDICINE

## 2020-10-04 PROCEDURE — 81001 URINALYSIS AUTO W/SCOPE: CPT

## 2020-10-04 PROCEDURE — A9270 NON-COVERED ITEM OR SERVICE: HCPCS | Performed by: INTERNAL MEDICINE

## 2020-10-04 PROCEDURE — 99223 1ST HOSP IP/OBS HIGH 75: CPT | Mod: AI | Performed by: INTERNAL MEDICINE

## 2020-10-04 RX ORDER — DILTIAZEM HYDROCHLORIDE 240 MG/1
240 CAPSULE, COATED, EXTENDED RELEASE ORAL
Status: DISCONTINUED | OUTPATIENT
Start: 2020-10-04 | End: 2020-10-06 | Stop reason: HOSPADM

## 2020-10-04 RX ORDER — UBIDECARENONE 75 MG
100 CAPSULE ORAL DAILY
Status: DISCONTINUED | OUTPATIENT
Start: 2020-10-04 | End: 2020-10-06 | Stop reason: HOSPADM

## 2020-10-04 RX ORDER — FUROSEMIDE 40 MG/1
40 TABLET ORAL DAILY
Status: DISCONTINUED | OUTPATIENT
Start: 2020-10-04 | End: 2020-10-04

## 2020-10-04 RX ORDER — HEPARIN SODIUM 5000 [USP'U]/100ML
0-30 INJECTION, SOLUTION INTRAVENOUS CONTINUOUS
Status: DISCONTINUED | OUTPATIENT
Start: 2020-10-04 | End: 2020-10-06 | Stop reason: HOSPADM

## 2020-10-04 RX ORDER — SODIUM CHLORIDE 9 MG/ML
INJECTION, SOLUTION INTRAVENOUS CONTINUOUS
Status: DISCONTINUED | OUTPATIENT
Start: 2020-10-04 | End: 2020-10-06 | Stop reason: HOSPADM

## 2020-10-04 RX ORDER — SPIRONOLACTONE 25 MG/1
25 TABLET ORAL DAILY
Status: DISCONTINUED | OUTPATIENT
Start: 2020-10-04 | End: 2020-10-06 | Stop reason: HOSPADM

## 2020-10-04 RX ORDER — DILTIAZEM HYDROCHLORIDE 360 MG/1
240 CAPSULE, EXTENDED RELEASE ORAL EVERY MORNING
Status: DISCONTINUED | OUTPATIENT
Start: 2020-10-04 | End: 2020-10-04

## 2020-10-04 RX ORDER — FOLIC ACID 1 MG/1
1 TABLET ORAL DAILY
Status: DISCONTINUED | OUTPATIENT
Start: 2020-10-04 | End: 2020-10-06 | Stop reason: HOSPADM

## 2020-10-04 RX ORDER — PANTOPRAZOLE SODIUM 40 MG/10ML
40 INJECTION, POWDER, LYOPHILIZED, FOR SOLUTION INTRAVENOUS 2 TIMES DAILY
Status: DISCONTINUED | OUTPATIENT
Start: 2020-10-04 | End: 2020-10-05

## 2020-10-04 RX ORDER — HEPARIN SODIUM 1000 [USP'U]/ML
30 INJECTION, SOLUTION INTRAVENOUS; SUBCUTANEOUS PRN
Status: DISCONTINUED | OUTPATIENT
Start: 2020-10-04 | End: 2020-10-06 | Stop reason: HOSPADM

## 2020-10-04 RX ORDER — ACETAMINOPHEN 325 MG/1
650 TABLET ORAL EVERY 6 HOURS PRN
Status: DISCONTINUED | OUTPATIENT
Start: 2020-10-04 | End: 2020-10-06 | Stop reason: HOSPADM

## 2020-10-04 RX ORDER — LEVOTHYROXINE SODIUM 0.07 MG/1
75 TABLET ORAL EVERY MORNING
Status: DISCONTINUED | OUTPATIENT
Start: 2020-10-04 | End: 2020-10-06 | Stop reason: HOSPADM

## 2020-10-04 RX ORDER — HEPARIN SODIUM 1000 [USP'U]/ML
60 INJECTION, SOLUTION INTRAVENOUS; SUBCUTANEOUS ONCE
Status: DISCONTINUED | OUTPATIENT
Start: 2020-10-04 | End: 2020-10-04

## 2020-10-04 RX ADMIN — VITAM B12 100 MCG: 100 TAB at 05:41

## 2020-10-04 RX ADMIN — POLYETHYLENE GLYCOL 3350, SODIUM SULFATE ANHYDROUS, SODIUM BICARBONATE, SODIUM CHLORIDE, POTASSIUM CHLORIDE 4 L: 236; 22.74; 6.74; 5.86; 2.97 POWDER, FOR SOLUTION ORAL at 18:14

## 2020-10-04 RX ADMIN — SODIUM CHLORIDE: 9 INJECTION, SOLUTION INTRAVENOUS at 03:57

## 2020-10-04 RX ADMIN — CEFTRIAXONE SODIUM 1 G: 1 INJECTION, POWDER, FOR SOLUTION INTRAMUSCULAR; INTRAVENOUS at 17:53

## 2020-10-04 RX ADMIN — HEPARIN SODIUM 12 UNITS/KG/HR: 5000 INJECTION, SOLUTION INTRAVENOUS at 05:43

## 2020-10-04 RX ADMIN — LEVOTHYROXINE SODIUM 75 MCG: 0.07 TABLET ORAL at 06:00

## 2020-10-04 RX ADMIN — PANTOPRAZOLE SODIUM 40 MG: 40 INJECTION, POWDER, LYOPHILIZED, FOR SOLUTION INTRAVENOUS at 17:52

## 2020-10-04 RX ADMIN — SPIRONOLACTONE 25 MG: 25 TABLET ORAL at 05:41

## 2020-10-04 RX ADMIN — DILTIAZEM HYDROCHLORIDE 240 MG: 240 CAPSULE, COATED, EXTENDED RELEASE ORAL at 05:40

## 2020-10-04 RX ADMIN — HEPARIN SODIUM 1500 UNITS: 1000 INJECTION, SOLUTION INTRAVENOUS; SUBCUTANEOUS at 05:42

## 2020-10-04 RX ADMIN — PANTOPRAZOLE SODIUM 40 MG: 40 INJECTION, POWDER, LYOPHILIZED, FOR SOLUTION INTRAVENOUS at 06:29

## 2020-10-04 RX ADMIN — FOLIC ACID 1 MG: 1 TABLET ORAL at 05:41

## 2020-10-04 ASSESSMENT — ENCOUNTER SYMPTOMS
MUSCULOSKELETAL NEGATIVE: 1
SPUTUM PRODUCTION: 0
CARDIOVASCULAR NEGATIVE: 1
INSOMNIA: 0
ABDOMINAL PAIN: 0
HEADACHES: 0
BLURRED VISION: 0
PALPITATIONS: 1
FEVER: 0
SHORTNESS OF BREATH: 0
WHEEZING: 0
COUGH: 0
CLAUDICATION: 0
CONSTIPATION: 0
GASTROINTESTINAL NEGATIVE: 1
DIARRHEA: 0
PSYCHIATRIC NEGATIVE: 1
DEPRESSION: 0
DIAPHORESIS: 0
MYALGIAS: 1
NERVOUS/ANXIOUS: 0
WEAKNESS: 0
HEARTBURN: 0
WEIGHT LOSS: 1
DIZZINESS: 0
NEUROLOGICAL NEGATIVE: 1
EYE DISCHARGE: 0
SINUS PAIN: 0
CHILLS: 0
NAUSEA: 0
WEAKNESS: 1
EYES NEGATIVE: 1
RESPIRATORY NEGATIVE: 1
PALPITATIONS: 0
VOMITING: 0
BACK PAIN: 0
FLANK PAIN: 0

## 2020-10-04 ASSESSMENT — LIFESTYLE VARIABLES
TOTAL SCORE: 0
HOW MANY TIMES IN THE PAST YEAR HAVE YOU HAD 5 OR MORE DRINKS IN A DAY: 0
CONSUMPTION TOTAL: NEGATIVE
TOTAL SCORE: 0
EVER FELT BAD OR GUILTY ABOUT YOUR DRINKING: NO
AVERAGE NUMBER OF DAYS PER WEEK YOU HAVE A DRINK CONTAINING ALCOHOL: 4
HAVE PEOPLE ANNOYED YOU BY CRITICIZING YOUR DRINKING: NO
EVER HAD A DRINK FIRST THING IN THE MORNING TO STEADY YOUR NERVES TO GET RID OF A HANGOVER: NO
ON A TYPICAL DAY WHEN YOU DRINK ALCOHOL HOW MANY DRINKS DO YOU HAVE: 1
TOTAL SCORE: 0
ALCOHOL_USE: YES
HAVE YOU EVER FELT YOU SHOULD CUT DOWN ON YOUR DRINKING: NO
DOES PATIENT WANT TO STOP DRINKING: NO

## 2020-10-04 ASSESSMENT — COGNITIVE AND FUNCTIONAL STATUS - GENERAL
STANDING UP FROM CHAIR USING ARMS: A LOT
DRESSING REGULAR LOWER BODY CLOTHING: A LITTLE
TOILETING: A LOT
WALKING IN HOSPITAL ROOM: A LOT
DAILY ACTIVITIY SCORE: 17
MOVING FROM LYING ON BACK TO SITTING ON SIDE OF FLAT BED: A LOT
CLIMB 3 TO 5 STEPS WITH RAILING: A LOT
SUGGESTED CMS G CODE MODIFIER DAILY ACTIVITY: CK
MOBILITY SCORE: 13
TURNING FROM BACK TO SIDE WHILE IN FLAT BAD: A LITTLE
HELP NEEDED FOR BATHING: A LOT
MOVING TO AND FROM BED TO CHAIR: A LOT
SUGGESTED CMS G CODE MODIFIER MOBILITY: CL
DRESSING REGULAR UPPER BODY CLOTHING: A LOT

## 2020-10-04 ASSESSMENT — PATIENT HEALTH QUESTIONNAIRE - PHQ9
SUM OF ALL RESPONSES TO PHQ9 QUESTIONS 1 AND 2: 0
1. LITTLE INTEREST OR PLEASURE IN DOING THINGS: NOT AT ALL
2. FEELING DOWN, DEPRESSED, IRRITABLE, OR HOPELESS: NOT AT ALL

## 2020-10-04 ASSESSMENT — FIBROSIS 4 INDEX
FIB4 SCORE: 2.99
FIB4 SCORE: 3.39

## 2020-10-04 NOTE — CONSULTS
Cardiology Consult Note:    Karishma Ba M.D.  Date & Time note created:    10/4/2020   4:02 PM     Referring MD:  Dr. Sal    Patient ID:   Name:             Lindsay Landrum   YOB: 1932  Age:                 87 y.o.  female   MRN:               [id number]                                                             Chief Complaint / Reason for consult:  Complex cardiac history.    History of Present Illness:    This is an 87-year-old woman with prior history of recurrent mitral regurgitation with failed mitral clip procedure, moderate tricuspid regurgitation, persistent atrial fibrillation with controlled ventricular rate on chronic anticoagulation but stopped recently due to GI bleeding, permanent pacemaker implant complicated by infection and subsequent removal with reimplantation at the right lower abdomen, chronic kidney disease with stage III to IV with GFR of 35, presented to the hospital with dark stool. Of note, patient was being evaluated by [facility] for open heart surgery for surgical intervention of her valvular disease, she was scheduled to undergo cardiac catheterization here with Dr. Génesis stacy.  However she came into the hospital because of feeling fatigue and found to have melena.  Patient denies having chest pain.    I personally interpreted the lab test results which showed hemoglobin of 8.6.    Cardiology is been consulted for further care due to her complex cardiac history.    I personally interpreted the images her EKG tracing which showed paced rhythm.    I personally interpreted images of her transthoracic echocardiogram which showed LV function of 50%, failed mitral valve repair via mitral clip with severe mitral regurgitation.  No mitral stenosis.    Review of Systems:      Constitutional: Denies fevers, Denies weight changes  Eyes: Denies changes in vision, no eye pain  Ears/Nose/Throat/Mouth: Denies nasal congestion or sore throat   Cardiovascular: no chest pain,  no palpitations   Respiratory: yes shortness of breath , Denies cough  Gastrointestinal/Hepatic: Denies abdominal pain, nausea, vomiting, diarrhea, constipation or GI bleeding   Genitourinary: Denies dysuria or frequency  Musculoskeletal/Rheum: Denies  joint pain and swelling   Skin: Denies rash  Neurological: Denies headache, confusion, memory loss or focal weakness/parasthesias  Psychiatric: denies mood disorder   Endocrine: Brittni thyroid problems  Heme/Oncology/Lymph Nodes: Denies enlarged lymph nodes, denies brusing or known bleeding disorder  All other systems were reviewed and are negative (AMA/CMS criteria)                Past Medical History:   Past Medical History:   Diagnosis Date   • AF (atrial fibrillation) (HCC) 5/21/2012   • Anemia    • Bowel habit changes     takes miralax   • Breath shortness     with a fib   • Cataract     surgery   • Chronic anticoagulation 5/21/2012   • Heart burn    • Heart valve disease    • Hemorrhagic disorder (HCC)     transfusions x2   • High cholesterol    • History of blood transfusion    • Hyperlipidemia 5/21/2012   • Hypothyroidism 5/21/2012   • Indigestion    • Pacemaker    • Urinary incontinence     after procedure   • Vitamin d deficiency 5/21/2012     Active Hospital Problems    Diagnosis   • AF (atrial fibrillation) (HCC) [I48.91]     Priority: High   • Severe mitral valve regurgitation [I34.0]   • Gastrointestinal hemorrhage with melena [K92.1]   • CKD (chronic kidney disease) [N18.9]       Past Surgical History:  Past Surgical History:   Procedure Laterality Date   • ABDOMINAL HYSTERECTOMY TOTAL     • CATARACT EXTRACTION WITH IOL     • GYN SURGERY      hysterectomy   • OTHER CARDIAC SURGERY      pacemaker       Hospital Medications:    Current Facility-Administered Medications:   •  folic acid (FOLVITE) tablet 1 mg, 1 mg, Oral, DAILY, Mali Laureano M.D., 1 mg at 10/04/20 0541  •  levothyroxine (SYNTHROID) tablet 75 mcg, 75 mcg, Oral, QAM, Mali Laureano M.D., 75 mcg  at 10/04/20 0600  •  spironolactone (ALDACTONE) tablet 25 mg, 25 mg, Oral, DAILY, Mali Laureano M.D., 25 mg at 10/04/20 0541  •  cyanocobalamin (VITAMIN B-12) tablet 100 mcg, 100 mcg, Oral, DAILY, Mali Laureano M.D., 100 mcg at 10/04/20 0541  •  NS infusion, , Intravenous, Continuous, Mali Laureano M.D., Last Rate: 83 mL/hr at 10/04/20 0357  •  acetaminophen (TYLENOL) tablet 650 mg, 650 mg, Oral, Q6HRS PRN, Mali Laureano M.D.  •  pantoprazole (PROTONIX) injection 40 mg, 40 mg, Intravenous, BID, Mali Laureano M.D., 40 mg at 10/04/20 0629  •  DILTIAZem CD (CARDIZEM CD) capsule 240 mg, 240 mg, Oral, Q DAY, Mali Laureano M.D., 240 mg at 10/04/20 0540  •  heparin infusion 25,000 units in 500 mL 0.45% NACL, 0-30 Units/kg/hr, Intravenous, Continuous, Mali Laureano M.D., Last Rate: 15.7 mL/hr at 10/04/20 1440, 16 Units/kg/hr at 10/04/20 1440  •  heparin injection 1,500 Units, 30 Units/kg, Intravenous, PRN, Mali Laureano M.D., 1,500 Units at 10/04/20 0542  •  polyethylene glycol-electrolytes (GOLYTELY) solution 4 L, 4 L, Oral, Once, Colton Santana M.D.  •  cefTRIAXone (ROCEPHIN) 1 g in  mL IVPB, 1 g, Intravenous, Q24HRS, Emerson Sal M.D.    Current Outpatient Medications:  Medications Prior to Admission   Medication Sig Dispense Refill Last Dose   • levothyroxine (SYNTHROID) 75 MCG Tab Take 75 mcg by mouth every morning.   9/28/2020 at [facility]   • VITAMIN D PO Take 1 Tab by mouth every morning.   9/28/2020 at [facility]   • warfarin (COUMADIN) 4 MG Tab Take 4 mg by mouth every evening.   9/28/2020 at [facility]   • CALCIUM PO Take 1 Tab by mouth every morning.   9/28/2020 at [facility]   • pantoprazole (PROTONIX) 40 MG Tablet Delayed Response Take 40 mg by mouth every day.   9/28/2020 at [facility]   • Cyanocobalamin (VITAMIN B-12 PO) Take 1 Tab by mouth every day.   9/28/2020 at [facility]   • NON SPECIFIED Take 1 Tab by mouth every 48 hours. Unknown Eye Vitamin   9/28/2020 at [facility]   • MAGNESIUM PO Take 1 Tab by mouth every day.   9/28/2020 at [facility]   •  diltiazem (TIAZAC) 240 MG SR capsule Take 240 mg by mouth every morning.   9/28/2020 at [facility]   • folic acid (FOLVITE) 1 MG Tab Take 1 mg by mouth every day.   9/28/2020 at [facility]   • spironolactone (ALDACTONE) 25 MG Tab Take 25 mg by mouth every day.   9/28/2020 at [facility]   • furosemide (LASIX) 40 MG Tab Take 40 mg by mouth every day.   9/28/2020 at [facility]       Medication Allergy:  Allergies   Allergen Reactions   • Scallops Hives and Vomiting     hives       Family History:  Family History   Problem Relation Age of Onset   • No Known Problems Mother    • Heart Disease Father    • Hypertension Father    • Lupus Sister        Social History:  Social History     Socioeconomic History   • Marital status: Single     Spouse name: Not on file   • Number of children: Not on file   • Years of education: Not on file   • Highest education level: Not on file   Occupational History   • Not on file   Social Needs   • Financial resource strain: Not on file   • Food insecurity     Worry: Not on file     Inability: Not on file   • Transportation needs     Medical: Not on file     Non-medical: Not on file   Tobacco Use   • Smoking status: Never Smoker   • Smokeless tobacco: Never Used   Substance and Sexual Activity   • Alcohol use: Yes     Frequency: 4 or more times a week     Drinks per session: 1 or 2   • Drug use: No   • Sexual activity: Not on file   Lifestyle   • Physical activity     Days per week: Not on file     Minutes per session: Not on file   • Stress: Not on file   Relationships   • Social connections     Talks on phone: Not on file     Gets together: Not on file     Attends Orthodox service: Not on file     Active member of club or organization: Not on file     Attends meetings of clubs or organizations: Not on file     Relationship status: Not on file   • Intimate partner violence     Fear of current or ex partner: Not on file     Emotionally abused: Not on file     Physically abused: Not on file     Forced sexual activity:  "Not on file   Other Topics Concern   • Not on file   Social History Narrative   • Not on file         Physical Exam:  Vitals/ General Appearance:   Weight/BMI: Body mass index is 19.76 kg/m².  /50   Pulse 70   Temp 36.2 °C (97.1 °F) (Temporal)   Resp 18   Ht 1.575 m (5' 2\")   Wt 49 kg (108 lb 0.4 oz)   SpO2 90%   Vitals:    10/04/20 0136 10/04/20 0749 10/04/20 1201 10/04/20 1529   BP: 138/61 117/59 (!) 98/60 101/50   Pulse: 71 71 70 70   Resp: 16 18 18 18   Temp: 37.3 °C (99.1 °F) 36.9 °C (98.4 °F) 36.6 °C (97.8 °F) 36.2 °C (97.1 °F)   TempSrc: Temporal Temporal Temporal Temporal   SpO2: 93% 92% 91% 90%   Weight: 49 kg (108 lb 0.4 oz)      Height:         Oxygen Therapy:  Pulse Oximetry: 90 %, O2 (LPM): 0, O2 Delivery Device: None - Room Air    Constitutional:  No acute distress  HENMT:  Normocephalic, Atraumatic, Oropharynx moist mucous membranes, No oral exudates, Nose normal.  No thyromegaly.  Eyes:  EOMI, Conjunctiva normal, No discharge.  Neck:  Normal range of motion, No cervical tenderness,  no JVD.  Cardiovascular:  Normal heart rate, Normal rhythm, No murmurs, No rubs, No gallops.   Extremitites with intact distal pulses, no cyanosis, or edema.  Lungs:  Normal breath sounds, breath sounds clear to auscultation bilaterally,  no rales, no rhonchi, no wheezing.   Abdomen: Bowel sounds normal, Soft, No tenderness, No guarding, No rebound, No masses, No hepatosplenomegaly.  Skin: Warm, Dry, No erythema, No rash, no induration.  Neurologic: Alert & oriented x 3, No focal deficits noted, cranial nerves II through X are intact.  Psychiatric: Affect normal, Judgment normal, Mood normal.      MDM (Data Review):     Records reviewed and summarized in current documentation    Lab Data Review:  Recent Results (from the past 24 hour(s))   COD - Adult (Type and Screen)    Collection Time: 10/03/20  9:45 PM   Result Value Ref Range    ABO Grouping Only AB     Rh Grouping Only POS     Antibody Screen-Cod NEG  "   CBC WITH DIFFERENTIAL    Collection Time: 10/03/20  9:55 PM   Result Value Ref Range    WBC 9.1 4.8 - 10.8 K/uL    RBC 3.73 (L) 4.20 - 5.40 M/uL    Hemoglobin 8.9 (L) 12.0 - 16.0 g/dL    Hematocrit 28.7 (L) 37.0 - 47.0 %    MCV 76.9 (L) 81.4 - 97.8 fL    MCH 23.9 (L) 27.0 - 33.0 pg    MCHC 31.0 (L) 33.6 - 35.0 g/dL    RDW 52.5 (H) 35.9 - 50.0 fL    Platelet Count 192 164 - 446 K/uL    MPV 10.6 9.0 - 12.9 fL    Neutrophils-Polys 79.80 (H) 44.00 - 72.00 %    Lymphocytes 8.50 (L) 22.00 - 41.00 %    Monocytes 10.50 0.00 - 13.40 %    Eosinophils 0.40 0.00 - 6.90 %    Basophils 0.40 0.00 - 1.80 %    Immature Granulocytes 0.40 0.00 - 0.90 %    Nucleated RBC 0.00 /100 WBC    Neutrophils (Absolute) 7.28 (H) 2.00 - 7.15 K/uL    Lymphs (Absolute) 0.78 (L) 1.00 - 4.80 K/uL    Monos (Absolute) 0.96 (H) 0.00 - 0.85 K/uL    Eos (Absolute) 0.04 0.00 - 0.51 K/uL    Baso (Absolute) 0.04 0.00 - 0.12 K/uL    Immature Granulocytes (abs) 0.04 0.00 - 0.11 K/uL    NRBC (Absolute) 0.00 K/uL   BASIC METABOLIC PANEL    Collection Time: 10/03/20  9:55 PM   Result Value Ref Range    Sodium 134 (L) 135 - 145 mmol/L    Potassium 3.5 (L) 3.6 - 5.5 mmol/L    Chloride 99 96 - 112 mmol/L    Co2 22 20 - 33 mmol/L    Glucose 113 (H) 65 - 99 mg/dL    Bun 34 (H) 8 - 22 mg/dL    Creatinine 1.41 (H) 0.50 - 1.40 mg/dL    Calcium 8.3 (L) 8.5 - 10.5 mg/dL    Anion Gap 13.0 7.0 - 16.0   APTT    Collection Time: 10/03/20  9:55 PM   Result Value Ref Range    APTT 28.6 24.7 - 36.0 sec   PROTHROMBIN TIME (INR)    Collection Time: 10/03/20  9:55 PM   Result Value Ref Range    PT 15.2 (H) 12.0 - 14.6 sec    INR 1.16 (H) 0.87 - 1.13   ABO Rh Confirm    Collection Time: 10/03/20  9:55 PM   Result Value Ref Range    ABO Rh Confirm AB POS    ESTIMATED GFR    Collection Time: 10/03/20  9:55 PM   Result Value Ref Range    GFR If  43 (A) >60 mL/min/1.73 m 2    GFR If Non African American 35 (A) >60 mL/min/1.73 m 2   TROPONIN    Collection Time: 10/03/20   9:55 PM   Result Value Ref Range    Troponin T 35 (H) 6 - 19 ng/L   Heparin Xa (Unfractionated)    Collection Time: 10/03/20  9:55 PM   Result Value Ref Range    Heparin Xa (UFH) <0.10 IU/mL   EKG (Now)    Collection Time: 10/03/20 11:10 PM   Result Value Ref Range    Report       [facility]    Test Date:  2020-10-03  Pt Name:    AZUCENA SERRATO                  Department: ER  MRN:        [id number]                      Room:       [facility]  Gender:     Female                       Technician: [id number]  :        [id number]                   Requested By:JOHNNIE SUNSHINE  Order #:    [id number]                    Reading MD:    Measurements  Intervals                                Axis  Rate:       70                           P:          0  WI:         166                          QRS:        -69  QRSD:       117                          T:          104  QT:         486  QTc:        525    Interpretive Statements  Ventricular-paced rhythm  No further analysis attempted due to paced rhythm  Compared to ECG 2020 11:44:06  Atrial fibrillation no longer present     EC-ECHOCARDIOGRAM LTD W/O CONT    Collection Time: 10/04/20  1:22 AM   Result Value Ref Range    Eject.Frac. MOD BP 56.71     Eject.Frac. MOD 4C 38.39     Eject.Frac. MOD 2C 68.91     Left Ventrical Ejection Fraction 50    URINALYSIS CULTURE, IF INDICATED    Collection Time: 10/04/20  3:17 AM    Specimen: Urine   Result Value Ref Range    Color Yellow     Character Turbid (A)     Specific Gravity 1.020 <1.035    Ph 5.5 5.0 - 8.0    Glucose Negative Negative mg/dL    Ketones Negative Negative mg/dL    Protein 100 (A) Negative mg/dL    Bilirubin Negative Negative    Urobilinogen, Urine 0.2 Negative    Nitrite Negative Negative    Leukocyte Esterase Large (A) Negative    Occult Blood Moderate (A) Negative    Micro Urine Req Microscopic    URINE MICROSCOPIC (W/UA)    Collection Time: 10/04/20  3:17 AM   Result Value Ref  Range    -150 (A) /hpf    RBC 20-50 (A) /hpf    Bacteria Many (A) None /hpf    Epithelial Cells Negative /hpf    Hyaline Cast 3-5 (A) /lpf   CBC with Differential    Collection Time: 10/04/20  3:27 AM   Result Value Ref Range    WBC 9.7 4.8 - 10.8 K/uL    RBC 4.16 (L) 4.20 - 5.40 M/uL    Hemoglobin 9.7 (L) 12.0 - 16.0 g/dL    Hematocrit 31.8 (L) 37.0 - 47.0 %    MCV 76.4 (L) 81.4 - 97.8 fL    MCH 23.3 (L) 27.0 - 33.0 pg    MCHC 30.5 (L) 33.6 - 35.0 g/dL    RDW 52.6 (H) 35.9 - 50.0 fL    Platelet Count 147 (L) 164 - 446 K/uL    MPV 10.5 9.0 - 12.9 fL    Neutrophils-Polys 79.40 (H) 44.00 - 72.00 %    Lymphocytes 8.10 (L) 22.00 - 41.00 %    Monocytes 10.80 0.00 - 13.40 %    Eosinophils 0.70 0.00 - 6.90 %    Basophils 0.30 0.00 - 1.80 %    Immature Granulocytes 0.70 0.00 - 0.90 %    Nucleated RBC 0.00 /100 WBC    Neutrophils (Absolute) 7.66 (H) 2.00 - 7.15 K/uL    Lymphs (Absolute) 0.78 (L) 1.00 - 4.80 K/uL    Monos (Absolute) 1.04 (H) 0.00 - 0.85 K/uL    Eos (Absolute) 0.07 0.00 - 0.51 K/uL    Baso (Absolute) 0.03 0.00 - 0.12 K/uL    Immature Granulocytes (abs) 0.07 0.00 - 0.11 K/uL    NRBC (Absolute) 0.00 K/uL   Heparin Xa (Unfractionated)    Collection Time: 10/04/20  3:27 AM   Result Value Ref Range    Heparin Xa (UFH) <0.10 IU/mL   CBC WITH DIFFERENTIAL    Collection Time: 10/04/20  4:51 AM   Result Value Ref Range    WBC 9.8 4.8 - 10.8 K/uL    RBC 3.64 (L) 4.20 - 5.40 M/uL    Hemoglobin 8.5 (L) 12.0 - 16.0 g/dL    Hematocrit 27.8 (L) 37.0 - 47.0 %    MCV 76.4 (L) 81.4 - 97.8 fL    MCH 23.4 (L) 27.0 - 33.0 pg    MCHC 30.6 (L) 33.6 - 35.0 g/dL    RDW 52.6 (H) 35.9 - 50.0 fL    Platelet Count 185 164 - 446 K/uL    MPV 10.8 9.0 - 12.9 fL    Neutrophils-Polys 83.80 (H) 44.00 - 72.00 %    Lymphocytes 5.60 (L) 22.00 - 41.00 %    Monocytes 9.00 0.00 - 13.40 %    Eosinophils 0.60 0.00 - 6.90 %    Basophils 0.20 0.00 - 1.80 %    Immature Granulocytes 0.80 0.00 - 0.90 %    Nucleated RBC 0.00 /100 WBC    Neutrophils  (Absolute) 8.23 (H) 2.00 - 7.15 K/uL    Lymphs (Absolute) 0.55 (L) 1.00 - 4.80 K/uL    Monos (Absolute) 0.88 (H) 0.00 - 0.85 K/uL    Eos (Absolute) 0.06 0.00 - 0.51 K/uL    Baso (Absolute) 0.02 0.00 - 0.12 K/uL    Immature Granulocytes (abs) 0.08 0.00 - 0.11 K/uL    NRBC (Absolute) 0.00 K/uL   aPTT    Collection Time: 10/04/20  4:51 AM   Result Value Ref Range    APTT 24.9 24.7 - 36.0 sec   Prothrombin Time    Collection Time: 10/04/20  4:51 AM   Result Value Ref Range    PT 15.6 (H) 12.0 - 14.6 sec    INR 1.20 (H) 0.87 - 1.13   Heparin Xa (Unfractionated)    Collection Time: 10/04/20  4:51 AM   Result Value Ref Range    Heparin Xa (UFH) <0.10 IU/mL   CBC WITH DIFFERENTIAL    Collection Time: 10/04/20  1:17 PM   Result Value Ref Range    WBC 8.9 4.8 - 10.8 K/uL    RBC 3.66 (L) 4.20 - 5.40 M/uL    Hemoglobin 8.6 (L) 12.0 - 16.0 g/dL    Hematocrit 28.2 (L) 37.0 - 47.0 %    MCV 77.0 (L) 81.4 - 97.8 fL    MCH 23.5 (L) 27.0 - 33.0 pg    MCHC 30.5 (L) 33.6 - 35.0 g/dL    RDW 53.0 (H) 35.9 - 50.0 fL    Platelet Count 182 164 - 446 K/uL    MPV 10.7 9.0 - 12.9 fL    Neutrophils-Polys 81.20 (H) 44.00 - 72.00 %    Lymphocytes 9.10 (L) 22.00 - 41.00 %    Monocytes 9.00 0.00 - 13.40 %    Eosinophils 0.20 0.00 - 6.90 %    Basophils 0.10 0.00 - 1.80 %    Immature Granulocytes 0.40 0.00 - 0.90 %    Nucleated RBC 0.00 /100 WBC    Neutrophils (Absolute) 7.23 (H) 2.00 - 7.15 K/uL    Lymphs (Absolute) 0.81 (L) 1.00 - 4.80 K/uL    Monos (Absolute) 0.80 0.00 - 0.85 K/uL    Eos (Absolute) 0.02 0.00 - 0.51 K/uL    Baso (Absolute) 0.01 0.00 - 0.12 K/uL    Immature Granulocytes (abs) 0.04 0.00 - 0.11 K/uL    NRBC (Absolute) 0.00 K/uL   Heparin Xa (Unfractionated)    Collection Time: 10/04/20  1:17 PM   Result Value Ref Range    Heparin Xa (UFH) 0.11 IU/mL       Imaging/Procedures Review:    Chest Xray:  Reviewed    EKG:   As in HPI.     MDM (Assessment and Plan):     Active Hospital Problems    Diagnosis   • AF (atrial fibrillation) (HCC)  [I48.91]     Priority: High   • Severe mitral valve regurgitation [I34.0]   • Gastrointestinal hemorrhage with melena [K92.1]   • CKD (chronic kidney disease) [N18.9]         At this time, no further cardiac work-up while she is been evaluated by GI service.  Okay to proceed with GI procedures with moderate to high cardiovascular risk.  Overall, her cardiovascular risk is nonmodifiable.  Caution with anticoagulation therapy a short-term benefits my not supersede potential catastrophic event of occult GI bleeding.        Thank you for referring this patient to our cardiology service.  We will follow patient with you.      Karishma Ba MD.   Cardiology Inpatient Service.  [facility].  [phone].  Andreina Valdez.

## 2020-10-04 NOTE — ED NOTES
Assisted patient up to restroom and back to room via wheelchair. Patient was unable to provide urine sample at this time    Gave patient warm blanket and pillow

## 2020-10-04 NOTE — ED PROVIDER NOTES
ER Provider Note     Scribed for Darell Petty M.D. by Lorena Wilkinson. 10/3/2020, 9:56 PM.    Primary Care Provider: None  Means of Arrival: Walk-In   History obtained from: Patient  History limited by: None     CHIEF COMPLAINT  Chief Complaint   Patient presents with   • Weakness       HPI  Lindsay Landrum is a 87 y.o. female who presents to the Emergency Department for evaluation of non generalized weakness with initial onset being today. She additionally claims loss of appetite, bilious emesis, strong smelling urine, and melena occurring over the past 5 days. She has had little intake of food. Patient lives with her , who isn't able to fully provide medical help. She denies nausea and cough at this time. She has past history of GI bleed. The patient adds that she was supposed undergo open heart surgery and cauterization on Wednesday, but the patient was found to be anemic, so the procedure was canceled. Patient has prior history of A-fib, pacemaker placement, heart valve disease, and blood transfusion.     REVIEW OF SYSTEMS  Pertinent positives include non generalized weakness, loss of appetite, bilious emesis, and melena.  Pertinent negatives include: nausea and cough.   See HPI for further details. All other systems are negative. C    PAST MEDICAL HISTORY   has a past medical history of AF (atrial fibrillation) (HCC) (5/21/2012), Anemia, Bowel habit changes, Breath shortness, Cataract, Chronic anticoagulation (5/21/2012), Heart burn, Heart valve disease, Hemorrhagic disorder (HCC), High cholesterol, History of blood transfusion, Hyperlipidemia (5/21/2012), Hypothyroidism (5/21/2012), Indigestion, Pacemaker, Urinary incontinence, and Vitamin d deficiency (5/21/2012).    SURGICAL HISTORY   has a past surgical history that includes gyn surgery; other cardiac surgery; cataract extraction with iol; and abdominal hysterectomy total.    SOCIAL HISTORY  Social History     Tobacco Use   • Smoking status:  Never Smoker   • Smokeless tobacco: Never Used   Substance Use Topics   • Alcohol use: Yes     Frequency: 4 or more times a week     Drinks per session: 1 or 2   • Drug use: No      Social History     Substance and Sexual Activity   Drug Use No       FAMILY HISTORY  Family History   Problem Relation Age of Onset   • No Known Problems Mother    • Heart Disease Father    • Hypertension Father    • Lupus Sister        CURRENT MEDICATIONS    Current Facility-Administered Medications:   •  DILTIAZem CD (CARDIZEM CD) capsule 360 mg, 360 mg, Oral, QAM, Mali Laureano M.D.  •  folic acid (FOLVITE) tablet 1 mg, 1 mg, Oral, DAILY, Mali Laureano M.D.  •  levothyroxine (SYNTHROID) tablet 75 mcg, 75 mcg, Oral, QAM, Mali Laureano M.D.  •  spironolactone (ALDACTONE) tablet 25 mg, 25 mg, Oral, DAILY, Mali Laureano M.D.  •  cyanocobalamin (VITAMIN B-12) tablet 100 mcg, 100 mcg, Oral, DAILY, Mali Laureano M.D.  •  NS infusion, , Intravenous, Continuous, Mali Laureano M.D.  •  acetaminophen (TYLENOL) tablet 650 mg, 650 mg, Oral, Q6HRS PRN, Mali Laureano M.D.  •  pantoprazole (PROTONIX) injection 40 mg, 40 mg, Intravenous, BID, Mali Laureano M.D.    Current Outpatient Medications:   •  levothyroxine (SYNTHROID) 75 MCG Tab, Take 75 mcg by mouth every morning., Disp: , Rfl:   •  VITAMIN D PO, Take 1 Tab by mouth every morning., Disp: , Rfl:   •  warfarin (COUMADIN) 4 MG Tab, Take 4 mg by mouth every evening., Disp: , Rfl:   •  CALCIUM PO, Take 1 Tab by mouth every morning., Disp: , Rfl:   •  pantoprazole (PROTONIX) 40 MG Tablet Delayed Response, Take 40 mg by mouth every day., Disp: , Rfl:   •  Cyanocobalamin (VITAMIN B-12 PO), Take 1 Tab by mouth every day., Disp: , Rfl:   •  NON SPECIFIED, Take 1 Tab by mouth every 48 hours. Unknown Eye Vitamin, Disp: , Rfl:   •  MAGNESIUM PO, Take 1 Tab by mouth every day., Disp: , Rfl:   •  diltiazem (TIAZAC) 240 MG SR capsule, Take 240 mg by mouth every morning., Disp: , Rfl:   •  folic acid (FOLVITE) 1 MG  "Tab, Take 1 mg by mouth every day., Disp: , Rfl:   •  spironolactone (ALDACTONE) 25 MG Tab, Take 25 mg by mouth every day., Disp: , Rfl:   •  furosemide (LASIX) 40 MG Tab, Take 40 mg by mouth every day., Disp: , Rfl:     ALLERGIES  Allergies   Allergen Reactions   • Scallops Hives and Vomiting     hives       PHYSICAL EXAM  VITAL SIGNS: /52   Pulse 70   Temp 37.2 °C (99 °F) (Temporal)   Resp 16   Ht 1.575 m (5' 2\")   Wt 48.1 kg (106 lb)   SpO2 96%   BMI 19.39 kg/m²      Constitutional: Pale appearing. Alert in no apparent distress.  HENT: Dry mucous membranes. No signs of trauma, Bilateral external ears normal, Nose normal.   Eyes: Pupils are equal and reactive, Conjunctiva normal, Non-icteric.   Neck: Normal range of motion, No tenderness, Supple, No stridor.   Lymphatic: No lymphadenopathy noted.   Cardiovascular: Regular rate and rhythm, no palpable thrill  Thorax & Lungs: No respiratory distress,  No chest tenderness.   Abdomen: Bowel sounds normal, Soft, No tenderness, No masses, No pulsatile masses. No peritoneal signs.  Skin: Warm, Dry, No erythema, No rash.   Back: No bony tenderness, No CVA tenderness.   Extremities: Intact distal pulses, No edema, No tenderness, No cyanosis.  Musculoskeletal: Good range of motion in all major joints. No tenderness to palpation or major deformities noted.   Neurologic: Alert , Normal motor function, Normal sensory function, No focal deficits noted.   Psychiatric: Affect normal, Judgment normal, Mood normal.     DIAGNOSTIC STUDIES / PROCEDURES    EKG  Interpreted by me  Rhythm:  V-paced rhythm  Rate: 70  Axis: normal  ST Segments: no acute change  T Waves: no obvious pathologic   Q Waves: none  Clinical Impression: Normal EKG without acute changes from previous EKG performed on 9/30.      LABS  Labs Reviewed   CBC WITH DIFFERENTIAL - Abnormal; Notable for the following components:       Result Value    RBC 3.73 (*)     Hemoglobin 8.9 (*)     Hematocrit 28.7 (*)  "    MCV 76.9 (*)     MCH 23.9 (*)     MCHC 31.0 (*)     RDW 52.5 (*)     Neutrophils-Polys 79.80 (*)     Lymphocytes 8.50 (*)     Neutrophils (Absolute) 7.28 (*)     Lymphs (Absolute) 0.78 (*)     Monos (Absolute) 0.96 (*)     All other components within normal limits    Narrative:     Indicate which anticoagulants the patient is on:->UNKNOWN   BASIC METABOLIC PANEL - Abnormal; Notable for the following components:    Sodium 134 (*)     Potassium 3.5 (*)     Glucose 113 (*)     Bun 34 (*)     Creatinine 1.41 (*)     Calcium 8.3 (*)     All other components within normal limits    Narrative:     Indicate which anticoagulants the patient is on:->UNKNOWN   PROTHROMBIN TIME - Abnormal; Notable for the following components:    PT 15.2 (*)     INR 1.16 (*)     All other components within normal limits    Narrative:     Indicate which anticoagulants the patient is on:->UNKNOWN   ESTIMATED GFR - Abnormal; Notable for the following components:    GFR If  43 (*)     GFR If Non  35 (*)     All other components within normal limits    Narrative:     Indicate which anticoagulants the patient is on:->UNKNOWN   TROPONIN - Abnormal; Notable for the following components:    Troponin T 35 (*)     All other components within normal limits   APTT    Narrative:     Indicate which anticoagulants the patient is on:->UNKNOWN   COD (ADULT)   ABO RH CONFIRM   URINALYSIS,CULTURE IF INDICATED   HEPARIN XA (UNFRACTIONATED)   CBC WITH DIFFERENTIAL     All labs reviewed by me.    COURSE & MEDICAL DECISION MAKING  Pertinent Labs & Imaging studies reviewed. (See chart for details)    This is a 87 y.o. female that presents with history of mitral regurg as well as not been able to eat or drink for the past few days.  She has a history of GI bleed as well.  At this time she does have melanotic stools.  I believe this is partially related to her weakness.  I will evaluate the patient for coagulopathy.  I will evaluate her  for anemia.  I will give him some fluids given she is dehydrated appearing.  I will reassess after this..     9:56 PM - Patient seen and examined at bedside. Patient appears pale, and struggles to speak with ease. I verified that the patient was wearing a mask and I was wearing appropriate PPE every time I entered the room. The patient's mask was on the patient at all times during my encounter except for a brief view of the oropharynx.Ordered APTT, PT, COD, CBC WITH DIFFERENTIAL, BASIC METABOLIC PANEL, UA CULTURE, ABO RH, AND EKG.  Patient will be medicated with NS fluids for her symptoms.     11:36 PM- Paged hospitalist.     11:41 PM I discussed the patient's case and the above findings with Dr. Laureano (hospitalist) who agrees to hospitalize the patient and take over plan of care moving forward.    HYDRATION: Based on the patient's presentation of Dehydration the patient was given IV fluids. IV Hydration was used because oral hydration was not adequate alone. Upon recheck following hydration, the patient was mildly improved.    Patient was found to have worsening anemia.  She has no leukocytosis.  Her INR is diminished given she has not been able to take her Coumadin.  We will admit her to the hospitalist in guarded condition.  Creatinine is elevated at 1.41 however this is around baseline for her.  Troponin is mildly elevated at 35 however she has a nonischemic EKG.  She will be admitted to the hospitalist in guarded condition.    DISPOSITION:  Patient will be hospitalized by Dr. Laureano in guarded condition.    FINAL IMPRESSION  1. Gastrointestinal hemorrhage, unspecified gastrointestinal hemorrhage type          I, Lorena Barajas), am scribing for, and in the presence of, Darell Petty M.D..    Electronically signed by: Lorena Barajas), 10/3/2020    IDarell M.D. personally performed the services described in this documentation, as scribed by Lorena Wilkinson in my presence, and it is  both accurate and complete.     The note accurately reflects work and decisions made by me.  Darell Petty M.D.  10/4/2020  2:48 AM

## 2020-10-04 NOTE — H&P
Hospital Medicine History & Physical Note    Date of Service  10/4/2020    Primary Care Physician  Pcp Pt States None    Consultants  Gastro  Cardio - Dr. Zamorano    Code Status  DNAR/DNI    Chief Complaint  Chief Complaint   Patient presents with   • Weakness       History of Presenting Illness  87 y.o. female who presented 10/3/2020 with fatigue for the past week. She states that her fatigue is worsening and that she can barely walk to the bathroom at this time. She lives with her partner who cannot take care of her and currently her daughter is present to help her with her needs. Patient has been fatigued that she has not had anything to eat in the past 5 days. She also stopped taking all of her medications including her AC and has taken iron pills in the past 2 days. She notes that her stool this morning was very dark and came to the ED as she had history of GIB 2 years ago that required transfusion and endoscopic workup. Patient on exam cannot pinpoint her symptoms except that she is always tired.     Patient has extensive cardiac history where last ECHO showed severe mitral valve regurgitation despite mitral valve clipping with EF of 50%. Patient also has persistent atrial fibrillation and has been on Coumadin. However, she has not taken her AC and currently has normal INR. Patient states that she is currently in the process of obtaining cardia cath to go to [facility] for open heart surgery to fix her mitral valve. However, her cath has been delayed due to infection (unknown where) and anemia.     Review of Systems  Review of Systems   Constitutional: Positive for malaise/fatigue. Negative for chills and fever.   HENT: Negative.    Eyes: Negative.    Respiratory: Negative.    Cardiovascular: Negative.    Gastrointestinal: Positive for melena. Negative for nausea and vomiting.   Genitourinary: Negative.    Musculoskeletal: Positive for myalgias.   Skin: Negative.    Neurological: Positive for weakness.    Psychiatric/Behavioral: Negative.        Past Medical History   has a past medical history of AF (atrial fibrillation) (HCC) (5/21/2012), Anemia, Bowel habit changes, Breath shortness, Cataract, Chronic anticoagulation (5/21/2012), Heart burn, Heart valve disease, Hemorrhagic disorder (HCC), High cholesterol, History of blood transfusion, Hyperlipidemia (5/21/2012), Hypothyroidism (5/21/2012), Indigestion, Pacemaker, Urinary incontinence, and Vitamin d deficiency (5/21/2012).    Surgical History   has a past surgical history that includes gyn surgery; other cardiac surgery; cataract extraction with iol; and abdominal hysterectomy total.     Family History  family history includes Heart Disease in her father; Hypertension in her father; Lupus in her sister; No Known Problems in her mother.     Social History   reports that she has never smoked. She has never used smokeless tobacco. She reports current alcohol use. She reports that she does not use drugs.    Allergies  Allergies   Allergen Reactions   • Scallops Hives and Vomiting     hives       Medications  Prior to Admission Medications   Prescriptions Last Dose Informant Patient Reported? Taking?   CALCIUM PO 9/28/2020 at [facility] Patient Yes Yes   Sig: Take 1 Tab by mouth every morning.   Cyanocobalamin (VITAMIN B-12 PO) 9/28/2020 at [facility] Patient Yes Yes   Sig: Take 1 Tab by mouth every day.   MAGNESIUM PO 9/28/2020 at [facility] Patient Yes No   Sig: Take 1 Tab by mouth every day.   NON SPECIFIED 9/28/2020 at [facility] Patient Yes Yes   Sig: Take 1 Tab by mouth every 48 hours. Unknown Eye Vitamin   VITAMIN D PO 9/28/2020 at [facility] Patient Yes Yes   Sig: Take 1 Tab by mouth every morning.   diltiazem (TIAZAC) 240 MG SR capsule 9/28/2020 at [facility] Patient Yes No   Sig: Take 240 mg by mouth every morning.   folic acid (FOLVITE) 1 MG Tab 9/28/2020 at [facility] Patient Yes No   Sig: Take 1 mg by mouth every day.   furosemide (LASIX) 40 MG Tab 9/28/2020 at [facility] Patient Yes No   Sig: Take 40 mg by  mouth every day.   levothyroxine (SYNTHROID) 75 MCG Tab 9/28/2020 at [facility] Patient Yes Yes   Sig: Take 75 mcg by mouth every morning.   pantoprazole (PROTONIX) 40 MG Tablet Delayed Response 9/28/2020 at [facility] Patient Yes Yes   Sig: Take 40 mg by mouth every day.   spironolactone (ALDACTONE) 25 MG Tab 9/28/2020 at [facility] Patient Yes No   Sig: Take 25 mg by mouth every day.   warfarin (COUMADIN) 4 MG Tab 9/28/2020 at [facility] Patient Yes Yes   Sig: Take 4 mg by mouth every evening.      Facility-Administered Medications: None       Physical Exam  Temp:  [37.2 °C (99 °F)] 37.2 °C (99 °F)  Pulse:  [70] 70  Resp:  [16] 16  BP: (123)/(52) 123/52  SpO2:  [96 %] 96 %    Physical Exam  Vitals signs and nursing note reviewed.   Constitutional:       General: She is not in acute distress.     Appearance: Normal appearance. She is normal weight. She is not ill-appearing.   HENT:      Head: Normocephalic and atraumatic.      Mouth/Throat:      Mouth: Mucous membranes are moist.   Eyes:      General: No scleral icterus.     Extraocular Movements: Extraocular movements intact.      Conjunctiva/sclera: Conjunctivae normal.      Pupils: Pupils are equal, round, and reactive to light.   Neck:      Musculoskeletal: Normal range of motion and neck supple. No neck rigidity or muscular tenderness.      Vascular: No carotid bruit.   Cardiovascular:      Rate and Rhythm: Normal rate and regular rhythm.      Pulses: Normal pulses.      Heart sounds: Murmur present.   Pulmonary:      Effort: Pulmonary effort is normal.      Breath sounds: Normal breath sounds. No rhonchi.   Abdominal:      General: Abdomen is flat. Bowel sounds are normal. There is no distension.      Palpations: Abdomen is soft.      Tenderness: There is no abdominal tenderness. There is no guarding.   Genitourinary:     Rectum: Guaiac result positive.   Musculoskeletal: Normal range of motion.         General: No swelling or tenderness.   Skin:     General: Skin is warm and dry.       Coloration: Skin is not jaundiced or pale.   Neurological:      General: No focal deficit present.      Mental Status: She is alert and oriented to person, place, and time. Mental status is at baseline.      Cranial Nerves: No cranial nerve deficit.      Motor: Weakness present.   Psychiatric:         Mood and Affect: Mood normal.         Behavior: Behavior normal.         Thought Content: Thought content normal.         Judgment: Judgment normal.         Laboratory:  Recent Labs     10/03/20  2155   WBC 9.1   RBC 3.73*   HEMOGLOBIN 8.9*   HEMATOCRIT 28.7*   MCV 76.9*   MCH 23.9*   MCHC 31.0*   RDW 52.5*   PLATELETCT 192   MPV 10.6     Recent Labs     10/03/20  2155   SODIUM 134*   POTASSIUM 3.5*   CHLORIDE 99   CO2 22   GLUCOSE 113*   BUN 34*   CREATININE 1.41*   CALCIUM 8.3*     Recent Labs     10/03/20  2155   GLUCOSE 113*     Recent Labs     10/03/20  2155   APTT 28.6   INR 1.16*     No results for input(s): NTPROBNP in the last 72 hours.      No results for input(s): TROPONINT in the last 72 hours.    Imaging:  EC-ECHOCARDIOGRAM LTD W/O CONT    (Results Pending)         Assessment/Plan:  I anticipate this patient will require at least two midnights for appropriate medical management, necessitating inpatient admission.    * Gastrointestinal hemorrhage with melena- (present on admission)  Assessment & Plan  -Vitals are stable at this time and slight decrease in H/H  -Patient is taking iron pill and we will need occult blood x3  -Will start patient on Protonix 40mg BID  -GI consult in AM  -With BUN on downtrend from previous lab, patient's symptoms may be related to her cardiac status. For this reason, patient, her daughter, and I have discussed benefits and risk of heparin drip and we have decided that benefit of heparin drip at this time outweighs the risk  -CBC will be checked q6 hours  -Monitor BP and for tachycardia  -Will be on tele monitor     AF (atrial fibrillation) (HCC)- (present on  admission)  Assessment & Plan  -Has not taken coumadin in the past 5 days  -Will be on heparin drip, will stop 6 hours prior to any intervention planned   -Continue home med diltiazem for rate control, BP parameters in place    Severe mitral valve regurgitation- (present on admission)  Assessment & Plan  -Last ECHO in August shows severe mitral regurgitation despite mitral valve clip   -Will repeat ECHO limited study   -Cardiology consult  -Patient has Afib with mitral valve pathology and has not been taking coumadin for the past 5 days  -Will start heparin drip at this time and place patient on telemetry for close monitoring  -CBC q6 hours and to monitor BP  -Type and screen done, protamine sulfate will be given if patient's GIB worsens    CKD (chronic kidney disease)- (present on admission)  Assessment & Plan  -Chronic, no active issue  -Will avoid nephrotoxic agent at this time

## 2020-10-04 NOTE — CONSULTS
Date of service: 10/4/2020    Attending Physician: Emerson Sal M.D.    History of Present Illness: Lindsay Landrum is a 87 y.o. female here for anemia and iron deficiency and possibly recent GI bleed.    Asked by primary team and cardio to consult for above.    Patient mainly weak and found to be anemic. That was the reason cardiac eval and presumed catheterization was cancelled last week. She has had endoscopic procedures in [facility], and in [place] but does not remember details.    She denies abdominal complaints.    She took iron and had some darker stools one day last week but nothing else.    She has been on coumading for afib but stopped all medication a few days ago.  She is microcytic.     Review of Systems:   Review of Systems   Constitutional: Positive for malaise/fatigue and weight loss.   HENT: Negative.    Eyes: Negative.    Respiratory: Negative.    Cardiovascular: Positive for palpitations.   Gastrointestinal: Negative.    Genitourinary: Negative.    Musculoskeletal: Negative.    Skin: Negative.    Neurological: Negative.    Endo/Heme/Allergies:        Anemia and JIN   Psychiatric/Behavioral: Negative.        Current Facility-Administered Medications   Medication Dose Route Frequency Provider Last Rate Last Dose   • folic acid (FOLVITE) tablet 1 mg  1 mg Oral DAILY Mali Laureano M.D.   1 mg at 10/04/20 0541   • levothyroxine (SYNTHROID) tablet 75 mcg  75 mcg Oral QAM Mali Laureano M.D.   75 mcg at 10/04/20 0600   • spironolactone (ALDACTONE) tablet 25 mg  25 mg Oral DAILY Mali Laureano M.D.   25 mg at 10/04/20 0541   • cyanocobalamin (VITAMIN B-12) tablet 100 mcg  100 mcg Oral DAILY Mali Laureano M.D.   100 mcg at 10/04/20 0541   • NS infusion   Intravenous Continuous Mali Laureano M.D. 83 mL/hr at 10/04/20 0357     • acetaminophen (TYLENOL) tablet 650 mg  650 mg Oral Q6HRS PRN Mali Laureano M.D.       • pantoprazole (PROTONIX) injection 40 mg  40 mg Intravenous BID Mali Laureano M.D.   40 mg at  10/04/20 0629   • DILTIAZem CD (CARDIZEM CD) capsule 240 mg  240 mg Oral Q DAY Mali Laureano M.D.   240 mg at 10/04/20 0540   • heparin infusion 25,000 units in 500 mL 0.45% NACL  0-30 Units/kg/hr Intravenous Continuous Mali Laureano M.D. 11.8 mL/hr at 10/04/20 0721 12 Units/kg/hr at 10/04/20 0721   • heparin injection 1,500 Units  30 Units/kg Intravenous PRN Mali Laureano M.D.   1,500 Units at 10/04/20 0542   • polyethylene glycol-electrolytes (GOLYTELY) solution 4 L  4 L Oral Once Colton Santana M.D.           Social History     Tobacco Use   • Smoking status: Never Smoker   • Smokeless tobacco: Never Used   Substance Use Topics   • Alcohol use: Yes     Frequency: 4 or more times a week     Drinks per session: 1 or 2   • Drug use: No        Past Medical History:   Diagnosis Date   • AF (atrial fibrillation) (HCC) 5/21/2012   • Anemia    • Bowel habit changes     takes miralax   • Breath shortness     with a fib   • Cataract     surgery   • Chronic anticoagulation 5/21/2012   • Heart burn    • Heart valve disease    • Hemorrhagic disorder (HCC)     transfusions x2   • High cholesterol    • History of blood transfusion    • Hyperlipidemia 5/21/2012   • Hypothyroidism 5/21/2012   • Indigestion    • Pacemaker    • Urinary incontinence     after procedure   • Vitamin d deficiency 5/21/2012       Past Surgical History:   Procedure Laterality Date   • ABDOMINAL HYSTERECTOMY TOTAL     • CATARACT EXTRACTION WITH IOL     • GYN SURGERY      hysterectomy   • OTHER CARDIAC SURGERY      pacemaker       Allergies: Scallops    Family History   Problem Relation Age of Onset   • No Known Problems Mother    • Heart Disease Father    • Hypertension Father    • Lupus Sister        Vitals:    10/04/20 1201   BP: (!) 98/60   Pulse: 70   Resp: 18   Temp: 36.6 °C (97.8 °F)   SpO2: 91%        Physical Examination:     Physical Exam   Constitutional: She is oriented to person, place, and time and well-developed, well-nourished, and in no  distress. No distress.   HENT:   Head: Normocephalic and atraumatic.   Eyes: No scleral icterus.   Neck: No tracheal deviation present.   Pulmonary/Chest: Effort normal. No stridor. No respiratory distress.   Abdominal: Soft. She exhibits no distension and no mass. There is no abdominal tenderness. There is no rebound and no guarding.   Musculoskeletal: Normal range of motion.   Neurological: She is alert and oriented to person, place, and time.   Skin: Skin is warm and dry.   Psychiatric: Mood, affect and judgment normal.         Lab Results   Component Value Date/Time    PROTHROMBTM 15.6 (H) 10/04/2020 04:51 AM    INR 1.20 (H) 10/04/2020 04:51 AM      Lab Results   Component Value Date/Time    WBC 9.8 10/04/2020 04:51 AM    RBC 3.64 (L) 10/04/2020 04:51 AM    HEMOGLOBIN 8.5 (L) 10/04/2020 04:51 AM    HEMATOCRIT 27.8 (L) 10/04/2020 04:51 AM    MCV 76.4 (L) 10/04/2020 04:51 AM    MCH 23.4 (L) 10/04/2020 04:51 AM    MCHC 30.6 (L) 10/04/2020 04:51 AM    MPV 10.8 10/04/2020 04:51 AM    NEUTSPOLYS 83.80 (H) 10/04/2020 04:51 AM    LYMPHOCYTES 5.60 (L) 10/04/2020 04:51 AM    MONOCYTES 9.00 10/04/2020 04:51 AM    EOSINOPHILS 0.60 10/04/2020 04:51 AM    BASOPHILS 0.20 10/04/2020 04:51 AM      Lab Results   Component Value Date/Time    SODIUM 134 (L) 10/03/2020 09:55 PM    POTASSIUM 3.5 (L) 10/03/2020 09:55 PM    CHLORIDE 99 10/03/2020 09:55 PM    CO2 22 10/03/2020 09:55 PM    GLUCOSE 113 (H) 10/03/2020 09:55 PM    BUN 34 (H) 10/03/2020 09:55 PM    CREATININE 1.41 (H) 10/03/2020 09:55 PM    BUNCREATRAT 18 07/27/2020 10:33 AM      Recent Labs     10/03/20  2155 10/04/20  0451   INR 1.16* 1.20*       Imaging:   Ct-chest (thorax) W/o    Result Date: 9/22/2020 9/22/2020 4:01 PM HISTORY/REASON FOR EXAM: Preoperative evaluation prior to repair of esophageal stricture. Esophagitis. Mitral regurgitation. TECHNIQUE/EXAM DESCRIPTION: CT scan of the chest without contrast. Thin-section helical images were obtained from the lung apices  through the adrenal glands. Low dose optimization technique was utilized for this CT exam including automated exposure control and adjustment of the mA and/or kV according to patient size. COMPARISON: None FINDINGS: The study is limited due to non-use of intravenous contrast. The mediastinum and hilum is not well evaluated. There is biapical pulmonary scarring. There is a 6 x 2 mm pleural-based nodule within the right lower lobe. There is a small right pleural effusion. There are multiple borderline enlarged mediastinal lymph nodes which measure up to 10 mm in short axis dimension. There is ectasia of the ascending aorta measured at 3.1 x 3.1 cm. There is mitral annular calcification. The heart is enlarged with prominent right and left atrial enlargement.. There is atherosclerotic vascular calcification.     1.  Small right pleural effusion with bibasilar atelectasis. 2.  Linear scarring within the lung apices bilaterally. 3.  Small pleural-based nodule within the right lower lobe posteriorly measuring 6 x 2 mm in size. 4.  Cardiomegaly with prominent enlargement of the atria. 5.  Ectasia of the ascending aorta. 6.  Borderline enlarged mediastinal lymph nodes. Fleischner Society pulmonary nodule recommendations: Low Risk: No routine follow-up High Risk: Optional CT at 12 months Comments: Nodules less than 6 mm do not require routine follow-up, but certain patients at high risk with suspicious nodule morphology, upper lobe location, or both may warrant 12-month follow-up. Low Risk - Minimal or absent history of smoking and of other known risk factors. High Risk - History of smoking or of other known risk factors. Note: These recommendations do not apply to lung cancer screening, patients with immunosuppression, or patients with known primary cancer. Fleischner Society 2017 Guidelines for Management of Incidentally Detected Pulmonary Nodules in Adults     Us-carotid Doppler Bilat    Result Date: 9/23/2020 9/22/2020  3:29 PM HISTORY/REASON FOR EXAM:  Preoperative cardiovascular exam TECHNIQUE/EXAM DESCRIPTION:  Bilateral extracranial arterial complete Doppler ultrasound. Real-time gray-scale, color and duplex Doppler sonography of the cervical carotid and vertebral arteries was performed. Estimation of internal carotid artery diameter stenosis corresponds to NASCET criteria measurements for diameter stenosis wherein a segment of uniform caliber distal cervical internal carotid is used as the reference denominator. COMPARISON:  None FINDINGS: REAL-TIME GRAY-SCALE IMAGING: Real-time gray-scale imaging reveals no evidence of arterial dissection involving the carotid, subclavian, or vertebral arteries. No aneurysm or pseudo-aneurysm is identified. COLOR AND DUPLEX DOPPLER IMAGING: RIGHT: Peak systolic velocities in cm/s are as follows: Common Carotid  51.7 cm/s Proximal Internal Carotid 69.4 cm/s Mid Internal Carotid 94.9 cm/s Distal Internal Carotid 107.6 cm/s ICA end diastolic velocity  34.7 cm/s Vertebral 39.7 cm/s with antegrade flow Subclavian 99.2 cm/s ICA/CCA ratio 2.08 LEFT: Peak systolic velocities in cm/s are as follows: Common Carotid  65.6 cm/s Proximal Internal Carotid 52.3 cm/s Mid Internal Carotid 69.0 cm/s Distal Internal Carotid 100.5 cm/s ICA end diastolic velocity 34.4 cm/s Vertebral 52.6 cm/s with antegrade flow Subclavian 83.3 cm/s ICA/CCA ratio 1.53     1.  There is a moderate amount of atherosclerotic plaque. Plaque is located in carotid bulbs and proximal internal carotid arteries, right more than left. Plaque characterization:  Heterogeneous, calcific and irregular 2.  Diameter reduction in the internal carotid arteries: less than 50%. There is no hemodynamically significant stenosis. There is no evidence of carotid occlusion. 3. Vertebral arteries demonstrate antegrade flow. INTERPRETING LOCATION: [address], [address]    Ec-echocardiogram Ltd W/o Cont    Result Date: 10/4/2020  Transthoracic Echo  Report Echocardiography Laboratory CONCLUSIONS Compared to the report of the study done - 2020 there has been no change. Mildly reduced left ventricular systolic function. Left ventricular ejection fraction is visually estimated to be 45%-50%. Known transcatheter mitral valve repair which is functioning normally with appropriate transvalvular gradient. Mean transvalvular gradient is  4.0 mmHg at a heart rate of 66 BPM. Severe mitral regurgitation. AZUCENA SERRATO Exam Date:         10/04/2020                    00:40 Exam Location:     Inpatient Priority:          Routine Ordering Physician:        PA DAVIES Referring Physician:       [id number] Singh Sonographer:               Luis Nichole TRICIA Age:    87     Gender:    F MRN:    [id number] :    [id number] BSA:    1.46   Ht (in):    62     Wt (lb):    106 Exam Type:     Complete Indications:     Atrial Fibrillation, Mitral regurgitation ICD Codes:       427.31  424.0 CPT Codes:       14409 BP:   123    /   52     HR:   70 Technical Quality:       Fair MEASUREMENTS  (Male / Female) Normal Values 2D ECHO LV Diastolic Diameter PLAX        4.3 cm                4.2 - 5.9 / 3.9 - 5.3 cm LV Systolic Diameter PLAX         2.8 cm                2.1 - 4.0 cm IVS Diastolic Thickness           1.2 cm                LVPW Diastolic Thickness          0.91 cm               Estimated LV Ejection Fraction    50 %                  LV Ejection Fraction MOD BP       56.7 %                >= 55  % LV Ejection Fraction MOD 4C       38.4 %                LV Ejection Fraction MOD 2C       68.9 %                DOPPLER MV Velocity Time Integral         44.2 cm               * Indicates values subject to auto-interpretation LV EF:  50    % FINDINGS Left Ventricle Normal left ventricular chamber size. Mild concentric left ventricular hypertrophy. Mildly reduced left ventricular systolic function. Left ventricular ejection fraction is visually estimated to be 45%-50%. Abnormal septal  motion consistent with ventricular pacing. Right Ventricle Right Atrium Left Atrium Mitral Valve Known transcatheter mitral valve repair which is functioning normally with appropriate transvalvular gradient. Mean transvalvular gradient is  4.0 mmHg at a heart rate of 66 BPM. Severe mitral regurgitation. Aortic Valve Tricuspid Valve Pulmonic Valve Pericardium Aorta Karishma HURTADO To (Electronically Signed) Final Date:     04 October 2020                 10:59            Assessment and Plan:    JIN  ? Melena vs iron   HGB ~ 8  MCV 76  Need for cardiac cath in anticipation of mitral valve repair.      PLAN  EGD and colonoscopy  Patient familiar with procedures and risks.

## 2020-10-04 NOTE — ASSESSMENT & PLAN NOTE
Hemoglobin remained stable  S/p EGD/Colonoscopy, results in GI note  -D/C protonix. Start famotidine given CKD.  -Continue telemetry  -Holding heparin for bleeding risk  -check AM CBC

## 2020-10-04 NOTE — ED TRIAGE NOTES
"Pt into triage in a wheelchair c.o increase in fatigue over the last week.  Pt states \"I am bleeding to death internally\".  Pt had CBC done 9/30 that was 9.4.  Pt recent;ly started on iron vitals and has had an increase in dark stools, denies any notable blood in the stool, denies n/v.  Pt has had a decrease in appetite over the last month, \"I only want to eat ice\".  Seen by ANGELA KOCH on thurs and was tile she was anemic and had a :\"slight infection\".  Pt states \"I am aiming for open heart sx at [place]\"   "

## 2020-10-04 NOTE — ASSESSMENT & PLAN NOTE
-Last ECHO in August shows severe mitral regurgitation despite mitral valve clip   -Type and screen done, protamine sulfate will be given if patient's GIB worsens  -patient to obtain PCP referral to Cardiology after moving.

## 2020-10-04 NOTE — ASSESSMENT & PLAN NOTE
Telemetry showing pacemaker and atrial flutter.  -Has not taken coumadin in the past 5 days  -Holding heparin drip, post-polypectomy high risk of bleeding.  -Continue home med diltiazem for rate control, BP parameters in place  -Anticoagulation to be restarted by PCP in [facility]

## 2020-10-04 NOTE — PROGRESS NOTES
Spoke with the charge nurse about concerns with initiating a heparin drip while a hgb is dropping. Charge nurse spoke with MD Laureano. Determined that benefits outweigh the risk and MD still wants a heparin drip initiated.

## 2020-10-04 NOTE — PROGRESS NOTES
Bedside report received and care assumed at start of shift.    Pt is A&Ox4 and on  room air. No reports of pain. VS stable. Tele monitor in place.    Pt is resting in bed. Call light is in reach and bed is locked in its lowest position. Hourly rounding in place. Assessment complete and all needs are attended to.

## 2020-10-04 NOTE — ED NOTES
Pharmacy Medication Reconciliation    Medication reconciliation has been completed by interviewing patient with medication list and consent to do so with family/visitors in the room. Reviewed medication list with pt and returned to pt family at bedside  Allergies were reviewed and updated   No ORAL antibiotics within the past 14 days  Pt home pharmacy:[facility]

## 2020-10-04 NOTE — PROGRESS NOTES
Received report and assumed care of patient. Pt transported up by the flex nurse on the Zoll monitor. Patient is A & O x 4 and awake in bed. Patient is in no signs of distress and denies pain at this time. Patient was updated on the plan of care for the day. Call light within reach, bed in low position, 2 side rails up. All fall precautions in place. Tele box is on and cardiac rhythm is being monitored. Will continue to monitor

## 2020-10-04 NOTE — PROGRESS NOTES
2 RN Skin Check    2 RN skin check complete.   Devices in place: N/A.  Skin assessed under devices: N\A.  Confirmed pressure ulcers found on: N/A.  New potential pressure ulcers noted on ears and coccyx. Wound consult placed No.  The following interventions in place Pillows.    Ears and coccyx red but blanching.

## 2020-10-05 ENCOUNTER — ANESTHESIA EVENT (OUTPATIENT)
Dept: SURGERY | Facility: MEDICAL CENTER | Age: 85
DRG: 378 | End: 2020-10-05
Payer: MEDICARE

## 2020-10-05 ENCOUNTER — ANESTHESIA (OUTPATIENT)
Dept: SURGERY | Facility: MEDICAL CENTER | Age: 85
DRG: 378 | End: 2020-10-05
Payer: MEDICARE

## 2020-10-05 PROBLEM — D50.8 IRON DEFICIENCY ANEMIA SECONDARY TO INADEQUATE DIETARY IRON INTAKE: Status: ACTIVE | Noted: 2020-10-05

## 2020-10-05 PROBLEM — E46 MALNUTRITION (HCC): Status: ACTIVE | Noted: 2020-10-05

## 2020-10-05 LAB
ANION GAP SERPL CALC-SCNC: 15 MMOL/L (ref 7–16)
BASOPHILS # BLD AUTO: 0.2 % (ref 0–1.8)
BASOPHILS # BLD AUTO: 0.4 % (ref 0–1.8)
BASOPHILS # BLD: 0.02 K/UL (ref 0–0.12)
BASOPHILS # BLD: 0.05 K/UL (ref 0–0.12)
BUN SERPL-MCNC: 34 MG/DL (ref 8–22)
CALCIUM SERPL-MCNC: 7.9 MG/DL (ref 8.5–10.5)
CHLORIDE SERPL-SCNC: 99 MMOL/L (ref 96–112)
CO2 SERPL-SCNC: 20 MMOL/L (ref 20–33)
CREAT SERPL-MCNC: 1.46 MG/DL (ref 0.5–1.4)
EOSINOPHIL # BLD AUTO: 0 K/UL (ref 0–0.51)
EOSINOPHIL # BLD AUTO: 0.03 K/UL (ref 0–0.51)
EOSINOPHIL NFR BLD: 0 % (ref 0–6.9)
EOSINOPHIL NFR BLD: 0.3 % (ref 0–6.9)
ERYTHROCYTE [DISTWIDTH] IN BLOOD BY AUTOMATED COUNT: 52.7 FL (ref 35.9–50)
ERYTHROCYTE [DISTWIDTH] IN BLOOD BY AUTOMATED COUNT: 55.1 FL (ref 35.9–50)
FERRITIN SERPL-MCNC: 70.1 NG/ML (ref 10–291)
GLUCOSE SERPL-MCNC: 98 MG/DL (ref 65–99)
HCT VFR BLD AUTO: 24.2 % (ref 37–47)
HCT VFR BLD AUTO: 29.9 % (ref 37–47)
HGB BLD-MCNC: 7.3 G/DL (ref 12–16)
HGB BLD-MCNC: 9 G/DL (ref 12–16)
IMM GRANULOCYTES # BLD AUTO: 0.07 K/UL (ref 0–0.11)
IMM GRANULOCYTES # BLD AUTO: 0.1 K/UL (ref 0–0.11)
IMM GRANULOCYTES NFR BLD AUTO: 0.7 % (ref 0–0.9)
IMM GRANULOCYTES NFR BLD AUTO: 0.8 % (ref 0–0.9)
IRON SATN MFR SERPL: 5 % (ref 15–55)
IRON SERPL-MCNC: 15 UG/DL (ref 40–170)
LYMPHOCYTES # BLD AUTO: 0.55 K/UL (ref 1–4.8)
LYMPHOCYTES # BLD AUTO: 0.87 K/UL (ref 1–4.8)
LYMPHOCYTES NFR BLD: 4.3 % (ref 22–41)
LYMPHOCYTES NFR BLD: 9.2 % (ref 22–41)
MCH RBC QN AUTO: 23 PG (ref 27–33)
MCH RBC QN AUTO: 23.6 PG (ref 27–33)
MCHC RBC AUTO-ENTMCNC: 30.1 G/DL (ref 33.6–35)
MCHC RBC AUTO-ENTMCNC: 30.2 G/DL (ref 33.6–35)
MCV RBC AUTO: 76.3 FL (ref 81.4–97.8)
MCV RBC AUTO: 78.5 FL (ref 81.4–97.8)
MONOCYTES # BLD AUTO: 0.24 K/UL (ref 0–0.85)
MONOCYTES # BLD AUTO: 0.78 K/UL (ref 0–0.85)
MONOCYTES NFR BLD AUTO: 1.9 % (ref 0–13.4)
MONOCYTES NFR BLD AUTO: 8.2 % (ref 0–13.4)
NEUTROPHILS # BLD AUTO: 11.77 K/UL (ref 2–7.15)
NEUTROPHILS # BLD AUTO: 7.69 K/UL (ref 2–7.15)
NEUTROPHILS NFR BLD: 81.4 % (ref 44–72)
NEUTROPHILS NFR BLD: 92.6 % (ref 44–72)
NRBC # BLD AUTO: 0 K/UL
NRBC # BLD AUTO: 0 K/UL
NRBC BLD-RTO: 0 /100 WBC
NRBC BLD-RTO: 0 /100 WBC
PATHOLOGY CONSULT NOTE: NORMAL
PLATELET # BLD AUTO: 192 K/UL (ref 164–446)
PLATELET # BLD AUTO: 214 K/UL (ref 164–446)
PMV BLD AUTO: 10.7 FL (ref 9–12.9)
PMV BLD AUTO: 11.3 FL (ref 9–12.9)
POTASSIUM SERPL-SCNC: 3.5 MMOL/L (ref 3.6–5.5)
RBC # BLD AUTO: 3.17 M/UL (ref 4.2–5.4)
RBC # BLD AUTO: 3.81 M/UL (ref 4.2–5.4)
SODIUM SERPL-SCNC: 134 MMOL/L (ref 135–145)
TIBC SERPL-MCNC: 282 UG/DL (ref 250–450)
UFH PPP CHRO-ACNC: <0.1 IU/ML
UIBC SERPL-MCNC: 267 UG/DL (ref 110–370)
WBC # BLD AUTO: 12.7 K/UL (ref 4.8–10.8)
WBC # BLD AUTO: 9.5 K/UL (ref 4.8–10.8)

## 2020-10-05 PROCEDURE — 160048 HCHG OR STATISTICAL LEVEL 1-5: Performed by: INTERNAL MEDICINE

## 2020-10-05 PROCEDURE — 0DBH8ZZ EXCISION OF CECUM, VIA NATURAL OR ARTIFICIAL OPENING ENDOSCOPIC: ICD-10-PCS | Performed by: INTERNAL MEDICINE

## 2020-10-05 PROCEDURE — 160002 HCHG RECOVERY MINUTES (STAT): Performed by: INTERNAL MEDICINE

## 2020-10-05 PROCEDURE — 502240 HCHG MISC OR SUPPLY RC 0272: Performed by: INTERNAL MEDICINE

## 2020-10-05 PROCEDURE — 80048 BASIC METABOLIC PNL TOTAL CA: CPT

## 2020-10-05 PROCEDURE — 700105 HCHG RX REV CODE 258: Performed by: INTERNAL MEDICINE

## 2020-10-05 PROCEDURE — 160208 HCHG ENDO MINUTES - EA ADDL 1 MIN LEVEL 4: Performed by: INTERNAL MEDICINE

## 2020-10-05 PROCEDURE — 99233 SBSQ HOSP IP/OBS HIGH 50: CPT | Performed by: INTERNAL MEDICINE

## 2020-10-05 PROCEDURE — 85520 HEPARIN ASSAY: CPT

## 2020-10-05 PROCEDURE — 700101 HCHG RX REV CODE 250

## 2020-10-05 PROCEDURE — 700111 HCHG RX REV CODE 636 W/ 250 OVERRIDE (IP): Performed by: INTERNAL MEDICINE

## 2020-10-05 PROCEDURE — 83550 IRON BINDING TEST: CPT

## 2020-10-05 PROCEDURE — 88305 TISSUE EXAM BY PATHOLOGIST: CPT

## 2020-10-05 PROCEDURE — 770020 HCHG ROOM/CARE - TELE (206)

## 2020-10-05 PROCEDURE — 82728 ASSAY OF FERRITIN: CPT

## 2020-10-05 PROCEDURE — 700102 HCHG RX REV CODE 250 W/ 637 OVERRIDE(OP): Performed by: INTERNAL MEDICINE

## 2020-10-05 PROCEDURE — 160009 HCHG ANES TIME/MIN: Performed by: INTERNAL MEDICINE

## 2020-10-05 PROCEDURE — A9270 NON-COVERED ITEM OR SERVICE: HCPCS | Performed by: INTERNAL MEDICINE

## 2020-10-05 PROCEDURE — C9113 INJ PANTOPRAZOLE SODIUM, VIA: HCPCS | Performed by: INTERNAL MEDICINE

## 2020-10-05 PROCEDURE — 700105 HCHG RX REV CODE 258

## 2020-10-05 PROCEDURE — 85025 COMPLETE CBC W/AUTO DIFF WBC: CPT

## 2020-10-05 PROCEDURE — 503081 HCHG INK, SPOT LF (ENDO): Performed by: INTERNAL MEDICINE

## 2020-10-05 PROCEDURE — 160035 HCHG PACU - 1ST 60 MINS PHASE I: Performed by: INTERNAL MEDICINE

## 2020-10-05 PROCEDURE — 0DJ08ZZ INSPECTION OF UPPER INTESTINAL TRACT, VIA NATURAL OR ARTIFICIAL OPENING ENDOSCOPIC: ICD-10-PCS | Performed by: INTERNAL MEDICINE

## 2020-10-05 PROCEDURE — 99232 SBSQ HOSP IP/OBS MODERATE 35: CPT | Performed by: INTERNAL MEDICINE

## 2020-10-05 PROCEDURE — 36415 COLL VENOUS BLD VENIPUNCTURE: CPT

## 2020-10-05 PROCEDURE — 700111 HCHG RX REV CODE 636 W/ 250 OVERRIDE (IP)

## 2020-10-05 PROCEDURE — 83540 ASSAY OF IRON: CPT

## 2020-10-05 PROCEDURE — 160203 HCHG ENDO MINUTES - 1ST 30 MINS LEVEL 4: Performed by: INTERNAL MEDICINE

## 2020-10-05 PROCEDURE — 501629 HCHG TUBE, LUKI TRAP STERILE DISP: Performed by: INTERNAL MEDICINE

## 2020-10-05 RX ORDER — HALOPERIDOL 5 MG/ML
1 INJECTION INTRAMUSCULAR
Status: DISCONTINUED | OUTPATIENT
Start: 2020-10-05 | End: 2020-10-05 | Stop reason: HOSPADM

## 2020-10-05 RX ORDER — SODIUM CHLORIDE 9 MG/ML
INJECTION, SOLUTION INTRAVENOUS
Status: ACTIVE
Start: 2020-10-05 | End: 2020-10-06

## 2020-10-05 RX ORDER — SODIUM CHLORIDE, SODIUM LACTATE, POTASSIUM CHLORIDE, CALCIUM CHLORIDE 600; 310; 30; 20 MG/100ML; MG/100ML; MG/100ML; MG/100ML
INJECTION, SOLUTION INTRAVENOUS
Status: DISCONTINUED | OUTPATIENT
Start: 2020-10-05 | End: 2020-10-05 | Stop reason: SURG

## 2020-10-05 RX ORDER — ONDANSETRON 2 MG/ML
4 INJECTION INTRAMUSCULAR; INTRAVENOUS
Status: DISCONTINUED | OUTPATIENT
Start: 2020-10-05 | End: 2020-10-05 | Stop reason: HOSPADM

## 2020-10-05 RX ORDER — FAMOTIDINE 20 MG/1
10 TABLET, FILM COATED ORAL DAILY
Status: DISCONTINUED | OUTPATIENT
Start: 2020-10-05 | End: 2020-10-06 | Stop reason: HOSPADM

## 2020-10-05 RX ORDER — DIPHENHYDRAMINE HYDROCHLORIDE 50 MG/ML
12.5 INJECTION INTRAMUSCULAR; INTRAVENOUS
Status: DISCONTINUED | OUTPATIENT
Start: 2020-10-05 | End: 2020-10-05 | Stop reason: HOSPADM

## 2020-10-05 RX ORDER — ROCURONIUM BROMIDE 10 MG/ML
INJECTION, SOLUTION INTRAVENOUS PRN
Status: DISCONTINUED | OUTPATIENT
Start: 2020-10-05 | End: 2020-10-05 | Stop reason: SURG

## 2020-10-05 RX ADMIN — SODIUM CHLORIDE 125 MG: 9 INJECTION, SOLUTION INTRAVENOUS at 21:06

## 2020-10-05 RX ADMIN — DILTIAZEM HYDROCHLORIDE 240 MG: 240 CAPSULE, COATED, EXTENDED RELEASE ORAL at 05:38

## 2020-10-05 RX ADMIN — CEFTRIAXONE SODIUM 1 G: 1 INJECTION, POWDER, FOR SOLUTION INTRAMUSCULAR; INTRAVENOUS at 18:21

## 2020-10-05 RX ADMIN — FAMOTIDINE 10 MG: 20 TABLET, FILM COATED ORAL at 18:22

## 2020-10-05 RX ADMIN — SPIRONOLACTONE 25 MG: 25 TABLET ORAL at 05:38

## 2020-10-05 RX ADMIN — SODIUM CHLORIDE: 9 INJECTION, SOLUTION INTRAVENOUS at 05:39

## 2020-10-05 RX ADMIN — PANTOPRAZOLE SODIUM 40 MG: 40 INJECTION, POWDER, LYOPHILIZED, FOR SOLUTION INTRAVENOUS at 05:38

## 2020-10-05 RX ADMIN — SODIUM CHLORIDE, POTASSIUM CHLORIDE, SODIUM LACTATE AND CALCIUM CHLORIDE: 600; 310; 30; 20 INJECTION, SOLUTION INTRAVENOUS at 11:41

## 2020-10-05 RX ADMIN — FOLIC ACID 1 MG: 1 TABLET ORAL at 05:38

## 2020-10-05 RX ADMIN — VITAM B12 100 MCG: 100 TAB at 05:38

## 2020-10-05 RX ADMIN — PROPOFOL 50 MG: 10 INJECTION, EMULSION INTRAVENOUS at 11:41

## 2020-10-05 RX ADMIN — SUGAMMADEX 200 MG: 100 INJECTION, SOLUTION INTRAVENOUS at 12:33

## 2020-10-05 RX ADMIN — ROCURONIUM BROMIDE 30 MG: 10 INJECTION, SOLUTION INTRAVENOUS at 11:41

## 2020-10-05 RX ADMIN — LEVOTHYROXINE SODIUM 75 MCG: 0.07 TABLET ORAL at 05:39

## 2020-10-05 ASSESSMENT — COGNITIVE AND FUNCTIONAL STATUS - GENERAL
SUGGESTED CMS G CODE MODIFIER MOBILITY: CK
TURNING FROM BACK TO SIDE WHILE IN FLAT BAD: A LITTLE
HELP NEEDED FOR BATHING: A LOT
DAILY ACTIVITIY SCORE: 18
STANDING UP FROM CHAIR USING ARMS: A LITTLE
MOVING TO AND FROM BED TO CHAIR: A LITTLE
CLIMB 3 TO 5 STEPS WITH RAILING: A LOT
WALKING IN HOSPITAL ROOM: A LOT
DRESSING REGULAR LOWER BODY CLOTHING: A LITTLE
MOBILITY SCORE: 16
SUGGESTED CMS G CODE MODIFIER DAILY ACTIVITY: CK
TOILETING: A LITTLE
MOVING FROM LYING ON BACK TO SITTING ON SIDE OF FLAT BED: A LITTLE
DRESSING REGULAR UPPER BODY CLOTHING: A LOT

## 2020-10-05 ASSESSMENT — ENCOUNTER SYMPTOMS
BLURRED VISION: 0
EYE DISCHARGE: 0
DIARRHEA: 0
FEVER: 0
SPUTUM PRODUCTION: 0
DIAPHORESIS: 0
HEADACHES: 0
INSOMNIA: 0
COUGH: 0
SHORTNESS OF BREATH: 0
CONSTIPATION: 0
NAUSEA: 0
BACK PAIN: 0
VOMITING: 0
WEIGHT LOSS: 1
ABDOMINAL PAIN: 0
HEARTBURN: 0
WEAKNESS: 0
WHEEZING: 0
NERVOUS/ANXIOUS: 0
FLANK PAIN: 0
SINUS PAIN: 0
CLAUDICATION: 0
DEPRESSION: 0
DIZZINESS: 0
CHEST TIGHTNESS: 0
PALPITATIONS: 0

## 2020-10-05 ASSESSMENT — PAIN DESCRIPTION - PAIN TYPE
TYPE: SURGICAL PAIN
TYPE: SURGICAL PAIN

## 2020-10-05 ASSESSMENT — FIBROSIS 4 INDEX: FIB4 SCORE: 3.04

## 2020-10-05 NOTE — OR SURGEON
Post OP Note    PreOp Diagnosis: anemia    PostOp Diagnosis:    EGD    1/ normal esophagus, GEJ at 36 cm normal Z line    2/ normal stomach, antrum pylorus, duodenal bulb and second portion    3/normal egd otherwise     Colonoscopy    1/ cecal  2 cm polyp/mass snared removed with hot cautery completely two resolution clips and 1 cc ink tattoo   2/ sigmoid diverticulosis   3/ otherwise normal colonoscopy   4/ normal ileum    Procedure(s):  GASTROSCOPY - Wound Class: Clean Contaminated  COLONOSCOPY - Wound Class: Clean Contaminated  COLONOSCOPY, WITH POLYPECTOMY - Wound Class: Clean Contaminated    Surgeon(s):  J Carlos Palacios M.D.    Anesthesiologist/Type of Anesthesia:  Anesthesiologist: Juanpablo Dela Cruz M.D./General    Surgical Staff:  Endoscopy Technician: Nida Johnson  Endoscopy Nurse: Lacho Gomez R.N.    Specimens removed if any:  ID Type Source Tests Collected by Time Destination   A : cecal polyp, hot snare Tissue Cecum PATHOLOGY SPECIMEN J Carlos Palacios M.D. 10/5/2020 12:19 PM        Estimated Blood Loss: none    Findings:     Prior to the procedure the patient was informed the risks and benefits of the procedure and freely signed the consent form she was monitored understand monitoring blood pressure oxygen heart monitor no appreciable abnormality noted during the procedure.  Once adequate sedation was achieved she was placed in left lateral decubitus position bite-block was placed.  Using the standard Olympus gastroscope it was introduced under direct visualization through the oropharynx which appeared normal.  Intubation esophagus achieved easily and the entire length the esophagus appeared normal.  GE junction appreciate 36 cm normal-appearing Z line.  Intubation the gastric cavity allowed good insufflation for a panoramic view the entire gastric mucosa no overt mucosal abnormalities were noted.  Further advancement appreciation of the antrum pylorus all which appeared normal.   Intubation the pylorus and appreciation of duodenal bulb second third portion of the duodenum all the structures appeared normal.  Extubation the pylorus allowed retroflexion to be formed in the antrum to evaluate the incisura body fundus and cardia again no overt mucosal abnormalities were appreciated.  Gastroscope was then subsequently straightened excess air was removed and the scope was withdrawn.  Patient tolerated the procedure well.    Colonoscopy    Digital rectal exam, no internal/external hemorrhoids were appreciated poor sphincter tone was noted.  Using the standard variable stiffness colonoscope was introduced through the anus and advanced through manual manipulation to the cecum identified by appendiceal orifice ileocecal valve and cecal cap.  Within the cecum there is a 2 cm polyp/mass type structure that is appreciated.  Ileocecal valve was intubated ileum appeared normal.  Extubation ileocecal valve allowed further appreciation of the polyp/mass which was removed in a piecemeal fashion using hot snare cautery.  It was felt that the entire mass polyp was removed.  Subsequently 2 resolution clips were deployed upon the area and 1 cc of Tara ink was used to tattoo.  Subsequently the colonoscope was gradually withdrawn evaluating the ascending transverse and descending colons all which appeared normal.  Sigmoid colon demonstrated diverticulosis throughout no other mucosal abnormalities were appreciated.  Rectum appeared normal.  There is some internal/external hemorrhoids appreciated grade 2.  Excess air was removed and the patient tolerated the procedure well.  Complications: none    Recommendations    Await pathology findings  Recommend follow-up at [facility] [phone]  Advance diet as tolerated  Pending pathology findings further recommendations to follow, consider CEA level and/or CT scan of the abdomen pelvis  Further evaluation with a capsule endoscopy can be considered once pathology  findings have been obtained.  Recommend monitoring H&H and keeping greater than 7/21 with blood transfusions.      10/5/2020 12:37 PM J Carlos Palacios M.D.

## 2020-10-05 NOTE — ANESTHESIA PREPROCEDURE EVALUATION
Relevant Problems   NEURO   (+) S/P placement of cardiac pacemaker-MDT Micra leadless PPM      CARDIAC   (+) AF (atrial fibrillation) (HCC)   (+) Severe mitral valve regurgitation         (+) CKD (chronic kidney disease)      ENDO   (+) Hypothyroidism       Physical Exam    Airway   Mallampati: II  TM distance: >3 FB  Neck ROM: full       Cardiovascular - normal exam  Rhythm: regular  Rate: normal  (-) murmur     Dental - normal exam           Pulmonary - normal exam  Breath sounds clear to auscultation     Abdominal    Neurological - normal exam                 Anesthesia Plan    ASA 4       Plan - general       Airway plan will be ETT        Induction: intravenous    Postoperative Plan: Postoperative administration of opioids is intended.    Pertinent diagnostic labs and testing reviewed    Informed Consent:    Anesthetic plan and risks discussed with patient.    Use of blood products discussed with: patient whom consented to blood products.

## 2020-10-05 NOTE — ANESTHESIA POSTPROCEDURE EVALUATION
Patient: Lindsay Landrum    Procedure Summary     Date: 10/05/20 Room / Location: [facility] ROOM 26 / SURGERY SAME DAY [facility]    Anesthesia Start: 1141 Anesthesia Stop: 1242    Procedures:       GASTROSCOPY (N/A Esophagus)      COLONOSCOPY (N/A Anus)      COLONOSCOPY, WITH POLYPECTOMY (N/A Anus) Diagnosis: (cecum lesion/mass, cecum polyp, normal EGD)    Surgeon: J Carlos Palacios M.D. Responsible Provider: Juanpablo Dela Cruz M.D.    Anesthesia Type: general ASA Status: 4          Final Anesthesia Type: general  Last vitals  BP   Blood Pressure : (!) 93/53    Temp   36.2 °C (97.1 °F)    Pulse   Pulse: 69   Resp   16    SpO2   93 %      Anesthesia Post Evaluation    Patient location during evaluation: PACU  Patient participation: complete - patient participated  Level of consciousness: awake and alert    Airway patency: patent  Anesthetic complications: no  Cardiovascular status: hemodynamically stable  Respiratory status: acceptable  Hydration status: euvolemic    PONV: none           Nurse Pain Score: 0 (NPRS)

## 2020-10-05 NOTE — OR NURSING
1243 to pacu from or awake but sleepy mask at 4 liters report recvd Dr Dela Cruz and or rn pt no c/o pain or nausea   1300 resting comfortable warm blanket provided   1330 report called to [facility]   1342 pt returned to inpatient room t704-1 on monitor via adrienne with rn and cna

## 2020-10-05 NOTE — PROGRESS NOTES
Cardiology Follow Up Progress Note    Date of Service  10/5/2020    Attending Physician  Emerson Sal M.D.    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 Of note, patient was being evaluated by [facility] for open heart surgery for surgical intervention of her valvular disease, she was scheduled to undergo cardiac catheterization here with Dr. Génesis stacy.  However she came into the hospital because of feeling fatigue and found to have melena.  Patient denies having chest pain.     Interim Events  10/5: No cardiac issues or symptoms.    Review of Systems  Review of Systems   Respiratory: Negative for chest tightness and shortness of breath.    Cardiovascular: Negative for chest pain and palpitations.   Gastrointestinal: Negative for abdominal pain and nausea.   Neurological: Negative for dizziness and headaches.       Vital signs in last 24 hours  Temp:  [36.2 °C (97.1 °F)-36.8 °C (98.2 °F)] 36.7 °C (98.1 °F)  Pulse:  [70-72] 71  Resp:  [16-18] 17  BP: ()/(50-62) 112/62  SpO2:  [90 %-93 %] 90 %    Physical Exam  Physical Exam  Constitutional:       General: She is not in acute distress.  HENT:      Head: Normocephalic.   Eyes:      General: No scleral icterus.     Extraocular Movements: Extraocular movements intact.   Cardiovascular:      Rate and Rhythm: Normal rate and regular rhythm.      Heart sounds: Normal heart sounds, S1 normal and S2 normal. No murmur. No friction rub. No gallop.    Pulmonary:      Effort: Pulmonary effort is normal.       Breath sounds: Normal breath sounds. No wheezing, rhonchi or rales.   Chest:      Comments: PPM generator  Musculoskeletal:      Right lower leg: No edema.      Left lower leg: No edema.   Skin:     General: Skin is warm and dry.   Neurological:      Mental Status: She is alert and oriented to person, place, and time.   Psychiatric:         Behavior: Behavior normal.       Lab Review  Lab Results   Component Value Date/Time    WBC 9.5 10/05/2020 03:49 AM    RBC 3.17 (L) 10/05/2020 03:49 AM    HEMOGLOBIN 7.3 (L) 10/05/2020 03:49 AM    HEMATOCRIT 24.2 (L) 10/05/2020 03:49 AM    MCV 76.3 (L) 10/05/2020 03:49 AM    MCH 23.0 (L) 10/05/2020 03:49 AM    MCHC 30.2 (L) 10/05/2020 03:49 AM    MPV 10.7 10/05/2020 03:49 AM      Lab Results   Component Value Date/Time    SODIUM 134 (L) 10/05/2020 03:49 AM    POTASSIUM 3.5 (L) 10/05/2020 03:49 AM    CHLORIDE 99 10/05/2020 03:49 AM    CO2 20 10/05/2020 03:49 AM    GLUCOSE 98 10/05/2020 03:49 AM    BUN 34 (H) 10/05/2020 03:49 AM    CREATININE 1.46 (H) 10/05/2020 03:49 AM    BUNCREATRAT 18 07/27/2020 10:33 AM      Lab Results   Component Value Date/Time    ASTSGOT 28 09/30/2020 11:44 AM    ALTSGPT 14 09/30/2020 11:44 AM     Lab Results   Component Value Date/Time    TROPONINT 35 (H) 10/03/2020 09:55 PM       No results for input(s): NTPROBNP in the last 72 hours.    Cardiac Imaging and Procedures Review  Rhythm:  My personal interpretation of the rhythm dated 10/5/2020 is atrial fibrillation. V paced.    Echocardiogram:  10/04/2020  Compared to the report of the study done - 07/31/2020 there has been no   change.  Mildly reduced left ventricular systolic function. Left ventricular   ejection fraction is visually estimated to be 45%-50%.   Known transcatheter mitral valve repair which is functioning normally   with appropriate transvalvular gradient.   Mean transvalvular gradient is 4.0 mmHg at a heart rate of 66 BPM.   Severe mitral regurgitation.    Imaging  Chest X-Ray:  N/A      Assessment/Plan  Severe mitral regurgitation  Previous unsuccessful MitraClip procedure, 2018 [facility]  Permanent pacemaker  Chronic atrial fibrillation  GI bleed  Anemia severe  CKD  General debility    Recommendation Discussion  The patient is stable from a cardiac standpoint  The patient is cleared to undergo endoscopy procedure today.  Further recommendation regarding anticoagulation per GI    Thank you for allowing me to participate in the care of this patient.    Please contact me with any questions.    Randy Estrella M.D.   Cardiologist, [facility]  [phone]

## 2020-10-05 NOTE — PROGRESS NOTES
[facility] Medicine Daily Progress Note    Date of Service  10/5/2020    Chief Complaint  87 y.o. female admitted 10/3/2020 for fatigue for the past 5 days and dark stools.    Hospital Course    87F PMH A-fib, no AC, Mitral valve clip, hypothyroid, Vit D def, DLD presented 10/3/2020 for fatigue.  Patient was also mentioning melena, but she had been taking iron pills recently.  Patient does mention she has a long history of GI issues, having multiple scopes in the past in different states.  Patient was also found to have atrial fibrillation, not currently on anticoagulation as this is been stopped.  Her INR was subtherapeutic on arrival.  Patient has a history of mitral valve clipping.  Upon speaking with the patient, the she was scheduled for cardiac ablation by Dr. Rojo, which she was not able to complete.       Interval Problem Update  I have seen and examined this patient this morning.  Patient stated she did well after the EGD/Colonoscopy.  She had her friend present, all questions were answered.  Informed the patient to have PCP immediately in [place], OR when she moves on Wed, call insurance now to obtain PCP.  Suggestions for referral to Cardiology, Gastroenterology, laboratory needed and to obtain results from [facility] on colonic polyp biopsy.    Holding anticoagulation after polypectomy, risk of bleeding.    Consultants/Specialty  Cardiology, gastroenterology    Code Status  DNAR/DNI    Disposition  1 days    Review of Systems  Review of Systems   Constitutional: Positive for weight loss. Negative for diaphoresis and fever.   HENT: Negative for hearing loss and sinus pain.    Eyes: Negative for blurred vision and discharge.   Respiratory: Negative for cough, sputum production, shortness of breath and wheezing.    Cardiovascular: Negative for chest pain, palpitations, claudication and leg swelling.   Gastrointestinal: Positive for melena. Negative for abdominal pain, constipation, diarrhea, heartburn, nausea  and vomiting.        Decreased eating   Genitourinary: Negative for flank pain, frequency and urgency.   Musculoskeletal: Negative for back pain and joint pain.   Skin: Negative for itching and rash.   Neurological: Negative for dizziness, weakness and headaches.   Psychiatric/Behavioral: Negative for depression. The patient is not nervous/anxious and does not have insomnia.       Physical Exam  Temp:  [35.8 °C (96.5 °F)-36.8 °C (98.2 °F)] 36.5 °C (97.7 °F)  Pulse:  [69-72] 72  Resp:  [16-18] 18  BP: ()/(46-68) 108/63  SpO2:  [90 %-95 %] 91 %    Physical Exam  Constitutional:       General: She is not in acute distress.     Appearance: Normal appearance. She is not ill-appearing.      Comments: Elderly female    HENT:      Head: Normocephalic and atraumatic.      Right Ear: External ear normal.      Left Ear: External ear normal.      Nose: No congestion or rhinorrhea.      Mouth/Throat:      Pharynx: No oropharyngeal exudate or posterior oropharyngeal erythema.   Eyes:      Extraocular Movements: Extraocular movements intact.      Conjunctiva/sclera: Conjunctivae normal.      Pupils: Pupils are equal, round, and reactive to light.   Neck:      Musculoskeletal: Normal range of motion and neck supple. No neck rigidity.   Cardiovascular:      Rate and Rhythm: Normal rate.      Pulses: Normal pulses.      Heart sounds: Normal heart sounds. No murmur.   Pulmonary:      Effort: Pulmonary effort is normal. No respiratory distress.      Breath sounds: Normal breath sounds. No wheezing.   Abdominal:      General: Abdomen is flat. Bowel sounds are normal. There is no distension.      Palpations: Abdomen is soft.      Tenderness: There is no abdominal tenderness.   Musculoskeletal: Normal range of motion.         General: No swelling or tenderness.      Comments: Sarcopenic   Skin:     General: Skin is warm.      Capillary Refill: Capillary refill takes less than 2 seconds.      Coloration: Skin is not  jaundiced.      Findings: No erythema.   Neurological:      General: No focal deficit present.      Mental Status: She is alert and oriented to person, place, and time. Mental status is at baseline.      Motor: No weakness.   Psychiatric:         Mood and Affect: Mood normal.         Behavior: Behavior normal.         Thought Content: Thought content normal.       Fluids    Intake/Output Summary (Last 24 hours) at 10/5/2020 1629  Last data filed at 10/5/2020 1227  Gross per 24 hour   Intake 250 ml   Output --   Net 250 ml       Laboratory  Recent Labs     10/04/20  2119 10/05/20  0349 10/05/20  1420   WBC 9.9 9.5 12.7*   RBC 3.70* 3.17* 3.81*   HEMOGLOBIN 8.9* 7.3* 9.0*   HEMATOCRIT 28.6* 24.2* 29.9*   MCV 77.3* 76.3* 78.5*   MCH 24.1* 23.0* 23.6*   MCHC 31.1* 30.2* 30.1*   RDW 53.2* 52.7* 55.1*   PLATELETCT 218 192 214   MPV 10.7 10.7 11.3     Recent Labs     10/03/20  2155 10/05/20  0349   SODIUM 134* 134*   POTASSIUM 3.5* 3.5*   CHLORIDE 99 99   CO2 22 20   GLUCOSE 113* 98   BUN 34* 34*   CREATININE 1.41* 1.46*   CALCIUM 8.3* 7.9*     Recent Labs     10/03/20  2155 10/04/20  0451   APTT 28.6 24.9   INR 1.16* 1.20*               Imaging  EC-ECHOCARDIOGRAM LTD W/O CONT   Final Result         Assessment/Plan  * Gastrointestinal hemorrhage with melena- (present on admission)  Assessment & Plan  Hemoglobin remained stable  S/p EGD/Colonoscopy, results in GI note  -D/C protonix. Start famotidine given CKD.  -Continue telemetry  -Holding heparin for bleeding risk  -check AM CBC    AF (atrial fibrillation) (HCC)- (present on admission)  Assessment & Plan  Telemetry showing pacemaker and atrial flutter.  -Has not taken coumadin in the past 5 days  -Holding heparin drip, post-polypectomy high risk of bleeding.  -Continue home med diltiazem for rate control, BP parameters in place  -Anticoagulation to be restarted by PCP in [place]    Iron deficiency anemia secondary to inadequate dietary iron intake  Assessment &  Plan  Iron sat 5%  -starting Ferric Gluconate  -recommendation made for discharge    Malnutrition (HCC)  Assessment & Plan  Calorie malnutrition. Patient has poor appetite at home, eats very little. Stated she has poor taste to foods now.  Recommended patient dietary counseling  -increase eating at home  -recommended supplements at home    Severe mitral valve regurgitation- (present on admission)  Assessment & Plan  -Last ECHO in August shows severe mitral regurgitation despite mitral valve clip   -Type and screen done, protamine sulfate will be given if patient's GIB worsens  -patient to obtain PCP referral to Cardiology after moving.    CKD (chronic kidney disease)- (present on admission)  Assessment & Plan  -Chronic, no active issue  -Will avoid nephrotoxic agent at this time     VTE prophylaxis: Heparin drip

## 2020-10-05 NOTE — ANESTHESIA PROCEDURE NOTES
Airway    Date/Time: 10/5/2020 11:42 AM  Performed by: Juanpablo Dela Cruz M.D.  Authorized by: Juanpablo Dela Cruz M.D.     Location:  OR  Urgency:  Elective  Indications for Airway Management:  Anesthesia      Spontaneous Ventilation: absent    Sedation Level:  Deep  Preoxygenated: Yes    Patient Position:  Sniffing  Final Airway Type:  Endotracheal airway  Final Endotracheal Airway:  ETT  Cuffed: Yes    Technique Used for Successful ETT Placement:  Direct laryngoscopy    Insertion Site:  Oral  Blade Type:  Michael  Laryngoscope Blade/Videolaryngoscope Blade Size:  3  ETT Size (mm):  6.5  Measured from:  Teeth  ETT to Teeth (cm):  22  Placement Verified by: auscultation and capnometry    Cormack-Lehane Classification:  Grade I - full view of glottis  Number of Attempts at Approach:  1

## 2020-10-05 NOTE — ANESTHESIA TIME REPORT
Anesthesia Start and Stop Event Times     Date Time Event    10/5/2020 1135 Ready for Procedure     1141 Anesthesia Start     1242 Anesthesia Stop        Responsible Staff  10/05/20    Name Role Begin End    Juanpablo Dela Cruz M.D. Anesth 1151 1242        Preop Diagnosis (Free Text):  Pre-op Diagnosis     ANEMIA,GI BLEED        Preop Diagnosis (Codes):    Post op Diagnosis  GI bleed      Premium Reason  Non-Premium    Comments:

## 2020-10-05 NOTE — DIETARY
"Nutrition services: Day 1 of admit.  Lindsay Landrum is a 87 y.o. female with admitting DX of fatigue.    Consult received for MST 3 with unsure weight loss in 3 months and poor PO intake.    Pt reports has a \"good diet\" at home but has been having dry heaves for past 5 days prior to admit and unable to hold food down (<25% of meals). States usual weight of 110 lbs and current weight is 106 lbs via stand up scale, with 4 lb weight loss in unknown time frame.    Assessment:  Height: 157.5 cm (5' 2\")  Weight: 48 kg (106 lbs))  Body mass index is 19.39 kg/m²., BMI classification: normal  Diet/Intake: clear liquids, having 1st meal now    Evaluation:   1. PMH of GERD, HLD, A fib, and shortness of breath.  2. Pt went for EGD which was normal and colonoscopy today showing 2 cm polyp/mass that was removed and sigmoid diverticulosis.  3. Pt now on clear liquids and tolerated jello at visit. Pt reports eating <25% of meals for 5 days d/t N/V. Per chart review, pt ranges 107-110 lbs since July 2020 with no confirmed weight loss.  4. Observed pt with some muscle/fat loss however, appears within normal parameters for pt's age range.  5. Labs: Na 134, K 3.5, BUN 34, creatinine 1.46  6. Meds: B12, folic acid, synthroid, protonix, aldactone, heparin infusion, NS infusion  7. Last BM: 10/5 watery    Malnutrition Risk: Malnutrition criteria not met at this time.    Recommendations/Plan:  1. Advance diet to liberalized regular diet.  2. Encourage intake of meals.  3. Document intake of all meals as % taken in ADL's to provide interdisciplinary communication across all shifts.   4. Monitor weight.  5. Nutrition rep will continue to see patient for ongoing meal and snack preferences.     RD to follow.            "

## 2020-10-05 NOTE — PROGRESS NOTES
[facility] Medicine Daily Progress Note    Date of Service  10/4/2020    Chief Complaint  87 y.o. female admitted 10/3/2020 for fatigue for the past 5 days and dark stools.    Hospital Course    87F PMH A-fib, no AC, Mitral valve clip, hypothyroid, Vit D def, DLD presented 10/3/2020 for fatigue.  Patient was also mentioning melena, but she had been taking iron pills recently.  Patient does mention she has a long history of GI issues, having multiple scopes in the past in different states.  Patient was also found to have atrial fibrillation, not currently on anticoagulation as this is been stopped.  Her INR was subtherapeutic on arrival.  Patient has a history of mitral valve clipping.  Upon speaking with the patient, the she was scheduled for cardiac ablation by Dr. Rojo, which she was not able to complete.       Interval Problem Update  I have seen and examined this patient this morning.  Patient states she was doing better this morning.  Patient was stating that she had not eaten for the past 5 days unable to exactly state the reason.  Patient was not mentioning she had a poor appetite or had decreased desire to eat or fear of eating.  Patient denied any chest pain, headache, shortness of breath, abdominal pains, changes in urine.    As per nursing patient was placed on a heparin drip given her low INR and mitral valve disorder causing atrial fibrillation.  Patient showed no signs of bleeding overnight.  Bowel movements none while on the medical floor.    Gastroenterology was consulted for possible endoscopy.  Patient's history is difficult to discern given her multiple elective procedures in different states.  Patient is unable to move her the name of her gastroenterologist.    Cardiology was consulted as well for her chronic cardiac conditions.  Given she was scheduled for an ablation requesting consultation.    Consultants/Specialty  Cardiology, gastroenterology    Code Status  DNAR/DNI    Disposition  2  days    Review of Systems  Review of Systems   Constitutional: Positive for weight loss. Negative for diaphoresis and fever.   HENT: Negative for hearing loss and sinus pain.    Eyes: Negative for blurred vision and discharge.   Respiratory: Negative for cough, sputum production, shortness of breath and wheezing.    Cardiovascular: Negative for chest pain, palpitations, claudication and leg swelling.   Gastrointestinal: Positive for melena. Negative for abdominal pain, constipation, diarrhea, heartburn, nausea and vomiting.        Decreased eating   Genitourinary: Negative for flank pain, frequency and urgency.   Musculoskeletal: Negative for back pain and joint pain.   Skin: Negative for itching and rash.   Neurological: Negative for dizziness, weakness and headaches.   Psychiatric/Behavioral: Negative for depression. The patient is not nervous/anxious and does not have insomnia.       Physical Exam  Temp:  [36.2 °C (97.1 °F)-37.3 °C (99.1 °F)] 36.2 °C (97.1 °F)  Pulse:  [69-71] 70  Resp:  [14-19] 18  BP: ()/(50-65) 101/50  SpO2:  [90 %-98 %] 90 %    Physical Exam  Constitutional:       General: She is not in acute distress.     Appearance: Normal appearance. She is not ill-appearing.      Comments: Elderly female    HENT:      Head: Normocephalic and atraumatic.      Right Ear: External ear normal.      Left Ear: External ear normal.      Nose: No congestion or rhinorrhea.      Mouth/Throat:      Pharynx: No oropharyngeal exudate or posterior oropharyngeal erythema.   Eyes:      Extraocular Movements: Extraocular movements intact.      Conjunctiva/sclera: Conjunctivae normal.      Pupils: Pupils are equal, round, and reactive to light.   Neck:      Musculoskeletal: Normal range of motion and neck supple. No neck rigidity.   Cardiovascular:      Rate and Rhythm: Normal rate.      Pulses: Normal pulses.      Heart sounds: Normal heart sounds. No murmur.   Pulmonary:      Effort: Pulmonary effort is  normal. No respiratory distress.      Breath sounds: Normal breath sounds. No wheezing.   Abdominal:      General: Abdomen is flat. Bowel sounds are normal. There is no distension.      Palpations: Abdomen is soft.      Tenderness: There is no abdominal tenderness.   Musculoskeletal: Normal range of motion.         General: No swelling or tenderness.      Comments: Sarcopenic   Skin:     General: Skin is warm.      Capillary Refill: Capillary refill takes less than 2 seconds.      Coloration: Skin is not jaundiced.      Findings: No erythema.   Neurological:      General: No focal deficit present.      Mental Status: She is alert and oriented to person, place, and time. Mental status is at baseline.      Motor: No weakness.   Psychiatric:         Mood and Affect: Mood normal.         Behavior: Behavior normal.         Thought Content: Thought content normal.       Fluids    Intake/Output Summary (Last 24 hours) at 10/4/2020 1734  Last data filed at 10/4/2020 1600  Gross per 24 hour   Intake 420 ml   Output 550 ml   Net -130 ml       Laboratory  Recent Labs     10/04/20  0327 10/04/20  0451 10/04/20  1317   WBC 9.7 9.8 8.9   RBC 4.16* 3.64* 3.66*   HEMOGLOBIN 9.7* 8.5* 8.6*   HEMATOCRIT 31.8* 27.8* 28.2*   MCV 76.4* 76.4* 77.0*   MCH 23.3* 23.4* 23.5*   MCHC 30.5* 30.6* 30.5*   RDW 52.6* 52.6* 53.0*   PLATELETCT 147* 185 182   MPV 10.5 10.8 10.7     Recent Labs     10/03/20  2155   SODIUM 134*   POTASSIUM 3.5*   CHLORIDE 99   CO2 22   GLUCOSE 113*   BUN 34*   CREATININE 1.41*   CALCIUM 8.3*     Recent Labs     10/03/20  2155 10/04/20  0451   APTT 28.6 24.9   INR 1.16* 1.20*               Imaging  EC-ECHOCARDIOGRAM LTD W/O CONT   Final Result         Assessment/Plan  * Gastrointestinal hemorrhage with melena- (present on admission)  Assessment & Plan  Hemoglobin remained stable  -Patient is taking iron pill and we will need occult blood x3, patient has been unable to provide any stool samples as she has not eaten for  the past 5 days  -Will start patient on Protonix 40mg BID  -GI consult -scheduling for EGD/colonoscopy  -CBC will be checked q6 hours  -Monitor BP and for tachycardia  -Will be on tele monitor   -Patient is on heparin, patient is accepted to receive heparin drip despite the high risk of bleeding as her benefits outweigh the risk at this moment.    AF (atrial fibrillation) (HCC)- (present on admission)  Assessment & Plan  -Has not taken coumadin in the past 5 days  -Continue heparin drip, will stop 6 hours prior to endoscopy/colonoscopy  -Continue home med diltiazem for rate control, BP parameters in place    Severe mitral valve regurgitation- (present on admission)  Assessment & Plan  -Last ECHO in August shows severe mitral regurgitation despite mitral valve clip   -Cardiology consult, appreciate recommendations  -Type and screen done, protamine sulfate will be given if patient's GIB worsens    CKD (chronic kidney disease)- (present on admission)  Assessment & Plan  -Chronic, no active issue  -Will avoid nephrotoxic agent at this time     VTE prophylaxis: Heparin drip

## 2020-10-05 NOTE — ASSESSMENT & PLAN NOTE
Calorie malnutrition. Patient has poor appetite at home, eats very little. Stated she has poor taste to foods now.  Recommended patient dietary counseling  -increase eating at home  -recommended supplements at home

## 2020-10-05 NOTE — ANESTHESIA QCDR
2019 [facility] Clinical Data Registry (for Quality Improvement)     Postoperative nausea/vomiting risk protocol (Adult = 18 yrs and Pediatric 3-17 yrs)- (430 and 463)  General inhalation anesthetic (NOT TIVA) with PONV risk factors: Yes  Provision of anti-emetic therapy with at least 2 different classes of agents: Yes   Patient DID NOT receive anti-emetic therapy and reason is documented in Medical Record:  N/A    Multimodal Pain Management- (477)  Non-emergent surgery AND patient age >= 18: Yes  Use of Multimodal Pain Management, two or more drugs and/or interventions, NOT including systemic opioids:   Exception: Documented allergy to multiple classes of analgesics:     Smoking Abstinence (404)  Patient is current smoker (cigarette, pipe, e-cig, marijuanna):   Elective Surgery:   Abstinence instructions provided prior to day of surgery:   Patient abstained from smoking on day of surgery:     Pre-Op Beta-Blocker in Isolated CABG (44)  Isolated CABG AND patient age >= 18:   Beta-blocker admin within 24 hours of surgical incision:   Exception:of medical reason(s) for not administering beta blocker within 24 hours prior to surgical incision (e.g., not  indicated,other medical reason):     PACU assessment of acute postoperative pain prior to Anesthesia Care End- Applies to Patients Age = 18- (ABG7)  Initial PACU pain score is which of the following: < 7/10  Patient unable to report pain score: N/A    Post-anesthetic transfer of care checklist/protocol to PACU/ICU- (426 and 427)  Upon conclusion of case, patient transferred to which of the following locations: PACU/Non-ICU  Use of transfer checklist/protocol: Yes  Exclusion: Service Performed in Patient Hospital Room (and thus did not require transfer): N/A  Unplanned admission to ICU related to anesthesia service up through end of PACU care- (MD51)  Unplanned admission to ICU (not initially anticipated at anesthesia start time): No

## 2020-10-06 ENCOUNTER — APPOINTMENT (OUTPATIENT)
Dept: RADIOLOGY | Facility: MEDICAL CENTER | Age: 85
End: 2020-10-06
Attending: EMERGENCY MEDICINE
Payer: MEDICARE

## 2020-10-06 ENCOUNTER — HOSPITAL ENCOUNTER (EMERGENCY)
Facility: MEDICAL CENTER | Age: 85
End: 2020-10-06
Attending: EMERGENCY MEDICINE
Payer: MEDICARE

## 2020-10-06 VITALS
RESPIRATION RATE: 18 BRPM | BODY MASS INDEX: 19.88 KG/M2 | HEIGHT: 62 IN | TEMPERATURE: 96.8 F | WEIGHT: 108 LBS | SYSTOLIC BLOOD PRESSURE: 155 MMHG | OXYGEN SATURATION: 91 % | DIASTOLIC BLOOD PRESSURE: 88 MMHG | HEART RATE: 58 BPM

## 2020-10-06 VITALS
WEIGHT: 108.47 LBS | TEMPERATURE: 97.7 F | HEIGHT: 62 IN | DIASTOLIC BLOOD PRESSURE: 60 MMHG | SYSTOLIC BLOOD PRESSURE: 106 MMHG | HEART RATE: 71 BPM | RESPIRATION RATE: 17 BRPM | OXYGEN SATURATION: 90 % | BODY MASS INDEX: 19.96 KG/M2
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
## 2020-10-06 DIAGNOSIS — S81.812A SKIN TEAR OF LEFT LOWER LEG WITHOUT COMPLICATION, INITIAL ENCOUNTER: ICD-10-CM

## 2020-10-06 LAB
ANION GAP SERPL CALC-SCNC: 14 MMOL/L (ref 7–16)
BACTERIA UR CULT: ABNORMAL
BACTERIA UR CULT: ABNORMAL
BUN SERPL-MCNC: 30 MG/DL (ref 8–22)
CALCIUM SERPL-MCNC: 8.1 MG/DL (ref 8.5–10.5)
CHLORIDE SERPL-SCNC: 100 MMOL/L (ref 96–112)
CO2 SERPL-SCNC: 18 MMOL/L (ref 20–33)
CREAT SERPL-MCNC: 1.32 MG/DL (ref 0.5–1.4)
ERYTHROCYTE [DISTWIDTH] IN BLOOD BY AUTOMATED COUNT: 56.2 FL (ref 35.9–50)
GLUCOSE SERPL-MCNC: 144 MG/DL (ref 65–99)
HCT VFR BLD AUTO: 27.6 % (ref 37–47)
HGB BLD-MCNC: 8.2 G/DL (ref 12–16)
MAGNESIUM SERPL-MCNC: 2.3 MG/DL (ref 1.5–2.5)
MCH RBC QN AUTO: 23.5 PG (ref 27–33)
MCHC RBC AUTO-ENTMCNC: 29.7 G/DL (ref 33.6–35)
MCV RBC AUTO: 79.1 FL (ref 81.4–97.8)
PHOSPHATE SERPL-MCNC: 3 MG/DL (ref 2.5–4.5)
PLATELET # BLD AUTO: 198 K/UL (ref 164–446)
PMV BLD AUTO: 11 FL (ref 9–12.9)
POTASSIUM SERPL-SCNC: 3.9 MMOL/L (ref 3.6–5.5)
RBC # BLD AUTO: 3.49 M/UL (ref 4.2–5.4)
SIGNIFICANT IND 70042: ABNORMAL
SITE SITE: ABNORMAL
SODIUM SERPL-SCNC: 132 MMOL/L (ref 135–145)
SOURCE SOURCE: ABNORMAL
WBC # BLD AUTO: 6.6 K/UL (ref 4.8–10.8)

## 2020-10-06 PROCEDURE — 700102 HCHG RX REV CODE 250 W/ 637 OVERRIDE(OP): Performed by: INTERNAL MEDICINE

## 2020-10-06 PROCEDURE — 72100 X-RAY EXAM L-S SPINE 2/3 VWS: CPT

## 2020-10-06 PROCEDURE — 80048 BASIC METABOLIC PNL TOTAL CA: CPT

## 2020-10-06 PROCEDURE — A9270 NON-COVERED ITEM OR SERVICE: HCPCS | Performed by: INTERNAL MEDICINE

## 2020-10-06 PROCEDURE — 36415 COLL VENOUS BLD VENIPUNCTURE: CPT

## 2020-10-06 PROCEDURE — 700105 HCHG RX REV CODE 258: Performed by: INTERNAL MEDICINE

## 2020-10-06 PROCEDURE — 84100 ASSAY OF PHOSPHORUS: CPT

## 2020-10-06 PROCEDURE — 99238 HOSP IP/OBS DSCHRG MGMT 30/<: CPT | Performed by: STUDENT IN AN ORGANIZED HEALTH CARE EDUCATION/TRAINING PROGRAM

## 2020-10-06 PROCEDURE — 83735 ASSAY OF MAGNESIUM: CPT

## 2020-10-06 PROCEDURE — 70450 CT HEAD/BRAIN W/O DYE: CPT

## 2020-10-06 PROCEDURE — 85027 COMPLETE CBC AUTOMATED: CPT

## 2020-10-06 PROCEDURE — 99232 SBSQ HOSP IP/OBS MODERATE 35: CPT | Performed by: INTERNAL MEDICINE

## 2020-10-06 PROCEDURE — 73590 X-RAY EXAM OF LOWER LEG: CPT | Mod: LT

## 2020-10-06 PROCEDURE — 99284 EMERGENCY DEPT VISIT MOD MDM: CPT

## 2020-10-06 RX ORDER — FERROUS SULFATE 325(65) MG
325 TABLET ORAL DAILY
Qty: 30 TAB | Refills: 0 | Status: SHIPPED | OUTPATIENT
Start: 2020-10-06

## 2020-10-06 RX ORDER — FAMOTIDINE 10 MG
10 TABLET ORAL DAILY
Qty: 60 TAB | Refills: 0 | Status: SHIPPED
Start: 2020-10-07 | End: 2021-08-15

## 2020-10-06 RX ADMIN — LEVOTHYROXINE SODIUM 75 MCG: 0.07 TABLET ORAL at 05:49

## 2020-10-06 RX ADMIN — DILTIAZEM HYDROCHLORIDE 240 MG: 240 CAPSULE, COATED, EXTENDED RELEASE ORAL at 05:49

## 2020-10-06 RX ADMIN — FAMOTIDINE 10 MG: 20 TABLET, FILM COATED ORAL at 05:49

## 2020-10-06 RX ADMIN — FOLIC ACID 1 MG: 1 TABLET ORAL at 05:49

## 2020-10-06 RX ADMIN — SPIRONOLACTONE 25 MG: 25 TABLET ORAL at 05:49

## 2020-10-06 RX ADMIN — VITAM B12 100 MCG: 100 TAB at 05:49

## 2020-10-06 RX ADMIN — SODIUM CHLORIDE: 9 INJECTION, SOLUTION INTRAVENOUS at 08:05

## 2020-10-06 ASSESSMENT — ENCOUNTER SYMPTOMS
ABDOMINAL PAIN: 0
EYES NEGATIVE: 1
DIZZINESS: 0
CARDIOVASCULAR NEGATIVE: 1
HEADACHES: 0
GASTROINTESTINAL NEGATIVE: 1
NEUROLOGICAL NEGATIVE: 1
CONSTITUTIONAL NEGATIVE: 1
CHEST TIGHTNESS: 0
PALPITATIONS: 0
RESPIRATORY NEGATIVE: 1
SHORTNESS OF BREATH: 0
NAUSEA: 0

## 2020-10-06 ASSESSMENT — FIBROSIS 4 INDEX: FIB4 SCORE: 3.29

## 2020-10-06 NOTE — PROGRESS NOTES
Gastroenterology Progress Note     Author: J Carlos Palacios M.D.   Date & Time Created: 10/6/2020 11:04 AM    Chief Complaint:  JIN     Interval History:  S/p EGD which was noted to be normal  Colon mas/polyp removed in cecum path pending  S/p iron infusion  No complaints     Review of Systems:  Review of Systems   Constitutional: Negative.    HENT: Negative.    Eyes: Negative.    Respiratory: Negative.    Cardiovascular: Negative.    Gastrointestinal: Negative.    Neurological: Negative.        Physical Exam:  Physical Exam  HENT:      Head: Normocephalic.      Mouth/Throat:      Mouth: Mucous membranes are moist.   Cardiovascular:      Rate and Rhythm: Normal rate and regular rhythm.      Pulses: Normal pulses.      Heart sounds: Normal heart sounds.   Pulmonary:      Effort: Pulmonary effort is normal.      Breath sounds: Normal breath sounds.   Abdominal:      General: Abdomen is flat. Bowel sounds are normal.      Palpations: Abdomen is soft.   Neurological:      General: No focal deficit present.      Mental Status: She is alert.         Labs:          Recent Labs     10/03/20  2155 10/05/20  0349 10/06/20  0420   SODIUM 134* 134* 132*   POTASSIUM 3.5* 3.5* 3.9   CHLORIDE 99 99 100   CO2 22 20 18*   BUN 34* 34* 30*   CREATININE 1.41* 1.46* 1.32   MAGNESIUM  --   --  2.3   PHOSPHORUS  --   --  3.0   CALCIUM 8.3* 7.9* 8.1*     Recent Labs     10/03/20  2155 10/05/20  0349 10/06/20  0420   GLUCOSE 113* 98 144*     Recent Labs     10/03/20  2155  10/04/20  0451  10/05/20  0349 10/05/20  1420 10/06/20  0420   RBC 3.73*   < > 3.64*   < > 3.17* 3.81* 3.49*   HEMOGLOBIN 8.9*   < > 8.5*   < > 7.3* 9.0* 8.2*   HEMATOCRIT 28.7*   < > 27.8*   < > 24.2* 29.9* 27.6*   PLATELETCT 192   < > 185   < > 192 214 198   PROTHROMBTM 15.2*  --  15.6*  --   --   --   --    APTT 28.6  --  24.9  --   --   --   --    INR 1.16*  --  1.20*  --   --   --   --    IRON  --   --   --   --  15*  --   --    FERRITIN  --   --   --   --  70.1   --   --    [facility]  --   --   --   --  282  --   --     < > = values in this interval not displayed.     Recent Labs     10/04/20  2119 10/05/20  0349 10/05/20  1420 10/06/20  0420   WBC 9.9 9.5 12.7* 6.6   NEUTSPOLYS 79.00* 81.40* 92.60*  --    LYMPHOCYTES 10.40* 9.20* 4.30*  --    MONOCYTES 9.30 8.20 1.90  --    EOSINOPHILS 0.20 0.30 0.00  --    BASOPHILS 0.40 0.20 0.40  --      Hemodynamics:  Temp (24hrs), Av.5 °C (97.7 °F), Min:35.8 °C (96.5 °F), Max:36.8 °C (98.2 °F)  Temperature: 36.5 °C (97.7 °F)  Pulse  Av.4  Min: 68  Max: 74   Blood Pressure : 106/60     Respiratory:    Respiration: 17, Pulse Oximetry: 90 %           Fluids:    Intake/Output Summary (Last 24 hours) at 10/6/2020 1104  Last data filed at 10/5/2020 2206  Gross per 24 hour   Intake 250 ml   Output 250 ml   Net 0 ml     Weight: 49.2 kg (108 lb 7.5 oz)  GI/Nutrition:  Orders Placed This Encounter   Procedures   • Diet Order Cardiac     Standing Status:   Standing     Number of Occurrences:   1     Order Specific Question:   Diet:     Answer:   Cardiac [6]     Medical Decision Making, by Problem:  Active Hospital Problems    Diagnosis   • *Gastrointestinal hemorrhage with melena [K92.1]   • AF (atrial fibrillation) (HCC) [I48.91]   • Malnutrition (HCC) [E46]   • Iron deficiency anemia secondary to inadequate dietary iron intake [D50.8]   • Severe mitral valve regurgitation [I34.0]   • CKD (chronic kidney disease) [N18.9]       Plan:  She will need f/u as o/p I gave her may information and office number  She plans on moving to [place] today or tomorrow please make sure she has appropriate follow up on the pathology of her colonoscopy and she will need an capsule endoscopy as well.    Call if any issues [phone]  We will sign off.    Quality-Core Measures

## 2020-10-06 NOTE — DISCHARGE INSTRUCTIONS
Discuss with your Primary Care Physician regarding re-starting your warfarin.   No need for antibiotic for UTI.       Discharge Instructions    Discharged to home by car with relative. Discharged via wheelchair, hospital escort: Yes.  Special equipment needed: Not Applicable    Be sure to schedule a follow-up appointment with your primary care doctor or any specialists as instructed.     Discharge Plan:   Diet Plan: Discussed  Activity Level: Discussed  Confirmed Follow up Appointment: Patient to Call and Schedule Appointment  Confirmed Symptoms Management: Discussed  Medication Reconciliation Updated: Yes  Influenza Vaccine Indication: Patient Refuses    I understand that a diet low in cholesterol, fat, and sodium is recommended for good health. Unless I have been given specific instructions below for another diet, I accept this instruction as my diet prescription.   Other diet: Heart Healthy    Special Instructions: None    · Is patient discharged on Warfarin / Coumadin?   No     Depression / Suicide Risk    As you are discharged from this [facility] facility, it is important to learn how to keep safe from harming yourself.    Recognize the warning signs:  · Abrupt changes in personality, positive or negative- including increase in energy   · Giving away possessions  · Change in eating patterns- significant weight changes-  positive or negative  · Change in sleeping patterns- unable to sleep or sleeping all the time   · Unwillingness or inability to communicate  · Depression  · Unusual sadness, discouragement and loneliness  · Talk of wanting to die  · Neglect of personal appearance   · Rebelliousness- reckless behavior  · Withdrawal from people/activities they love  · Confusion- inability to concentrate     If you or a loved one observes any of these behaviors or has concerns about self-harm, here's what you can do:  · Talk about it- your feelings and reasons for harming yourself  · Remove any means that you  might use to hurt yourself (examples: pills, rope, extension cords, firearm)  · Get professional help from the community (Mental Health, Substance Abuse, psychological counseling)  · Do not be alone:Call your Safe Contact- someone whom you trust who will be there for you.  · Call your local CRISIS HOTLINE 881-8971 or 053-114-8640  · Call your local Children's Mobile Crisis Response Team [place] (718) 834-3088 or www.[employer]  · Call the toll free National Suicide Prevention Hotlines   · National Suicide Prevention Lifeline 094-261-HHCA (1364)  · National Hope Line Network 800-SUICIDE (515-9100)  Depression / Suicide Risk    As you are discharged from this [facility] facility, it is important to learn how to keep safe from harming yourself.    Recognize the warning signs:  · Abrupt changes in personality, positive or negative- including increase in energy   · Giving away possessions  · Change in eating patterns- significant weight changes-  positive or negative  · Change in sleeping patterns- unable to sleep or sleeping all the time   · Unwillingness or inability to communicate  · Depression  · Unusual sadness, discouragement and loneliness  · Talk of wanting to die  · Neglect of personal appearance   · Rebelliousness- reckless behavior  · Withdrawal from people/activities they love  · Confusion- inability to concentrate     If you or a loved one observes any of these behaviors or has concerns about self-harm, here's what you can do:  · Talk about it- your feelings and reasons for harming yourself  · Remove any means that you might use to hurt yourself (examples: pills, rope, extension cords, firearm)  · Get professional help from the community (Mental Health, Substance Abuse, psychological counseling)  · Do not be alone:Call your Safe Contact- someone whom you trust who will be there for you.  · Call your local CRISIS HOTLINE 766-1964 or 437-404-6036  · Call your local Children's Mobile Crisis  Response Team [facility] (439) 885-8456 or www.[employer]  · Call the toll free National Suicide Prevention Hotlines   · National Suicide Prevention Lifeline 901-965-IICX (9606)  · National Hope Line Network 800-SUICIDE (381-5655)    Famotidine tablets or gelcaps  What is this medicine?  FAMOTIDINE (fa FABRIZIO ti lydia) is a type of antihistamine that blocks the release of stomach acid. It is used to treat stomach or intestinal ulcers. It can also relieve heartburn from acid reflux.  This medicine may be used for other purposes; ask your health care provider or pharmacist if you have questions.  COMMON BRAND NAME(S): Heartburn Relief, Pepcid, Pepcid AC, Pepcid AC Maximum Strength  What should I tell my health care provider before I take this medicine?  They need to know if you have any of these conditions:  · kidney or liver disease  · trouble swallowing  · an unusual or allergic reaction to famotidine, other medicines, foods, dyes, or preservatives  · pregnant or trying to get pregnant  · breast-feeding  How should I use this medicine?  Take this medicine by mouth with a glass of water. Follow the directions on the prescription label. If you only take this medicine once a day, take it at bedtime. Take your doses at regular intervals. Do not take your medicine more often than directed.  Talk to your pediatrician regarding the use of this medicine in children. Special care may be needed.  Overdosage: If you think you have taken too much of this medicine contact a poison control center or emergency room at once.  NOTE: This medicine is only for you. Do not share this medicine with others.  What if I miss a dose?  If you miss a dose, take it as soon as you can. If it is almost time for your next dose, take only that dose. Do not take double or extra doses.  What may interact with this medicine?  · delavirdine  · itraconazole  · ketoconazole  This list may not describe all possible interactions. Give your health  care provider a list of all the medicines, herbs, non-prescription drugs, or dietary supplements you use. Also tell them if you smoke, drink alcohol, or use illegal drugs. Some items may interact with your medicine.  What should I watch for while using this medicine?  Tell your doctor or health care professional if your condition does not start to get better or if it gets worse. Finish the full course of tablets prescribed, even if you feel better.  Do not take with aspirin, ibuprofen or other antiinflammatory medicines. These can make your condition worse.  Do not smoke cigarettes or drink alcohol. These cause irritation in your stomach and can increase the time it will take for ulcers to heal.  If you get black, tarry stools or vomit up what looks like coffee grounds, call your doctor or health care professional at once. You may have a bleeding ulcer.  This medicine may cause a decrease in vitamin B12. You should make sure that you get enough vitamin B12 while you are taking this medicine. Discuss the foods you eat and the vitamins you take with your health care professional.  What side effects may I notice from receiving this medicine?  Side effects that you should report to your doctor or health care professional as soon as possible:  · agitation, nervousness  · confusion  · hallucinations  · skin rash, itching  Side effects that usually do not require medical attention (report to your doctor or health care professional if they continue or are bothersome):  · constipation  · diarrhea  · dizziness  · headache  This list may not describe all possible side effects. Call your doctor for medical advice about side effects. You may report side effects to FDA at 2-460-WAZ-6453.  Where should I keep my medicine?  Keep out of the reach of children.  Store at room temperature between 15 and 30 degrees C (59 and 86 degrees F). Do not freeze. Throw away any unused medicine after the expiration date.  NOTE: This sheet is a  summary. It may not cover all possible information. If you have questions about this medicine, talk to your doctor, pharmacist, or health care provider.  © 2020 Elsevier/Gold Standard (2018-08-03 13:17:50)      Iron tablets, capsules, extended-release tablets  What is this medicine?  IRON (AHY fany) replaces iron that is essential to healthy red blood cells. Iron is used to treat iron deficiency anemia. Anemia may cause problems like tiredness, shortness of breath, or slowed growth in children. Only take iron if your doctor has told you to. Do not treat yourself with iron if you are feeling tired. Most healthy people get enough iron in their diets, particularly if they eat cereals, meat, poultry, and fish.  This medicine may be used for other purposes; ask your health care provider or pharmacist if you have questions.  COMMON BRAND NAME(S): Auryxia, Bifera, Duofer, EZFE, Feosol, Feosol Complete, Feosol Natural Release, Feratab, Ferate, Fergon, Ferretts, Ferrex 150, Ferrex-150, Ferrimin, Josué-Sequels, Ferrocite, FerUS, Hemocyte, iFerex 150, Nephro-Nghia, Niferex, NovaFerrum, Nu-Iron, Poly-Iron, ProFe, Proferrin ES, Slow Fe, Slow Iron, Tandem  What should I tell my health care provider before I take this medicine?  They need to know if you have any of these conditions:  · frequently drink alcohol  · bowel disease  · hemolytic anemia  · iron overload (hemochromatosis, hemosiderosis)  · liver disease  · problems with swallowing  · stomach ulcer or other stomach problems  · an unusual or allergic reaction to iron, other medicines, foods, dyes, or preservatives  · pregnant or trying to get pregnant  · breast-feeding  How should I use this medicine?  Take this medicine by mouth with a glass of water or fruit juice. Follow the directions on the prescription label. Swallow whole. Do not crush or chew. Take this medicine in an upright or sitting position. Try to take any bedtime doses at least 10 minutes before lying down. You  may take this medicine with food. Take your medicine at regular intervals. Do not take your medicine more often than directed. Do not stop taking except on your doctor's advice.  Talk to your pediatrician regarding the use of this medicine in children. While this drug may be prescribed for selected conditions, precautions do apply.  Overdosage: If you think you have taken too much of this medicine contact a poison control center or emergency room at once.  NOTE: This medicine is only for you. Do not share this medicine with others.  What if I miss a dose?  If you miss a dose, take it as soon as you can. If it is almost time for your next dose, take only that dose. Do not take double or extra doses.  What may interact with this medicine?  If you are taking this iron product, you should not take iron in any other medicine or dietary supplement.  This medicine may also interact with the following medications:  · alendronate  · antacids  · cefdinir  · chloramphenicol  · cholestyramine  · deferoxamine  · dimercaprol  · etidronate  · medicines for stomach ulcers or other stomach problems  · pancreatic enzymes  · quinolone antibiotics (examples: Cipro, Floxin, Levaquin, Tequin and others)  · risedronate  · tetracycline antibiotics (examples: doxycycline, tetracycline, minocycline, and others)  · thyroid hormones  This list may not describe all possible interactions. Give your health care provider a list of all the medicines, herbs, non-prescription drugs, or dietary supplements you use. Also tell them if you smoke, drink alcohol, or use illegal drugs. Some items may interact with your medicine.  What should I watch for while using this medicine?  Use iron supplements only as directed by your health care professional. You will need important blood work while you are taking this medicine. It may take 3 to 6 months of therapy to treat low iron levels. Pregnant women should follow the dose and length of iron treatment as  directed by their doctors.  Do not use iron longer than prescribed, and do not take a higher dose than recommended. Long-term use may cause excess iron to build-up in the body.  Do not take iron with antacids. If you need to take an antacid, take it 2 hours after a dose of iron.  What side effects may I notice from receiving this medicine?  Side effects that you should report to your doctor or health care professional as soon as possible:  · allergic reactions like skin rash, itching or hives, swelling of the face, lips, or tongue  · blue lips, nails, or palms  · dark colored stools (this may be due to the iron, but can indicate a more serious condition)  · drowsiness  · pain with or difficulty swallowing  · pale or clammy skin  · seizures  · stomach pain  · unusually weak or tired  · vomiting  · weak, fast, or irregular heartbeat  Side effects that usually do not require medical attention (report to your doctor or health care professional if they continue or are bothersome):  · constipation  · indigestion  · nausea or stomach upset  This list may not describe all possible side effects. Call your doctor for medical advice about side effects. You may report side effects to FDA at 1-320-FDA-7501.  Where should I keep my medicine?  Keep out of the reach of children. Even small amounts of iron can be harmful to a child.  Store at room temperature between 15 and 30 degrees C (59 and 86 degrees F). Keep container tightly closed. Throw away any unused medicine after the expiration date.  NOTE: This sheet is a summary. It may not cover all possible information. If you have questions about this medicine, talk to your doctor, pharmacist, or health care provider.  © 2020 Elsevier/Gold Standard (2009-05-05 17:03:41)    Iron Deficiency Anemia, Adult  Iron-deficiency anemia is when you have a low amount of red blood cells or hemoglobin. This happens because you have too little iron in your body. Hemoglobin carries oxygen to parts  of the body. Anemia can cause your body to not get enough oxygen. It may or may not cause symptoms.  Follow these instructions at home:  Medicines  · Take over-the-counter and prescription medicines only as told by your doctor. This includes iron pills (supplements) and vitamins.  · If you cannot handle taking iron pills by mouth, ask your doctor about getting iron through:  ? A vein (intravenously).  ? A shot (injection) into a muscle.  · Take iron pills when your stomach is empty. If you cannot handle this, take them with food.  · Do not drink milk or take antacids at the same time as your iron pills.  · To prevent trouble pooping (constipation), eat fiber or take medicine (stool softener) as told by your doctor.  Eating and drinking    · Talk with your doctor before changing the foods you eat. He or she may tell you to eat foods that have a lot of iron, such as:  ? Liver.  ? Lowfat (lean) beef.  ? Breads and cereals that have iron added to them (fortified breads and cereals).  ? Eggs.  ? Dried fruit.  ? Dark green, leafy vegetables.  · Drink enough fluid to keep your pee (urine) clear or pale yellow.  · Eat fresh fruits and vegetables that are high in vitamin C. They help your body to use iron. Foods with a lot of vitamin C include:  ? Oranges.  ? Peppers.  ? Tomatoes.  ? Mangoes.  General instructions  · Return to your normal activities as told by your doctor. Ask your doctor what activities are safe for you.  · Keep yourself clean, and keep things clean around you (your surroundings). Anemia can make you get sick more easily.  · Keep all follow-up visits as told by your doctor. This is important.  Contact a doctor if:  · You feel sick to your stomach (nauseous).  · You throw up (vomit).  · You feel weak.  · You are sweating for no clear reason.  · You have trouble pooping, such as:  ? Pooping (having a bowel movement) less than 3 times a week.  ? Straining to poop.  ? Having poop that is hard, dry, or larger  "than normal.  ? Feeling full or bloated.  ? Pain in the lower belly.  ? Not feeling better after pooping.  Get help right away if:  · You pass out (faint). If this happens, do not drive yourself to the hospital. Call your local emergency services (911 in the [place]  · You have chest pain.  · You have shortness of breath that:  ? Is very bad.  ? Gets worse with physical activity.  · You have a fast heartbeat.  · You get light-headed when getting up from sitting or lying down.  This information is not intended to replace advice given to you by your health care provider. Make sure you discuss any questions you have with your health care provider.  Document Released: 01/20/2012 Document Revised: 11/30/2018 Document Reviewed: 09/06/2017  Elsevier Patient Education © 2020 [employer]          Bloody Stools  Bloody stools often mean that there is a problem in the digestive tract. Your caregiver may use the term \"melena\" to describe black, tarry, and bad smelling stools or \"hematochezia\" to describe red or maroon-colored stools. Blood seen in the stool can be caused by bleeding anywhere along the intestinal tract.   A black stool usually means that blood is coming from the upper part of the gastrointestinal tract (esophagus, stomach, or small bowel). Passing maroon-colored stools or bright red blood usually means that blood is coming from lower down in the large bowel or the rectum. However, sometimes massive bleeding in the stomach or small intestine can cause bright red bloody stools.   Consuming black licorice, lead, iron pills, medicines containing bismuth subsalicylate, or blueberries can also cause black stools. Your caregiver can test black stools to see if blood is present.  It is important that the cause of the bleeding be found. Treatment can then be started, and the problem can be corrected. Rectal bleeding may not be serious, but you should not assume everything is okay until you know the cause. It is very " important to follow up with your caregiver or a specialist in gastrointestinal problems.  CAUSES   Blood in the stools can come from various underlying causes. Often, the cause is not found during your first visit. Testing is often needed to discover the cause of bleeding in the gastrointestinal tract. Causes range from simple to serious or even life-threatening. Possible causes include:  · Hemorrhoids. These are veins that are full of blood (engorged) in the rectum. They cause pain, inflammation, and may bleed.  · Anal fissures. These are areas of painful tearing which may bleed. They are often caused by passing hard stool.  · Diverticulosis. These are pouches that form on the colon over time, with age, and may bleed significantly.  · Diverticulitis. This is inflammation in areas with diverticulosis. It can cause pain, fever, and bloody stools, although bleeding is rare.  · Proctitis and colitis. These are inflamed areas of the rectum or colon. They may cause pain, fever, and bloody stools.  · Polyps and cancer. Colon cancer is a leading cause of preventable cancer death. It often starts out as precancerous polyps that can be removed during a colonoscopy, preventing progression into cancer. Sometimes, polyps and cancer may cause rectal bleeding.  · Gastritis and ulcers. Bleeding from the upper gastrointestinal tract (near the stomach) may travel through the intestines and produce black, sometimes tarry, often bad smelling stools. In certain cases, if the bleeding is fast enough, the stools may not be black, but red and the condition may be life-threatening.  SYMPTOMS   You may have stools that are bright red and bloody, that are normal color with blood on them, or that are dark black and tarry. In some cases, you may only have blood in the toilet bowl. Any of these cases need medical care. You may also have:  · Pain at the anus or anywhere in the rectum.  · Lightheadedness or feeling faint.  · Extreme  weakness.  · Nausea or vomiting.  · Fever.  DIAGNOSIS  Your caregiver may use the following methods to find the cause of your bleeding:  · Taking a medical history. Age is important. Older people tend to develop polyps and cancer more often. If there is anal pain and a hard, large stool associated with bleeding, a tear of the anus may be the cause. If blood drips into the toilet after a bowel movement, bleeding hemorrhoids may be the problem. The color and frequency of the bleeding are additional considerations. In most cases, the medical history provides clues, but seldom the final answer.  · A visual and finger (digital) exam. Your caregiver will inspect the anal area, looking for tears and hemorrhoids. A finger exam can provide information when there is tenderness or a growth inside. In men, the prostate is also examined.  · Endoscopy. Several types of small, long scopes (endoscopes) are used to view the colon.  · In the office, your caregiver may use a rigid, or more commonly, a flexible viewing sigmoidoscope. This exam is called flexible sigmoidoscopy. It is performed in 5 to 10 minutes.  · A more thorough exam is accomplished with a colonoscope. It allows your caregiver to view the entire 5 to 6 foot long colon. Medicine to help you relax (sedative) is usually given for this exam. Frequently, a bleeding lesion may be present beyond the reach of the sigmoidoscope. So, a colonoscopy may be the best exam to start with. Both exams are usually done on an outpatient basis. This means the patient does not stay overnight in the hospital or surgery center.  · An upper endoscopy may be needed to examine your stomach. Sedation is used and a flexible endoscope is put in your mouth, down to your stomach.  · A barium enema X-ray. This is an X-ray exam. It uses liquid barium inserted by enema into the rectum. This test alone may not identify an actual bleeding point. X-rays highlight abnormal shadows, such as those made by  lumps (tumors), diverticuli, or colitis.  TREATMENT   Treatment depends on the cause of your bleeding.   · For bleeding from the stomach or colon, the caregiver doing your endoscopy or colonoscopy may be able to stop the bleeding as part of the procedure.  · Inflammation or infection of the colon can be treated with medicines.  · Many rectal problems can be treated with creams, suppositories, or warm baths.  · Surgery is sometimes needed.  · Blood transfusions are sometimes needed if you have lost a lot of blood.  · For any bleeding problem, let your caregiver know if you take aspirin or other blood thinners regularly.  HOME CARE INSTRUCTIONS   · Take any medicines exactly as prescribed.  · Keep your stools soft by eating a diet high in fiber. Prunes (1 to 3 a day) work well for many people.  · Drink enough water and fluids to keep your urine clear or pale yellow.  · Take sitz baths if advised. A sitz bath is when you sit in a bathtub with warm water for 10 to 15 minutes to soak, soothe, and cleanse the rectal area.  · If enemas or suppositories are advised, be sure you know how to use them. Tell your caregiver if you have problems with this.  · Monitor your bowel movements to look for signs of improvement or worsening.  SEEK MEDICAL CARE IF:   · You do not improve in the time expected.  · Your condition worsens after initial improvement.  · You develop any new symptoms.  SEEK IMMEDIATE MEDICAL CARE IF:   · You develop severe or prolonged rectal bleeding.  · You vomit blood.  · You feel weak or faint.  · You have a fever.  MAKE SURE YOU:  · Understand these instructions.  · Will watch your condition.  · Will get help right away if you are not doing well or get worse.  Document Released: 12/08/2003 Document Revised: 03/11/2013 Document Reviewed: 05/04/2012  Footnote® Patient Information ©2014 [employer].      Lower Gastrointestinal Bleeding    Lower gastrointestinal (GI) bleeding is the result of bleeding from the  colon, rectum, or anal area. The colon is the last part of the digestive tract, where stool, also called feces, is formed. If you have lower GI bleeding, you may see blood in or on your stool. It may be bright red.  Lower GI bleeding often stops without treatment. Continued or heavy bleeding needs emergency treatment at the hospital.  What are the causes?  Lower GI bleeding may be caused by:  · A condition that causes pouches to form in the colon over time (diverticulosis).  · Swelling and irritation (inflammation) in areas with diverticulosis (diverticulitis).  · Inflammation of the colon (inflammatory bowel disease).  · Swollen veins in the rectum (hemorrhoids).  · Painful tears in the anus (anal fissures), often caused by passing hard stools.  · Cancer of the colon or rectum.  · Noncancerous growths (polyps) of the colon or rectum.  · A bleeding disorder that impairs the formation of blood clots and causes easy bleeding (coagulopathy).  · An abnormal weakening of a blood vessel where an artery and a vein come together (arteriovenous malformation).  What increases the risk?  You are more likely to develop this condition if:  · You are older than 60 years of age.  · You take aspirin or NSAIDs on a regular basis.  · You take anticoagulant or antiplatelet drugs.  · You have a history of high-dose X-ray treatment (radiation therapy) of the colon.  · You recently had a colon polyp removed.  What are the signs or symptoms?  Symptoms of this condition include:  · Bright red blood or blood clots coming from your rectum.  · Bloody stools.  · Black or maroon-colored stools.  · Pain or cramping in the abdomen.  · Weakness or dizziness.  · Racing heartbeat.  How is this diagnosed?  This condition may be diagnosed based on:  · Your symptoms and medical history.  · A physical exam. During the exam, your health care provider will check for signs of blood loss, such as low blood pressure and a rapid pulse.  · Tests, such  as:  ? Flexible sigmoidoscopy. In this procedure, a flexible tube with a camera on the end is used to examine your anus and the first part of your colon to look for the source of bleeding.  ? Colonoscopy. This is similar to a flexible sigmoidoscopy, but the camera can extend all the way to the uppermost part of your colon.  ? Blood tests to measure your red blood cell count and to check for coagulopathy.  ? An imaging study of your colon to look for a bleeding site. In some cases, you may have X-rays taken after a dye or radioactive substance is injected into your bloodstream (angiogram).  How is this treated?  Treatment for this condition depends on the cause of the bleeding. Heavy or persistent bleeding is treated at the hospital. Treatment may include:  · Getting fluids through an IV tube inserted into one of your veins.  · Getting blood through an IV tube (blood transfusion).  · Stopping bleeding through high-heat coagulation, injections of certain medicines, or applying surgical clips. This can all be done during a colonoscopy.  · Having a procedure that involves first doing an angiogram and then blocking blood flow to the bleeding site (embolization).  · Stopping some of your regular medicines for a certain amount of time.  · Having surgery to remove part of the colon. This may be needed if bleeding is severe and does not respond to other treatment.  Follow these instructions at home:  · Take over-the-counter and prescription medicines only as told by your health care provider. You may need to avoid aspirin, NSAIDs, or other medicines that increase bleeding.  · Eat foods that are high in fiber. This will help keep your stools soft. These foods include whole grains, legumes, fruits, and vegetables. Eating 1-3 prunes each day works well for many people.  · Drink enough fluid to keep your urine clear or pale yellow.  · Keep all follow-up visits as told by your health care provider. This is important.  Contact a  health care provider if:  · Your symptoms do not improve.  Get help right away if:  · Your bleeding increases.  · You feel light-headed or you faint.  · You feel weak.  · You have severe cramps in your back or abdomen.  · You pass large blood clots in your stool.  · Your symptoms get worse.  This information is not intended to replace advice given to you by your health care provider. Make sure you discuss any questions you have with your health care provider.      Document Released: 05/04/2017 Document Revised: 04/10/2020 Document Reviewed: 05/04/2017  Elsevier Patient Education © 2020 [employer]

## 2020-10-06 NOTE — DISCHARGE SUMMARY
Discharge Summary    CHIEF COMPLAINT ON ADMISSION  Chief Complaint   Patient presents with   • Weakness       Reason for Admission  Fatigue and dark stools    Admission Date  10/3/2020    CODE STATUS  DNAR/DNI    HPI & HOSPITAL COURSE  This is a 87 y.o. female  PMH A-fib, no AC, Mitral valve clip, hypothyroid, Vit D def, DLD presented 10/3/2020 for fatigue.  Patient was also mentioning melena, but she had been taking iron pills recently.  Patient does mention she has a long history of GI issues, having multiple scopes in the past in different states.  Patient was also found to have atrial fibrillation, not currently on anticoagulation as this is been stopped.  Her INR was subtherapeutic on arrival.  Patient has a history of mitral valve clipping.  Upon speaking with the patient, the she was scheduled for cardiac ablation by Dr. Rojo, which she was not able to complete.  In the hospital patient was started on IV heparin for A. fib which is subsequently held for EGD [place] and colonoscopy.  Patient had EGD scope and colonoscopy  done by gastroenterology  reported as below.  A cecal polyp was removed and sent for pathology.  No other obvious source of GI bleeding was identified.  Anticoagulation was held in view of risk of bleeding.  GI recommended to follow-up with digestive [facility] ([phone]).  Patient needs to follow-up with PCP for CT scan of the abdomen/pelvis and CEA level.  GI further recommended to consider capsule endoscopy once pathology findings are obtained.  Iron studies show iron saturation of 5% patient received IV iron, patient to be discharged on oral iron supplementation.  Patient urine analysis was suggestive of urinary tract infection, urine culture grew E. coli, patient did not report any symptoms, no tenderness to his mild tapping on bilateral flank.  Patient received IV ceftriaxone.  Patient was evaluated by cardiology for severe mitral regurgitation status post unsuccessful MitraClip  procedure in 2018.  Cardiology to consider watchman procedure if patient unable to tolerate anticoagulation due to GI bleeding.  Patient moving to [place], [place], patient was instructed to follow-up with PCP for further work-up and to arrange for follow-up with gastroenterology and cardiology.  Patient verbalized understanding.  Patient to be discharged on oral iron and antibiotics for UTI.       Therefore, she is discharged in good and stable condition to home with close outpatient follow-up.    The patient met 2-midnight criteria for an inpatient stay at the time of discharge.    Discharge Date  10/06/20    FOLLOW UP ITEMS POST DISCHARGE  F/u with Gastroenterology( [facility] - [phone]  F/u with Cardiology (for possible Watchman procedure vs restarting anticoagulation)    DISCHARGE DIAGNOSES  Principal Problem:    Gastrointestinal hemorrhage with melena POA: Yes  Active Problems:    AF (atrial fibrillation) (HCC) (Chronic) POA: Yes    CKD (chronic kidney disease) (Chronic) POA: Yes    Severe mitral valve regurgitation (Chronic) POA: Yes    Malnutrition (HCC) POA: Unknown    Iron deficiency anemia secondary to inadequate dietary iron intake POA: Unknown  Resolved Problems:    Caregiver burden POA: Unknown      FOLLOW UP  No future appointments.  F/U with Gastroenterology and Cardiology at [place], [place]  PCP    Schedule an appointment as soon as possible for a visit in 3 days  Obtain PCP in [place], [place]. F/U hospital visit, get polyp biopsy results from [facility], check iron panel likely need IV iron poor GI absorber, referral for GI and Cardiology. Restart anticoagulation if bleeding risk is low.      MEDICATIONS ON DISCHARGE     Medication List      START taking these medications      Instructions   famotidine 10 MG tablet  Start taking on: October 7, 2020  Commonly known as: PEPCID   Take 1 Tab by mouth every day.  Dose: 10 mg     ferrous sulfate 325 (65 Fe) MG tablet   Take 1 Tab by mouth every  day.  Dose: 325 mg        CONTINUE taking these medications      Instructions   CALCIUM PO   Take 1 Tab by mouth every morning.  Dose: 1 Tab     diltiazem 240 MG SR capsule  Commonly known as: TIAZAC   Take 240 mg by mouth every morning.  Dose: 240 mg     folic acid 1 MG Tabs  Commonly known as: FOLVITE   Take 1 mg by mouth every day.  Dose: 1 mg     furosemide 40 MG Tabs  Commonly known as: LASIX   Take 40 mg by mouth every day.  Dose: 40 mg     levothyroxine 75 MCG Tabs  Commonly known as: SYNTHROID   Take 75 mcg by mouth every morning.  Dose: 75 mcg     MAGNESIUM PO   Take 1 Tab by mouth every day.  Dose: 1 Tab     NON SPECIFIED   Take 1 Tab by mouth every 48 hours. Unknown Eye Vitamin  Dose: 1 Tab     spironolactone 25 MG Tabs  Commonly known as: ALDACTONE   Take 25 mg by mouth every day.  Dose: 25 mg     VITAMIN B-12 PO   Take 1 Tab by mouth every day.  Dose: 1 Tab     VITAMIN D PO   Take 1 Tab by mouth every morning.  Dose: 1 Tab        STOP taking these medications    pantoprazole 40 MG Tbec  Commonly known as: PROTONIX     warfarin 4 MG Tabs  Commonly known as: COUMADIN            Allergies  Allergies   Allergen Reactions   • Scallops Hives and Vomiting     hives       DIET  Orders Placed This Encounter   Procedures   • Diet Order Cardiac     Standing Status:   Standing     Number of Occurrences:   1     Order Specific Question:   Diet:     Answer:   Cardiac [6]       ACTIVITY  As tolerated.  Weight bearing as tolerated    CONSULTATIONS  Gastroenterology   Cardiology    PROCEDURES  EGD and Colonoscopy  LABORATORY  Lab Results   Component Value Date    SODIUM 132 (L) 10/06/2020    POTASSIUM 3.9 10/06/2020    CHLORIDE 100 10/06/2020    CO2 18 (L) 10/06/2020    GLUCOSE 144 (H) 10/06/2020    BUN 30 (H) 10/06/2020    CREATININE 1.32 10/06/2020        Lab Results   Component Value Date    WBC 6.6 10/06/2020    HEMOGLOBIN 8.2 (L) 10/06/2020    HEMATOCRIT 27.6 (L) 10/06/2020    PLATELETCT 198 10/06/2020        Total  time of the discharge process exceeds 30 minutes.

## 2020-10-06 NOTE — PROGRESS NOTES
Cardiology Follow Up Progress Note    Date of Service  10/6/2020    Attending Physician  Emerson Sal M.D.    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 Of note, patient was being evaluated by [facility] for open heart surgery for surgical intervention of her valvular disease, she was scheduled to undergo cardiac catheterization here with Dr. Génesis stacy.  However she came into the hospital because of feeling fatigue and found to have melena.  Patient denies having chest pain.     Interim Events  10/5: No cardiac issues or symptoms.  10/6: Tolerated endoscopy procedures yesterday.  No cardiac symptoms.    Review of Systems  Review of Systems   Respiratory: Negative for chest tightness and shortness of breath.    Cardiovascular: Negative for chest pain and palpitations.   Gastrointestinal: Negative for abdominal pain and nausea.   Neurological: Negative for dizziness and headaches.       Vital signs in last 24 hours  Temp:  [35.8 °C (96.5 °F)-36.8 °C (98.2 °F)] 36.7 °C (98.1 °F)  Pulse:  [68-74] 74  Resp:  [16-18] 16  BP: ()/(46-68) 122/68  SpO2:  [90 %-95 %] 92 %    Physical Exam  Physical Exam  Constitutional:       General: She is not in acute distress.  HENT:      Head: Normocephalic.   Eyes:      General: No scleral icterus.     Extraocular Movements: Extraocular movements intact.   Cardiovascular:      Rate and Rhythm: Normal rate and regular rhythm.      Heart sounds: S1 normal and S2 normal. Murmur present. No friction rub. No gallop.     Pulmonary:      Effort: Pulmonary effort is normal.      Breath sounds: Normal breath sounds. No wheezing, rhonchi or rales.   Chest:      Comments: PPM generator  Musculoskeletal:      Right lower leg: No edema.      Left lower leg: No edema.   Skin:     General: Skin is warm and dry.   Neurological:      Mental Status: She is alert and oriented to person, place, and time.   Psychiatric:         Behavior: Behavior normal.       Lab Review  Lab Results   Component Value Date/Time    WBC 6.6 10/06/2020 04:20 AM    RBC 3.49 (L) 10/06/2020 04:20 AM    HEMOGLOBIN 8.2 (L) 10/06/2020 04:20 AM    HEMATOCRIT 27.6 (L) 10/06/2020 04:20 AM    MCV 79.1 (L) 10/06/2020 04:20 AM    MCH 23.5 (L) 10/06/2020 04:20 AM    MCHC 29.7 (L) 10/06/2020 04:20 AM    MPV 11.0 10/06/2020 04:20 AM      Lab Results   Component Value Date/Time    SODIUM 132 (L) 10/06/2020 04:20 AM    POTASSIUM 3.9 10/06/2020 04:20 AM    CHLORIDE 100 10/06/2020 04:20 AM    CO2 18 (L) 10/06/2020 04:20 AM    GLUCOSE 144 (H) 10/06/2020 04:20 AM    BUN 30 (H) 10/06/2020 04:20 AM    CREATININE 1.32 10/06/2020 04:20 AM    BUNCREATRAT 18 07/27/2020 10:33 AM      Lab Results   Component Value Date/Time    ASTSGOT 28 09/30/2020 11:44 AM    ALTSGPT 14 09/30/2020 11:44 AM     Lab Results   Component Value Date/Time    TROPONINT 35 (H) 10/03/2020 09:55 PM       No results for input(s): NTPROBNP in the last 72 hours.    Cardiac Imaging and Procedures Review  Rhythm:  My personal interpretation of the rhythm dated 10/6/2020 is atrial fibrillation. V paced.    Echocardiogram:  10/04/2020  Compared to the report of the study done - 07/31/2020 there has been no   change.  Mildly reduced left ventricular systolic function. Left ventricular   ejection fraction is visually estimated to be 45%-50%.   Known transcatheter mitral valve repair which is functioning normally   with appropriate transvalvular gradient.   Mean transvalvular gradient is 4.0 mmHg at a heart rate of 66 BPM.    Severe mitral regurgitation.    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  Previous unsuccessful MitraClip procedure, 2018 [facility]  Permanent pacemaker  Chronic atrial fibrillation  Anticoagulation with warfarin, chronic, discontinued due to GI bleed.  GI bleed  Anemia severe  CKD  General debility  Hypothyroidism.  B12 deficiency.    Recommendation Discussion  The patient is stable from a cardiac standpoint with no evidence of heart failure or arrhythmia.  Hemoglobin improved with IV iron.  Timing for reinitiation of anticoagulation per GI recommendation if felt to be contraindicated would consider Watchman procedure.  Patient informs me that she plans to be moved to [place], [place] next Monday.  Was to be evaluated at [facility] regarding mitral valve procedure or surgery currently on hold.  No additional cardiac recommendations  We will sign off.    Thank you for allowing me to participate in the care of this patient.    Please contact me with any questions.    Randy Estrella M.D.   Cardiologist, [facility]  [phone]

## 2020-10-06 NOTE — PROGRESS NOTES
Received bedside report from Nagi Pena RN. Pt alert and awake, no distress noted. Denies any pain. Instructed pt to call for any assistance. Call light within reach.

## 2020-10-06 NOTE — CARE PLAN
Problem: Communication  Goal: The ability to communicate needs accurately and effectively will improve  Outcome: PROGRESSING AS EXPECTED     Problem: Safety  Goal: Will remain free from injury  Outcome: PROGRESSING AS EXPECTED  Goal: Will remain free from falls  Outcome: PROGRESSING AS EXPECTED     Problem: Infection  Goal: Will remain free from infection  Outcome: PROGRESSING AS EXPECTED     Problem: Venous Thromboembolism (VTW)/Deep Vein Thrombosis (DVT) Prevention:  Goal: Patient will participate in Venous Thrombosis (VTE)/Deep Vein Thrombosis (DVT)Prevention Measures  Outcome: PROGRESSING AS EXPECTED     Problem: Bowel/Gastric:  Goal: Normal bowel function is maintained or improved  Outcome: PROGRESSING AS EXPECTED  Goal: Will not experience complications related to bowel motility  Outcome: PROGRESSING AS EXPECTED     Problem: Knowledge Deficit  Goal: Knowledge of disease process/condition, treatment plan, diagnostic tests, and medications will improve  Outcome: PROGRESSING AS EXPECTED  Goal: Knowledge of the prescribed therapeutic regimen will improve  Outcome: PROGRESSING AS EXPECTED     Problem: Discharge Barriers/Planning  Goal: Patient's continuum of care needs will be met  Outcome: PROGRESSING AS EXPECTED     Problem: Skin Integrity  Goal: Risk for impaired skin integrity will decrease  Outcome: PROGRESSING AS EXPECTED

## 2020-10-07 NOTE — ED NOTES
Pt provided discharge instructions. Pt verbalized understanding. Pt leaving ER in stable condition,      Wrapped pts legs and dressed it

## 2020-10-07 NOTE — ED TRIAGE NOTES
Pt to ED triage with complaints of GLF on an escalator. She has lacerations and abrasions to left lower leg. Small hematoma to left scalp and low back pain. She was just released from hospital today. She is NOT on blood thinners.     Charge RN notified for TBI alert.

## 2020-10-07 NOTE — ED PROVIDER NOTES
ED Provider Note      ER PROVIDER NOTE      CHIEF COMPLAINT  Chief Complaint   Patient presents with   • T-5000 FALL   • Back Pain   • Laceration       HPI  Lindsay Landrum is a 87 y.o. female who presents to the emergency department complaining of mechanical fall.  Patient reports she was getting on an escalator earlier riding it up when she was unable to hold on and fell forward on the escalator scraping her leg.  She did hit her head but denies any headache or LOC.  No neck pain.  No focal weakness numbness or tingling, no nausea or vomiting.  States no other injury and does not take any blood thinners    REVIEW OF SYSTEMS  Pertinent positives include all, leg pain. Pertinent negatives include no headache. See HPI for details. All other systems reviewed and are negative.    PAST MEDICAL HISTORY   has a past medical history of AF (atrial fibrillation) ([facility]) (5/21/2012), Anemia, Bowel habit changes, Breath shortness, Cataract, Chronic anticoagulation (5/21/2012), Heart burn, Heart valve disease, Hemorrhagic disorder ([facility]), High cholesterol, History of blood transfusion, Hyperlipidemia (5/21/2012), Hypothyroidism (5/21/2012), Indigestion, Pacemaker, Urinary incontinence, and Vitamin d deficiency (5/21/2012).    SURGICAL HISTORY   has a past surgical history that includes gyn surgery; other cardiac surgery; cataract extraction with iol; abdominal hysterectomy total; colonoscopy,diagnostic (N/A, 10/5/2020); gastroscopy (N/A, 10/5/2020); and colonoscopy with polyp (N/A, 10/5/2020).    FAMILY HISTORY  Family History   Problem Relation Age of Onset   • No Known Problems Mother    • Heart Disease Father    • Hypertension Father    • Lupus Sister        SOCIAL HISTORY  Social History     Socioeconomic History   • Marital status: Single     Spouse name: Not on file   • Number of children: Not on file   • Years of education: Not on file   • Highest education level: Not on file   Occupational History   • Not on file   Social  Needs   • Financial resource strain: Not on file   • Food insecurity     Worry: Not on file     Inability: Not on file   • Transportation needs     Medical: Not on file     Non-medical: Not on file   Tobacco Use   • Smoking status: Never Smoker   • Smokeless tobacco: Never Used   Substance and Sexual Activity   • Alcohol use: Yes     Frequency: 4 or more times a week     Drinks per session: 1 or 2   • Drug use: No   • Sexual activity: Not on file   Lifestyle   • Physical activity     Days per week: Not on file     Minutes per session: Not on file   • Stress: Not on file   Relationships   • Social connections     Talks on phone: Not on file     Gets together: Not on file     Attends [employer] service: Not on file     Active member of club or organization: Not on file     Attends meetings of clubs or organizations: Not on file     Relationship status: Not on file   • Intimate partner violence     Fear of current or ex partner: Not on file     Emotionally abused: Not on file     Physically abused: Not on file     Forced sexual activity: Not on file   Other Topics Concern   • Not on file   Social History Narrative   • Not on file      Social History     Substance and Sexual Activity   Drug Use No       CURRENT MEDICATIONS  Home Medications     Reviewed by Lucinda Givens R.N. (Registered Nurse) on 10/06/20 at 2021  Med List Status: Partial   Medication Last Dose Status   CALCIUM PO  Active   Cyanocobalamin (VITAMIN B-12 PO)  Active   diltiazem (TIAZAC) 240 MG SR capsule  Active   famotidine (PEPCID) 10 MG tablet  Active   ferrous sulfate 325 (65 Fe) MG tablet  Active   folic acid (FOLVITE) 1 MG Tab  Active   furosemide (LASIX) 40 MG Tab  Active   levothyroxine (SYNTHROID) 75 MCG Tab  Active   MAGNESIUM PO  Active   NON SPECIFIED  Active   spironolactone (ALDACTONE) 25 MG Tab  Active   VITAMIN D PO  Active                ALLERGIES  No Known Allergies    PHYSICAL EXAM  VITAL SIGNS: /87   Temp 36 °C (96.8 °F)  "(Temporal)   Resp 18   Ht 1.575 m (5' 2\")   Wt 49 kg (108 lb)   BMI 19.75 kg/m²   Pulse ox interpretation: I interpret this pulse ox as normal.    Constitutional: Alert in no apparent distress.  HENT: No signs of trauma, no bottles, no raccoons, bilateral external ears normal, Nose normal.   Eyes: Pupils are equal and reactive, Conjunctiva normal, Non-icteric.   Neck: Normal range of motion, No tenderness, Supple, No stridor.   Lymphatic: No lymphadenopathy noted.   Cardiovascular: Regular rate and rhythm, no murmurs.   Thorax & Lungs: Normal breath sounds, No respiratory distress, No wheezing, No chest tenderness.   Abdomen: Bowel sounds normal, Soft, No tenderness, No masses, No pulsatile masses. No peritoneal signs.  Skin: Warm, Dry, No erythema, No rash.   Back: No bony tenderness, No CVA tenderness.   Extremities: Several areas of superficial skin tear to the anterior shin on the left, these appear to be superficial without any gaping wounds or need for sutures intact distal pulses, No edema, No tenderness, No cyanosis, Negative Stanton's sign.  Musculoskeletal: Good range of motion in all major joints. No tenderness to palpation or major deformities noted.   Neurologic: Alert , Normal motor function, Normal sensory function, No focal deficits noted.   Psychiatric: Affect normal, Judgment normal, Mood normal.     DIAGNOSTIC STUDIES / PROCEDURES        RADIOLOGY  DX-LUMBAR SPINE-2 OR 3 VIEWS   Final Result      No acute fracture or traumatic malalignment.      DX-TIBIA AND FIBULA LEFT   Final Result      No radiographic evidence of acute traumatic injury.      CT-HEAD W/O   Final Result      1.  No acute intracranial abnormality.   2.  Age-consistent atrophy.               INTERPRETING LOCATION:  [address] [place] [address], [facility]        The radiologist's interpretation of all radiological studies have been reviewed and images independently viewed by me.    COURSE & MEDICAL DECISION MAKING  Nursing notes, VS, PMSFHx " reviewed in chart.    8:40 PM Patient seen and examined at triage per TBI protocol, she will proceed to CT head       9:24 PM  Patient is reevaluated on return from CT.  On examination she does have some mild tenderness to the L-spine without any deformity or step-off, and dressed the wound on her leg, she does have several areas of skin tear but no suturable laceration.  X-rays ordered, wound care, declines pain medication    10:27 PM  Patient reevaluated updated on all results, plan for discharge    Tetanus is up-to-date      this patient was cared for during the COVID-19 pandemic.  History and physical exam may be limited/truncated by the inherent challenges of PPE and the need to decrease staff exposure to novel coronavirus.  Some aspects of disease management may be different to protect staff and help slow the spread of disease. I verified that, if possible, the patient was wearing a mask and I was wearing appropriate PPE every time I encountered the patient.     Current [facility] COVID19 protocol followed        Decision Making:  This is a 87 y.o. female presenting with a skin tear after a mechanical fall on an escalator.  She did meet our TBI criteria her head CT is negative she is not on blood thinners I am not suspicious of delayed bleed.  Additionally had some mild low back pain that seem to be more muscular nature she had no bony tenderness, x-rays were obtained with her age and these are negative.  Occult fracture seems less likely as she has no bony tenderness as well.  She did have a skin tear of her leg wound care was provided and not need suturing, her tetanus is up-to-date     The patient will return for new or worsening symptoms and is stable at the time of discharge.    The patient is referred to a primary physician for blood pressure management, diabetic screening, and for all other preventative health concerns.        DISPOSITION:  Patient will be discharged home in stable condition.    FOLLOW  UP:  With your primary care doctor            OUTPATIENT MEDICATIONS:  New Prescriptions    No medications on file         FINAL IMPRESSION  1. Fall, initial encounter    2. Skin tear of left lower leg without complication, initial encounter         The note accurately reflects work and decisions made by me.  Gerald Meza M.D.  10/6/2020  10:28 PM

## 2020-10-16 ENCOUNTER — TELEPHONE (OUTPATIENT)
Dept: CARDIOLOGY | Facility: MEDICAL CENTER | Age: 85
End: 2020-10-16

## 2020-10-16 NOTE — TELEPHONE ENCOUNTER
Received clearance request from [facility] for pt to stop coumadin prior to a EGD and colonoscopy.     After chart review, pt should not be on coumadin currently, and may no longer be in town. She was apparently supposed to be moving to [place] closer to family.     Contacted pt, she confirmed that has moved to [place] and is not planning on having additional work-up or care in [place] or with [facility]. Pt asked about obtaining medical records and this was discussed. Pt given contact number to medical records department. Pt will continue to receive care in [place].     Clearance sent back to GI with this information

## 2020-11-11 ENCOUNTER — TELEPHONE (OUTPATIENT)
Dept: CARDIOLOGY | Facility: MEDICAL CENTER | Age: 85
End: 2020-11-11

## 2020-11-11 NOTE — TELEPHONE ENCOUNTER
Returned Courtney (from [facility]) call and explained that pt was contacted on 10/16 and confirmed that she moved back to [place]. Also provided information for Jess regarding a note in the chart from 10/30 (through [facility]) from Dr. Grant Begum at [facility] [place] which states that they will be proceeding with [facility] to complete AVR workup. Jess appreciated and will continue to reach out to attempt to reach out to the patient.

## 2020-11-11 NOTE — TELEPHONE ENCOUNTER
Received a call from Jess from [facility] [phone] wanting info on [facility], hasnt been completed yet. Also wanted records, gave records number to get records.

## 2021-01-14 DIAGNOSIS — Z23 NEED FOR VACCINATION: ICD-10-CM

## 2021-01-28 ENCOUNTER — TELEPHONE (OUTPATIENT)
Dept: CARDIOLOGY | Facility: MEDICAL CENTER | Age: 86
End: 2021-01-28

## 2021-01-28 NOTE — TELEPHONE ENCOUNTER
Received call from Lesley at [facility] requesting images of most recent echo and carotid so they can proceed with cardiothoracic surgery work up. Lesley also asked for a GI clearance for the upcoming cardiac procedures. Reviewed chart and explained that in October pt stated she was going to follow up with GI in [place] and not here in [place] an advised to contact the pt for more information.     Contacted outside imaging office and the images will be pushed through the  to [facility] today.

## 2021-07-19 ENCOUNTER — OFFICE VISIT (OUTPATIENT)
Dept: CARDIOLOGY | Facility: MEDICAL CENTER | Age: 86
End: 2021-07-19
Payer: MEDICARE

## 2021-07-19 VITALS
OXYGEN SATURATION: 98 % | BODY MASS INDEX: 20.24 KG/M2 | HEART RATE: 70 BPM | HEIGHT: 62 IN | SYSTOLIC BLOOD PRESSURE: 126 MMHG | WEIGHT: 110 LBS | DIASTOLIC BLOOD PRESSURE: 68 MMHG

## 2021-07-19 DIAGNOSIS — I48.11 LONGSTANDING PERSISTENT ATRIAL FIBRILLATION (HCC): ICD-10-CM

## 2021-07-19 LAB — EKG IMPRESSION: NORMAL

## 2021-07-19 PROCEDURE — 93279 PRGRMG DEV EVAL PM/LDLS PM: CPT | Performed by: INTERNAL MEDICINE

## 2021-07-19 PROCEDURE — 93000 ELECTROCARDIOGRAM COMPLETE: CPT | Mod: 59 | Performed by: INTERNAL MEDICINE

## 2021-07-19 PROCEDURE — 99214 OFFICE O/P EST MOD 30 MIN: CPT | Mod: 25 | Performed by: INTERNAL MEDICINE

## 2021-07-19 ASSESSMENT — FIBROSIS 4 INDEX: FIB4 SCORE: 3.33

## 2021-07-19 NOTE — PROGRESS NOTES
Arrhythmia Clinic Note (Established patient)    DOS: 7/19/2021    Chief complaint/Reason for consult: Afib    Interval History: 88-year-old female with permanent atrial fibrillation and complete heart block status post pacemaker implantation.  She initially had pacemaker implanted in her left groin with RV lead through the IVC.  This caused discomfort so last year I implanted a leadless Micra pacemaker and extracted the groin site pacemaker with the lead.  Due to a history of severe mitral vegetation, I transferred care to Dr. Capps for longitudinal management of severe valvular disease with history of mitral clip in 2018.  I have not followed her beyond that however she is presenting now for a follow-up visit, unfortunately rescheduled with me as opposed to with Dr. Capps, for valvular heart disease.    Since I last saw her and implanted her pacemaker, she had consultation with Dr. Capps regarding her severe mitral valve disease.  At that time she was evaluated by both cardiology valve clinic and cardiothoracic surgery, and was felt to not be a very good candidate for operative management.  Specifically, the lack of ability to perform TOM due to esophageal strictures was a concern.  She was scheduled for coronary angiogram with right and left heart cath in September but this was ultimately canceled due to concern for anemia.  She was referred to [facility].  She reports having had a virtual visit with them but she relocated to [facility].  She is now back in town.  She says she has shortness of breath and fatigue.  She cannot sleep more than 3 to 4 hours at night.  She denies palpitations.  She is interested in having surgery or some sort of procedure to improve her bowel function.    ROS (+ highlighted in bold):  Constitutional: Fevers/chills/fatigue/weightloss  HEENT: Blurry vision/eye pain/sore throat/hearing loss  Respiratory: Shortness of breath/cough  Cardiovascular: Chest  pain/palpitations/edema/orthopnea/syncope  GI: Nausea/vomitting/diarrhea  MSK: Arthralgias/myagias/muscle weakness  Skin: Rash/sores  Neurological: Numbness/tremors/vertigo  Endocrine: Excessive thirst/polyuria/cold intolerance/heat intolerance  Psych: Depression/anxiety    Past Medical History:   Diagnosis Date   • AF (atrial fibrillation) (HCC) 5/21/2012   • Anemia    • Bowel habit changes     takes miralax   • Breath shortness     with a fib   • Cataract     surgery   • Chronic anticoagulation 5/21/2012   • Heart burn    • Heart valve disease    • Hemorrhagic disorder (HCC)     transfusions x2   • High cholesterol    • History of blood transfusion    • Hyperlipidemia 5/21/2012   • Hypothyroidism 5/21/2012   • Indigestion    • Pacemaker    • Urinary incontinence     after procedure   • Vitamin d deficiency 5/21/2012       Past Surgical History:   Procedure Laterality Date   • PB COLONOSCOPY,DIAGNOSTIC N/A 10/5/2020    Procedure: COLONOSCOPY;  Surgeon: J Carlos Palacios M.D.;  Location: SURGERY SAME DAY [facility];  Service: Gastroenterology   • GASTROSCOPY N/A 10/5/2020    Procedure: GASTROSCOPY;  Surgeon: J Carlos Palacios M.D.;  Location: SURGERY SAME DAY [facility];  Service: Gastroenterology   • COLONOSCOPY WITH POLYP N/A 10/5/2020    Procedure: COLONOSCOPY, WITH POLYPECTOMY;  Surgeon: J Carlos Palacios M.D.;  Location: SURGERY SAME DAY [facility];  Service: Gastroenterology   • ABDOMINAL HYSTERECTOMY TOTAL     • CATARACT EXTRACTION WITH IOL     • GYN SURGERY      hysterectomy   • OTHER CARDIAC SURGERY      pacemaker       Social History     Socioeconomic History   • Marital status: Single     Spouse name: Not on file   • Number of children: Not on file   • Years of education: Not on file   • Highest education level: Not on file   Occupational History   • Not on file   Tobacco Use   • Smoking status: Never Smoker   • Smokeless tobacco: Never Used   Vaping Use   • Vaping Use: Never used   Substance and Sexual  Activity   • Alcohol use: Yes   • Drug use: No   • Sexual activity: Not on file   Other Topics Concern   • Not on file   Social History Narrative   • Not on file     Social Determinants of Health     Financial Resource Strain:    • Difficulty of Paying Living Expenses:    Food Insecurity:    • Worried About Running Out of Food in the Last Year:    • Ran Out of Food in the Last Year:    Transportation Needs:    • Lack of Transportation (Medical):    • Lack of Transportation (Non-Medical):    Physical Activity:    • Days of Exercise per Week:    • Minutes of Exercise per Session:    Stress:    • Feeling of Stress :    Social Connections:    • Frequency of Communication with Friends and Family:    • Frequency of Social Gatherings with Friends and Family:    • Attends [employer] Services:    • Active Member of Clubs or Organizations:    • Attends Club or Organization Meetings:    • Marital Status:    Intimate Partner Violence:    • Fear of Current or Ex-Partner:    • Emotionally Abused:    • Physically Abused:    • Sexually Abused:        Family History   Problem Relation Age of Onset   • No Known Problems Mother    • Heart Disease Father    • Hypertension Father    • Lupus Sister        No Known Allergies    Current Outpatient Medications   Medication Sig Dispense Refill   • ferrous sulfate 325 (65 Fe) MG tablet Take 1 Tab by mouth every day. 30 Tab 0   • levothyroxine (SYNTHROID) 75 MCG Tab Take 75 mcg by mouth every morning.     • VITAMIN D PO Take 1 Tab by mouth every morning.     • CALCIUM PO Take 1 Tab by mouth every morning.     • Cyanocobalamin (VITAMIN B-12 PO) Take 1 Tab by mouth every day.     • NON SPECIFIED Take 1 Tab by mouth every 48 hours. Unknown Eye Vitamin     • MAGNESIUM PO Take 1 Tab by mouth every day.     • diltiazem (TIAZAC) 240 MG SR capsule Take 240 mg by mouth every morning.     • folic acid (FOLVITE) 1 MG Tab Take 1 mg by mouth every day.     • spironolactone (ALDACTONE) 25 MG Tab Take 25 mg  "by mouth every day.     • furosemide (LASIX) 40 MG Tab Take 40 mg by mouth every day.     • famotidine (PEPCID) 10 MG tablet Take 1 Tab by mouth every day. (Patient not taking: Reported on 7/19/2021) 60 Tab 0     No current facility-administered medications for this visit.       Physical Exam:  Vitals:    07/19/21 1017   BP: 126/68   BP Location: Left arm   Patient Position: Sitting   BP Cuff Size: Adult   Pulse: 70   SpO2: 98%   Weight: 49.9 kg (110 lb)   Height: 1.575 m (5' 2\")     General appearance: NAD, conversant   Eyes: anicteric sclerae, moist conjunctivae; no lid-lag; PERRLA  HENT: Atraumatic; oropharynx clear with moist mucous membranes and no mucosal ulcerations; normal hard and soft palate  Neck: Trachea midline; FROM, supple, no thyromegaly or lymphadenopathy  Lungs: CTA, with normal respiratory effort and no intercostal retractions  CV: RRR, 4 out of 6 systolic murmur  Abdomen: Soft, non-tender; no masses or HSM  Extremities: No peripheral edema or extremity lymphadenopathy  Skin: Normal temperature, turgor and texture; no rash, ulcers or subcutaneous nodules  Psych: Appropriate affect, alert and oriented to person, place and time    Data:  Lipids:   No results found for: CHOLSTRLTOT, TRIGLYCERIDE, HDL, LDL     BMP:  Lab Results   Component Value Date/Time    SODIUM 132 (L) 10/06/2020 0420    POTASSIUM 3.9 10/06/2020 0420    CHLORIDE 100 10/06/2020 0420    CO2 18 (L) 10/06/2020 0420    GLUCOSE 144 (H) 10/06/2020 0420    BUN 30 (H) 10/06/2020 0420    CREATININE 1.32 10/06/2020 0420    CALCIUM 8.1 (L) 10/06/2020 0420    ANION 14.0 10/06/2020 0420        TSH:   No results found for: TSHULTRASEN     THYROXINE (T4):   No results found for: FREEDIR     CBC:   Lab Results   Component Value Date/Time    WBC 6.6 10/06/2020 04:20 AM    RBC 3.49 (L) 10/06/2020 04:20 AM    HEMOGLOBIN 8.2 (L) 10/06/2020 04:20 AM    HEMATOCRIT 27.6 (L) 10/06/2020 04:20 AM    MCV 79.1 (L) 10/06/2020 04:20 AM    MCH 23.5 (L) " 10/06/2020 04:20 AM    MCHC 29.7 (L) 10/06/2020 04:20 AM    RDW 56.2 (H) 10/06/2020 04:20 AM    PLATELETCT 198 10/06/2020 04:20 AM    MPV 11.0 10/06/2020 04:20 AM    NEUTSPOLYS 92.60 (H) 10/05/2020 02:20 PM    LYMPHOCYTES 4.30 (L) 10/05/2020 02:20 PM    MONOCYTES 1.90 10/05/2020 02:20 PM    EOSINOPHILS 0.00 10/05/2020 02:20 PM    BASOPHILS 0.40 10/05/2020 02:20 PM    IMMGRAN 0.80 10/05/2020 02:20 PM    NRBC 0.00 10/05/2020 02:20 PM    NEUTS 11.77 (H) 10/05/2020 02:20 PM    LYMPHS 0.55 (L) 10/05/2020 02:20 PM    MONOS 0.24 10/05/2020 02:20 PM    EOS 0.00 10/05/2020 02:20 PM    BASO 0.05 10/05/2020 02:20 PM    IMMGRANAB 0.10 10/05/2020 02:20 PM    NRBCAB 0.00 10/05/2020 02:20 PM        CBC w/o DIFF  Lab Results   Component Value Date/Time    WBC 6.6 10/06/2020 04:20 AM    RBC 3.49 (L) 10/06/2020 04:20 AM    HEMOGLOBIN 8.2 (L) 10/06/2020 04:20 AM    MCV 79.1 (L) 10/06/2020 04:20 AM    MCH 23.5 (L) 10/06/2020 04:20 AM    MCHC 29.7 (L) 10/06/2020 04:20 AM    RDW 56.2 (H) 10/06/2020 04:20 AM    MPV 11.0 10/06/2020 04:20 AM       Prior echo/stress reviewed: TOM results reviewed, unable to assess mitral valve due to esophageal stricture    Echo results reviewed, severe mitral regurgitation, EF 45 to 50%    EKG interpreted by me: Atrial fibrillation with ventricular pacing    Device technician interpreted by me: Normal device function    Impression/Plan:  1. Longstanding persistent atrial fibrillation (HCC)  EKG     1.  Permanent atrial fibrillation  2.  Complete heart block status post pacemaker implantation, extraction, followed by leadless pacemaker implantation  3.  Mildly depressed ejection fraction  4.  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  Unfortunately due to lack of upper extremity access, she is not a candidate for conventional biventricular endocardial pacing.  Given that her EF is only mildly  depressed at this point, I do not think that surgical epicardial LV lead placement benefits would outweigh risk.  -She is on lifelong Coumadin for her valvular atrial fibrillation  -With regards to her valvular heart disease, I am transferring care to valve clinic at this point.  Her case is complex.  She is initially referred to [facility] and sent for a coronary angiogram, however she became lost to follow-up.  If the issue of valvular intervention is one of TOM guidance, I do wonder if esophageal dilatation could be performed prior to TOM and allow for TOM probe to be passed.  Regardless, if the decision is made once again refer to [facility], she will likely still need right and left heart cath and thus I will defer to cardiology for scheduling arrangements.    I will discharge her from electrophysiology clinic and transfer care to valve clinic.  She can continue to follow-up with device clinic with remote and in person monitoring as scheduled.    Sukh Zamorano MD  Cardiac Electrophysiology

## 2021-07-29 ENCOUNTER — OFFICE VISIT (OUTPATIENT)
Dept: CARDIOLOGY | Facility: MEDICAL CENTER | Age: 86
End: 2021-07-29
Payer: MEDICARE

## 2021-07-29 VITALS
HEIGHT: 62 IN | DIASTOLIC BLOOD PRESSURE: 62 MMHG | OXYGEN SATURATION: 97 % | HEART RATE: 70 BPM | BODY MASS INDEX: 20.61 KG/M2 | WEIGHT: 112 LBS | RESPIRATION RATE: 12 BRPM | SYSTOLIC BLOOD PRESSURE: 124 MMHG

## 2021-07-29 DIAGNOSIS — I34.0 SEVERE MITRAL VALVE REGURGITATION: Chronic | ICD-10-CM

## 2021-07-29 DIAGNOSIS — I50.22 CHRONIC SYSTOLIC CONGESTIVE HEART FAILURE (HCC): ICD-10-CM

## 2021-07-29 DIAGNOSIS — I48.91 ATRIAL FIBRILLATION, UNSPECIFIED TYPE (HCC): Chronic | ICD-10-CM

## 2021-07-29 DIAGNOSIS — I42.0 DILATED CARDIOMYOPATHY (HCC): ICD-10-CM

## 2021-07-29 DIAGNOSIS — Z98.890 HISTORY OF REPAIR OF MITRAL VALVE: ICD-10-CM

## 2021-07-29 DIAGNOSIS — Z95.0 S/P PLACEMENT OF CARDIAC PACEMAKER: ICD-10-CM

## 2021-07-29 PROCEDURE — 99215 OFFICE O/P EST HI 40 MIN: CPT | Performed by: INTERNAL MEDICINE

## 2021-07-29 RX ORDER — DILTIAZEM HYDROCHLORIDE 240 MG/1
240 CAPSULE, EXTENDED RELEASE ORAL EVERY MORNING
Qty: 90 CAPSULE | Refills: 3 | Status: ON HOLD
Start: 2021-07-29 | End: 2021-08-18

## 2021-07-29 RX ORDER — SPIRONOLACTONE 25 MG/1
25 TABLET ORAL DAILY
Qty: 90 TABLET | Refills: 3 | Status: ON HOLD
Start: 2021-07-29 | End: 2021-08-18

## 2021-07-29 RX ORDER — FUROSEMIDE 40 MG/1
40 TABLET ORAL DAILY
Qty: 90 TABLET | Refills: 3 | Status: SHIPPED
Start: 2021-07-29 | End: 2021-11-05

## 2021-07-29 ASSESSMENT — ENCOUNTER SYMPTOMS
NAUSEA: 0
DEPRESSION: 0
EYES NEGATIVE: 1
VOMITING: 0
WEAKNESS: 0
COUGH: 0
SHORTNESS OF BREATH: 1
NEUROLOGICAL NEGATIVE: 1
BRUISES/BLEEDS EASILY: 0
CHILLS: 0
FEVER: 0
PALPITATIONS: 0
BLURRED VISION: 0
DIZZINESS: 0
MYALGIAS: 0
MUSCULOSKELETAL NEGATIVE: 1
NERVOUS/ANXIOUS: 0
INSOMNIA: 1
GASTROINTESTINAL NEGATIVE: 1
CARDIOVASCULAR NEGATIVE: 1
DOUBLE VISION: 0
WEIGHT LOSS: 0
ABDOMINAL PAIN: 0
HEADACHES: 0
CLAUDICATION: 0
FOCAL WEAKNESS: 0

## 2021-07-29 ASSESSMENT — FIBROSIS 4 INDEX: FIB4 SCORE: 3.33

## 2021-07-29 NOTE — PROGRESS NOTES
Chief Complaint   Patient presents with   • Atrial Fibrillation   • Hyperlipidemia       Subjective:   Lindsay Landrum is a 88 y.o. female who presents today for follow up of severe mitral regurgitation with history of transcatheter mitral valve repair.    Since the patient's last visit on 08/24/20, she has been suffering with fatigue and shortness of breath. She denies fatigue, shortness of breath, dyspnea on exertion, chest pain, dizziness or syncope. She was evaluated at [facility] and considered for double valve surgery. She is still undecided.    Past Medical History:   Diagnosis Date   • AF (atrial fibrillation) (HCC) 5/21/2012   • Anemia    • Bowel habit changes     takes miralax   • Breath shortness     with a fib   • Cataract     surgery   • Chronic anticoagulation 5/21/2012   • Heart burn    • Heart valve disease    • Hemorrhagic disorder (HCC)     transfusions x2   • High cholesterol    • History of blood transfusion    • Hyperlipidemia 5/21/2012   • Hypothyroidism 5/21/2012   • Indigestion    • Pacemaker    • Urinary incontinence     after procedure   • Vitamin d deficiency 5/21/2012     Past Surgical History:   Procedure Laterality Date   • PB COLONOSCOPY,DIAGNOSTIC N/A 10/5/2020    Procedure: COLONOSCOPY;  Surgeon: J Carlos Palacios M.D.;  Location: SURGERY SAME DAY [facility];  Service: Gastroenterology   • GASTROSCOPY N/A 10/5/2020    Procedure: GASTROSCOPY;  Surgeon: J Carlos Palacios M.D.;  Location: SURGERY SAME DAY [facility];  Service: Gastroenterology   • COLONOSCOPY WITH POLYP N/A 10/5/2020    Procedure: COLONOSCOPY, WITH POLYPECTOMY;  Surgeon: J Carlos Palacios M.D.;  Location: SURGERY SAME DAY [facility];  Service: Gastroenterology   • ABDOMINAL HYSTERECTOMY TOTAL     • CATARACT EXTRACTION WITH IOL     • GYN SURGERY      hysterectomy   • OTHER CARDIAC SURGERY      pacemaker     Family History   Problem Relation Age of Onset   • No Known Problems Mother    • Heart Disease Father    •  Hypertension Father    • Lupus Sister      Social History     Socioeconomic History   • Marital status: Single     Spouse name: Not on file   • Number of children: Not on file   • Years of education: Not on file   • Highest education level: Not on file   Occupational History   • Not on file   Tobacco Use   • Smoking status: Never Smoker   • Smokeless tobacco: Never Used   Vaping Use   • Vaping Use: Never used   Substance and Sexual Activity   • Alcohol use: Yes   • Drug use: No   • Sexual activity: Not on file   Other Topics Concern   • Not on file   Social History Narrative   • Not on file     Social Determinants of Health     Financial Resource Strain:    • Difficulty of Paying Living Expenses:    Food Insecurity:    • Worried About Running Out of Food in the Last Year:    • Ran Out of Food in the Last Year:    Transportation Needs:    • Lack of Transportation (Medical):    • Lack of Transportation (Non-Medical):    Physical Activity:    • Days of Exercise per Week:    • Minutes of Exercise per Session:    Stress:    • Feeling of Stress :    Social Connections:    • Frequency of Communication with Friends and Family:    • Frequency of Social Gatherings with Friends and Family:    • Attends Anabaptism Services:    • Active Member of Clubs or Organizations:    • Attends Club or Organization Meetings:    • Marital Status:    Intimate Partner Violence:    • Fear of Current or Ex-Partner:    • Emotionally Abused:    • Physically Abused:    • Sexually Abused:      No Known Allergies     (Medications reviewed.)  Outpatient Encounter Medications as of 7/29/2021   Medication Sig Dispense Refill   • famotidine (PEPCID) 10 MG tablet Take 1 Tab by mouth every day. 60 Tab 0   • ferrous sulfate 325 (65 Fe) MG tablet Take 1 Tab by mouth every day. 30 Tab 0   • levothyroxine (SYNTHROID) 75 MCG Tab Take 75 mcg by mouth every morning.     • VITAMIN D PO Take 1 Tab by mouth every morning.     • CALCIUM PO Take 1 Tab by mouth every  "morning.     • Cyanocobalamin (VITAMIN B-12 PO) Take 1 Tab by mouth every day.     • NON SPECIFIED Take 1 Tab by mouth every 48 hours. Unknown Eye Vitamin     • MAGNESIUM PO Take 1 Tab by mouth every day.     • diltiazem (TIAZAC) 240 MG SR capsule Take 240 mg by mouth every morning.     • folic acid (FOLVITE) 1 MG Tab Take 1 mg by mouth every day.     • spironolactone (ALDACTONE) 25 MG Tab Take 25 mg by mouth every day.     • furosemide (LASIX) 40 MG Tab Take 40 mg by mouth every day.       No facility-administered encounter medications on file as of 7/29/2021.     Review of Systems   Constitutional: Positive for malaise/fatigue. Negative for chills, fever and weight loss.   HENT: Negative.  Negative for hearing loss.    Eyes: Negative.  Negative for blurred vision and double vision.   Respiratory: Positive for shortness of breath. Negative for cough.    Cardiovascular: Negative.  Negative for chest pain, palpitations, claudication and leg swelling.   Gastrointestinal: Negative.  Negative for abdominal pain, nausea and vomiting.   Genitourinary: Negative.  Negative for dysuria and urgency.   Musculoskeletal: Negative.  Negative for joint pain and myalgias.   Skin: Negative.  Negative for itching and rash.   Neurological: Negative.  Negative for dizziness, focal weakness, weakness and headaches.   Endo/Heme/Allergies: Negative.  Does not bruise/bleed easily.   Psychiatric/Behavioral: Negative for depression. The patient has insomnia. The patient is not nervous/anxious.         Objective:   /62 (BP Location: Left arm, Patient Position: Sitting, BP Cuff Size: Adult)   Pulse 70   Resp 12   Ht 1.575 m (5' 2\")   Wt 50.8 kg (112 lb)   SpO2 97%   BMI 20.49 kg/m²     Physical Exam   Constitutional: She is oriented to person, place, and time. She appears well-developed.   HENT:   Head: Normocephalic and atraumatic.   Neck: No JVD present.   Cardiovascular: Normal rate, regular rhythm and normal heart sounds. "   Pulmonary/Chest: Effort normal and breath sounds normal.   Abdominal: Soft. Bowel sounds are normal.   No hepatosplenomegaly.   Musculoskeletal:         General: Normal range of motion.   Lymphadenopathy:     She has no cervical adenopathy.   Neurological: She is alert and oriented to person, place, and time.   Skin: Skin is warm and dry.      CARDIAC STUDIES/PROCEDURES:    EGD/COLONOSCOPY  By J Carlos Schultz (10/05/20)  EGD  1/ normal esophagus, GEJ at 36 cm normal Z line  2/ normal stomach, antrum pylorus, duodenal bulb and second portion  3/normal egd otherwise  Colonoscopy  1/ cecal  2 cm polyp/mass snared removed with hot cautery completely two resolution clips and 1 cc ink tattoo   2/ sigmoid diverticulosis  3/ otherwise normal colonoscopy  4/ normal ileum    CAROTID ULTRASOUND (09/22/20)  1.  There is a moderate amount of atherosclerotic plaque.  Plaque is located in carotid bulbs and proximal internal carotid arteries, right more than left.  Plaque characterization:  Heterogeneous, calcific and irregular  2.  Diameter reduction in the internal carotid arteries: less than 50%. There is no hemodynamically significant stenosis. There is no evidence of carotid occlusion.  3. Vertebral arteries demonstrate antegrade flow.  (study result reviewed)    ECHOCARDIOGRAM CONCLUSIONS (10/04/20)  Compared to the report of the study done - 07/31/2020 there has been no change.  Mildly reduced left ventricular systolic function. Left ventricular   ejection fraction is visually estimated to be 45%-50%.   Known transcatheter mitral valve repair which is functioning normally   with appropriate transvalvular gradient.   Mean transvalvular gradient is  4.0 mmHg at a heart rate of 66 BPM.   Severe mitral regurgitation.  (study result reviewed)     ECHOCARDIOGRAM CONCLUSIONS (07/31/20)  No prior study is available for comparison.   Borderline reduced left ventricular systolic function.  Left ventricular ejection fraction  is visually estimated to be 50%.  Known transcatheter mitral valve clip with appropriate transvalvular gradient, severe mitral regurgitation.  Moderate aortic insufficiency.  Moderate tricuspid regurgitation.  Consideration of TOM for closer evaluation of mitral regurgtation severity.  (study result reviewed)     EKG performed on (07/19/21) was reviewed: EKG personally interpreted shows paced rhythm.  EKG performed on (08/03/20) EKG paced rhythm.     Laboratory results of (10/06/20) were reviewed. Bun of 30 mg/dl, creatinine levels of 1.32 mg/dl noted.  Laboratory results of (08/04/20)  Bun of 32 mg/dl, creatinine levels of 1.43 mg/dl noted.     PACEMAKER PLACEMENT by Jasper Garcia (08/03/20)  Micra pacemaker implantation.     TRANSESOPHAGEAL ECHOCARDIOGRAM CONCLUSIONS by Yury Hanks (08/17/20)  Numerous attempts were made to pass the probe.    An NG tube was placed successfully into the stomach under visualization by glide scope.    A pediatric probe followed a similar pathway and was able to be passed   but had limited resolution.  Pictures were not taken.  The normal TOM   probe was passed under glide scope visualization to the same anatomic   location but there was a stenosis and the probe could not be passed   numerous times.  Anesthesia was assisting the entire time under glide   scope visualization.    Assessment:     1. History of repair of mitral valve     2. Severe mitral valve regurgitation     3. Chronic systolic congestive heart failure (HCC)     4. Dilated cardiomyopathy (HCC)     5. Atrial fibrillation, unspecified type (HCC)     6. S/P placement of cardiac pacemaker-MDT Micra leadless PPM         Medical Decision Making:  Today's Assessment / Status / Plan:     1. Status post transcatheter percutaneous mitral valve repair (MitraClip) with (at [facility] in 2018; noncandidate for surgical aortic valve replacement or repeat MitraClip repair per Raz Richter as  she is high risk for surgery and unable to perform transesophageal echocardiogram for repeat transcatheter mitral valve repair): She was referred to [facility] and was considered for double valve operation with STS score or 14.6%. They requested right and left cardiac catheterization, non contrast CT of chest, carotid ultrasound, GI evaluation for below issues and dental clearance. She is still undecided.  2. Atrial fibrillation and pacemaker placement on chronic anticoagulation (warfarin): (Managed by Jasper Garcia)   3. Gastrointestinal blood and possible esophageal stricture: She has anemia and melena without source per [facility].     Greater than 45 minutes of time was spent to review all above information;  of which more than 50% of the time was face to face discussing the option of cardiac catheterization, transesophageal echocardiogram and double valve surgery including, alternative options, risk and benefits as stated in the assessment and plan.    We will follow up in one month in valve clinic with labs.    CC Jasper Garcia

## 2021-08-02 DIAGNOSIS — I42.0 DILATED CARDIOMYOPATHY (HCC): ICD-10-CM

## 2021-08-09 ENCOUNTER — TELEPHONE (OUTPATIENT)
Dept: CARDIOLOGY | Facility: MEDICAL CENTER | Age: 86
End: 2021-08-09

## 2021-08-09 DIAGNOSIS — I48.91 ATRIAL FIBRILLATION, UNSPECIFIED TYPE (HCC): ICD-10-CM

## 2021-08-09 DIAGNOSIS — Z79.01 CHRONIC ANTICOAGULATION: ICD-10-CM

## 2021-08-09 NOTE — TELEPHONE ENCOUNTER
ARNULFO    Pt called asking for her lab results. Please call Pt back at [phone].    Thank you

## 2021-08-10 RX ORDER — WARFARIN SODIUM 4 MG/1
4 TABLET ORAL DAILY
Status: SHIPPED | DISCHARGE
Start: 2021-08-10

## 2021-08-10 NOTE — TELEPHONE ENCOUNTER
"Message  Received: Today  RADHA López R.N.  Caller: Unspecified (Yesterday, 12:48 PM)  Please call patient with unremarkable results with good cholesterol profile and mildly elevated renal enzymes without change.     Thanks.  ARNULFO.   --------------------------------------------------------------------    Called pt and notified her of lab results per Dr Capps.     Pt asked about getting her INR checked and if anyone here could do a finger prick instead of a blood draw for INR.    Her last INR check was \"quite a while ago\", she recently moved here from [place] and cannot remember the exact date. She states she is currently taking 4mg of warfarin daily.      Referral placed to the [facility] anticoagulation clinic. Stressed importance of adequate INR monitoring while on warfarin. Advised pt to call oac clinic to arrange and provided their contact number.   "

## 2021-08-11 ENCOUNTER — DOCUMENTATION (OUTPATIENT)
Dept: VASCULAR LAB | Facility: MEDICAL CENTER | Age: 86
End: 2021-08-11

## 2021-08-11 NOTE — PROGRESS NOTES
[facility]    Received anticoagulation referral due to hx of a-fib from Dr. Jean Capps on 8-10-21.    LM with patient to call back to establish care.  Patient already taking warfarin, new to the area.    Insurance: Medicare  PCP: none  Locations to be seen: [facility] or [facility] at [phone], fax [phone]    Santhosh Wood, JimD, BCACP

## 2021-08-12 ENCOUNTER — TELEPHONE (OUTPATIENT)
Dept: CARDIOLOGY | Facility: MEDICAL CENTER | Age: 86
End: 2021-08-12

## 2021-08-12 DIAGNOSIS — Z01.810 PRE-OPERATIVE CARDIOVASCULAR EXAMINATION: ICD-10-CM

## 2021-08-12 DIAGNOSIS — I34.0 SEVERE MITRAL REGURGITATION: ICD-10-CM

## 2021-08-12 DIAGNOSIS — I34.0 SEVERE MITRAL VALVE REGURGITATION: ICD-10-CM

## 2021-08-12 NOTE — TELEPHONE ENCOUNTER
JI    Patient called to advise they would like to go forward with the open heart surgery. PT can be reached at [phone].    Thank you,  Paige VILLAFUERTE

## 2021-08-12 NOTE — TELEPHONE ENCOUNTER
To Dr. Capps to advise of next steps. Do we need to order testing as recommended by Christian in your last progress note?

## 2021-08-13 ENCOUNTER — DOCUMENTATION (OUTPATIENT)
Dept: VASCULAR LAB | Facility: MEDICAL CENTER | Age: 86
End: 2021-08-13

## 2021-08-13 ENCOUNTER — TELEPHONE (OUTPATIENT)
Dept: CARDIOLOGY | Facility: MEDICAL CENTER | Age: 86
End: 2021-08-13

## 2021-08-13 ENCOUNTER — HOSPITAL ENCOUNTER (OUTPATIENT)
Facility: MEDICAL CENTER | Age: 86
End: 2021-08-13
Attending: INTERNAL MEDICINE | Admitting: INTERNAL MEDICINE
Payer: MEDICARE

## 2021-08-13 NOTE — TELEPHONE ENCOUNTER
Patient is scheduled on 8-17-21 for a R&L hrt cath with Dr. Mcneal. Patient was told to hold warfarin for 5 days prior, hold lasix and spironolactone AM day of procedure. Patient to check in at 7:30 for a 9:30 procedure. H&P was done on 7-29-21 by Dr. Capps. Pre admit to call patient.

## 2021-08-13 NOTE — PROGRESS NOTES
[facility] Pharmac[facility]    Received anticoagulation referral due to hx of a-fib from Dr. Jean Capps on 8-10-21.    LM with patient to call back to establish care.    Insurance: Medicare  PCP: none  Locations to be seen: [facility] at [phone], fax [phone]    Santhosh Wood, PharmD, BCACP

## 2021-08-13 NOTE — TELEPHONE ENCOUNTER
Message  Received: Yesterday  Jean Capps M.D.  Ninfa Restrepo R.N.  Caller: Unspecified (Yesterday, 12:50 PM)  Please order right and left cardiac catheterization, non contrast CT of chest, carotid ultrasound, GI evaluation for below issues and dental clearance as requested by [facility]. Thanks.  ARNULFO.   ------------------------------------------------------------  Orders entered and staff message to Annie to schedule cath.     [facility] to notify patient of testing, GI eval, dental.

## 2021-08-13 NOTE — TELEPHONE ENCOUNTER
----- Message from Ninfa Restrepo R.N. sent at 8/13/2021  9:39 AM PDT -----  Regarding: Cath  Please call and schedule right and left cath. Thanks!

## 2021-08-14 ENCOUNTER — PATIENT MESSAGE (OUTPATIENT)
Dept: CARDIOLOGY | Facility: MEDICAL CENTER | Age: 86
End: 2021-08-14

## 2021-08-15 ENCOUNTER — HOSPITAL ENCOUNTER (INPATIENT)
Facility: MEDICAL CENTER | Age: 86
LOS: 3 days | DRG: 307 | End: 2021-08-18
Attending: EMERGENCY MEDICINE | Admitting: STUDENT IN AN ORGANIZED HEALTH CARE EDUCATION/TRAINING PROGRAM
Payer: MEDICARE

## 2021-08-15 ENCOUNTER — HOSPITAL ENCOUNTER (OUTPATIENT)
Dept: RADIOLOGY | Facility: MEDICAL CENTER | Age: 86
End: 2021-08-15
Attending: INTERNAL MEDICINE
Payer: MEDICARE

## 2021-08-15 ENCOUNTER — APPOINTMENT (OUTPATIENT)
Dept: CARDIOLOGY | Facility: MEDICAL CENTER | Age: 86
DRG: 307 | End: 2021-08-15
Attending: INTERNAL MEDICINE
Payer: MEDICARE

## 2021-08-15 ENCOUNTER — APPOINTMENT (OUTPATIENT)
Dept: RADIOLOGY | Facility: MEDICAL CENTER | Age: 86
DRG: 307 | End: 2021-08-15
Attending: EMERGENCY MEDICINE
Payer: MEDICARE

## 2021-08-15 DIAGNOSIS — I34.0 SEVERE MITRAL REGURGITATION: Primary | ICD-10-CM

## 2021-08-15 DIAGNOSIS — Z01.810 PRE-OPERATIVE CARDIOVASCULAR EXAMINATION: ICD-10-CM

## 2021-08-15 DIAGNOSIS — I35.1 MODERATE AORTIC REGURGITATION: ICD-10-CM

## 2021-08-15 DIAGNOSIS — I34.0 SEVERE MITRAL REGURGITATION: ICD-10-CM

## 2021-08-15 DIAGNOSIS — I34.0 SEVERE MITRAL VALVE REGURGITATION: ICD-10-CM

## 2021-08-15 DIAGNOSIS — R06.09 DYSPNEA ON EXERTION: ICD-10-CM

## 2021-08-15 PROBLEM — R06.00 DYSPNEA: Status: ACTIVE | Noted: 2021-08-15

## 2021-08-15 PROBLEM — N17.9 AKI (ACUTE KIDNEY INJURY) (HCC): Status: ACTIVE | Noted: 2021-08-15

## 2021-08-15 PROBLEM — K21.9 GERD (GASTROESOPHAGEAL REFLUX DISEASE): Status: ACTIVE | Noted: 2021-08-15

## 2021-08-15 PROBLEM — R79.89 ELEVATED TROPONIN: Status: ACTIVE | Noted: 2021-08-15

## 2021-08-15 PROBLEM — Z71.89 ACP (ADVANCE CARE PLANNING): Status: ACTIVE | Noted: 2021-08-15

## 2021-08-15 LAB
ALBUMIN SERPL BCP-MCNC: 4.4 G/DL (ref 3.2–4.9)
ALBUMIN/GLOB SERPL: 1.2 G/DL
ALP SERPL-CCNC: 116 U/L (ref 30–99)
ALT SERPL-CCNC: 11 U/L (ref 2–50)
ANION GAP SERPL CALC-SCNC: 18 MMOL/L (ref 7–16)
AST SERPL-CCNC: 36 U/L (ref 12–45)
BASOPHILS # BLD AUTO: 0.6 % (ref 0–1.8)
BASOPHILS # BLD: 0.05 K/UL (ref 0–0.12)
BILIRUB SERPL-MCNC: 0.9 MG/DL (ref 0.1–1.5)
BUN SERPL-MCNC: 41 MG/DL (ref 8–22)
CALCIUM SERPL-MCNC: 10.2 MG/DL (ref 8.5–10.5)
CHLORIDE SERPL-SCNC: 95 MMOL/L (ref 96–112)
CO2 SERPL-SCNC: 22 MMOL/L (ref 20–33)
CREAT SERPL-MCNC: 1.72 MG/DL (ref 0.5–1.4)
EKG IMPRESSION: NORMAL
EOSINOPHIL # BLD AUTO: 0.02 K/UL (ref 0–0.51)
EOSINOPHIL NFR BLD: 0.2 % (ref 0–6.9)
ERYTHROCYTE [DISTWIDTH] IN BLOOD BY AUTOMATED COUNT: 59 FL (ref 35.9–50)
GLOBULIN SER CALC-MCNC: 3.8 G/DL (ref 1.9–3.5)
GLUCOSE SERPL-MCNC: 93 MG/DL (ref 65–99)
HCT VFR BLD AUTO: 48.2 % (ref 37–47)
HGB BLD-MCNC: 15.3 G/DL (ref 12–16)
IMM GRANULOCYTES # BLD AUTO: 0.03 K/UL (ref 0–0.11)
IMM GRANULOCYTES NFR BLD AUTO: 0.3 % (ref 0–0.9)
LV EJECT FRACT  99904: 45
LV EJECT FRACT MOD 2C 99903: 55.44
LV EJECT FRACT MOD 4C 99902: 45.23
LV EJECT FRACT MOD BP 99901: 51.25
LYMPHOCYTES # BLD AUTO: 1.07 K/UL (ref 1–4.8)
LYMPHOCYTES NFR BLD: 12.1 % (ref 22–41)
MCH RBC QN AUTO: 30.9 PG (ref 27–33)
MCHC RBC AUTO-ENTMCNC: 31.7 G/DL (ref 33.6–35)
MCV RBC AUTO: 97.4 FL (ref 81.4–97.8)
MONOCYTES # BLD AUTO: 0.42 K/UL (ref 0–0.85)
MONOCYTES NFR BLD AUTO: 4.7 % (ref 0–13.4)
NEUTROPHILS # BLD AUTO: 7.28 K/UL (ref 2–7.15)
NEUTROPHILS NFR BLD: 82.1 % (ref 44–72)
NRBC # BLD AUTO: 0 K/UL
NRBC BLD-RTO: 0 /100 WBC
NT-PROBNP SERPL IA-MCNC: 2204 PG/ML (ref 0–125)
PLATELET # BLD AUTO: 250 K/UL (ref 164–446)
PMV BLD AUTO: 10.1 FL (ref 9–12.9)
POTASSIUM SERPL-SCNC: 4.9 MMOL/L (ref 3.6–5.5)
PROT SERPL-MCNC: 8.2 G/DL (ref 6–8.2)
RBC # BLD AUTO: 4.95 M/UL (ref 4.2–5.4)
SODIUM SERPL-SCNC: 135 MMOL/L (ref 135–145)
TROPONIN T SERPL-MCNC: 25 NG/L (ref 6–19)
WBC # BLD AUTO: 8.9 K/UL (ref 4.8–10.8)

## 2021-08-15 PROCEDURE — 99223 1ST HOSP IP/OBS HIGH 75: CPT | Performed by: INTERNAL MEDICINE

## 2021-08-15 PROCEDURE — 85025 COMPLETE CBC W/AUTO DIFF WBC: CPT

## 2021-08-15 PROCEDURE — 71250 CT THORAX DX C-: CPT | Mod: MG

## 2021-08-15 PROCEDURE — 93306 TTE W/DOPPLER COMPLETE: CPT | Mod: 26 | Performed by: INTERNAL MEDICINE

## 2021-08-15 PROCEDURE — 770020 HCHG ROOM/CARE - TELE (206)

## 2021-08-15 PROCEDURE — 84484 ASSAY OF TROPONIN QUANT: CPT

## 2021-08-15 PROCEDURE — 93005 ELECTROCARDIOGRAM TRACING: CPT | Performed by: EMERGENCY MEDICINE

## 2021-08-15 PROCEDURE — 83880 ASSAY OF NATRIURETIC PEPTIDE: CPT

## 2021-08-15 PROCEDURE — 99497 ADVNCD CARE PLAN 30 MIN: CPT | Performed by: STUDENT IN AN ORGANIZED HEALTH CARE EDUCATION/TRAINING PROGRAM

## 2021-08-15 PROCEDURE — 93306 TTE W/DOPPLER COMPLETE: CPT

## 2021-08-15 PROCEDURE — 80053 COMPREHEN METABOLIC PANEL: CPT

## 2021-08-15 PROCEDURE — 99223 1ST HOSP IP/OBS HIGH 75: CPT | Mod: AI,25 | Performed by: STUDENT IN AN ORGANIZED HEALTH CARE EDUCATION/TRAINING PROGRAM

## 2021-08-15 PROCEDURE — 93005 ELECTROCARDIOGRAM TRACING: CPT

## 2021-08-15 PROCEDURE — 36415 COLL VENOUS BLD VENIPUNCTURE: CPT

## 2021-08-15 PROCEDURE — 71045 X-RAY EXAM CHEST 1 VIEW: CPT

## 2021-08-15 PROCEDURE — 99285 EMERGENCY DEPT VISIT HI MDM: CPT

## 2021-08-15 RX ORDER — SPIRONOLACTONE 25 MG/1
25 TABLET ORAL DAILY
Status: DISCONTINUED | OUTPATIENT
Start: 2021-08-16 | End: 2021-08-18

## 2021-08-15 RX ORDER — FERROUS SULFATE 325(65) MG
325 TABLET ORAL DAILY
Status: DISCONTINUED | OUTPATIENT
Start: 2021-08-16 | End: 2021-08-15

## 2021-08-15 RX ORDER — ONDANSETRON 4 MG/1
4 TABLET, ORALLY DISINTEGRATING ORAL EVERY 4 HOURS PRN
Status: DISCONTINUED | OUTPATIENT
Start: 2021-08-15 | End: 2021-08-18 | Stop reason: HOSPADM

## 2021-08-15 RX ORDER — ENALAPRILAT 1.25 MG/ML
1.25 INJECTION INTRAVENOUS EVERY 6 HOURS PRN
Status: DISCONTINUED | OUTPATIENT
Start: 2021-08-15 | End: 2021-08-18 | Stop reason: HOSPADM

## 2021-08-15 RX ORDER — GUAIFENESIN/DEXTROMETHORPHAN 100-10MG/5
10 SYRUP ORAL EVERY 6 HOURS PRN
Status: DISCONTINUED | OUTPATIENT
Start: 2021-08-15 | End: 2021-08-18 | Stop reason: HOSPADM

## 2021-08-15 RX ORDER — POLYETHYLENE GLYCOL 3350 17 G/17G
1 POWDER, FOR SOLUTION ORAL
Status: DISCONTINUED | OUTPATIENT
Start: 2021-08-15 | End: 2021-08-18 | Stop reason: HOSPADM

## 2021-08-15 RX ORDER — BISACODYL 10 MG
10 SUPPOSITORY, RECTAL RECTAL
Status: DISCONTINUED | OUTPATIENT
Start: 2021-08-15 | End: 2021-08-18 | Stop reason: HOSPADM

## 2021-08-15 RX ORDER — FUROSEMIDE 40 MG/1
40 TABLET ORAL DAILY
Status: DISCONTINUED | OUTPATIENT
Start: 2021-08-16 | End: 2021-08-17

## 2021-08-15 RX ORDER — FAMOTIDINE 20 MG/1
10 TABLET, FILM COATED ORAL DAILY
Status: DISCONTINUED | OUTPATIENT
Start: 2021-08-16 | End: 2021-08-18 | Stop reason: HOSPADM

## 2021-08-15 RX ORDER — LEVOTHYROXINE SODIUM 0.07 MG/1
75 TABLET ORAL EVERY MORNING
Status: DISCONTINUED | OUTPATIENT
Start: 2021-08-16 | End: 2021-08-18 | Stop reason: HOSPADM

## 2021-08-15 RX ORDER — SODIUM CHLORIDE 9 MG/ML
30 INJECTION, SOLUTION INTRAVENOUS ONCE
Status: DISCONTINUED | OUTPATIENT
Start: 2021-08-15 | End: 2021-08-15

## 2021-08-15 RX ORDER — FOLIC ACID 1 MG/1
1 TABLET ORAL DAILY
Status: DISCONTINUED | OUTPATIENT
Start: 2021-08-16 | End: 2021-08-18 | Stop reason: HOSPADM

## 2021-08-15 RX ORDER — MORPHINE SULFATE 4 MG/ML
2-4 INJECTION, SOLUTION INTRAMUSCULAR; INTRAVENOUS
Status: DISCONTINUED | OUTPATIENT
Start: 2021-08-15 | End: 2021-08-18 | Stop reason: HOSPADM

## 2021-08-15 RX ORDER — HEPARIN SODIUM 5000 [USP'U]/ML
5000 INJECTION, SOLUTION INTRAVENOUS; SUBCUTANEOUS EVERY 8 HOURS
Status: DISCONTINUED | OUTPATIENT
Start: 2021-08-15 | End: 2021-08-15

## 2021-08-15 RX ORDER — AMOXICILLIN 250 MG
2 CAPSULE ORAL 2 TIMES DAILY
Status: DISCONTINUED | OUTPATIENT
Start: 2021-08-15 | End: 2021-08-18 | Stop reason: HOSPADM

## 2021-08-15 RX ORDER — LABETALOL HYDROCHLORIDE 5 MG/ML
10 INJECTION, SOLUTION INTRAVENOUS EVERY 4 HOURS PRN
Status: DISCONTINUED | OUTPATIENT
Start: 2021-08-15 | End: 2021-08-18 | Stop reason: HOSPADM

## 2021-08-15 RX ORDER — ONDANSETRON 2 MG/ML
4 INJECTION INTRAMUSCULAR; INTRAVENOUS EVERY 4 HOURS PRN
Status: DISCONTINUED | OUTPATIENT
Start: 2021-08-15 | End: 2021-08-18 | Stop reason: HOSPADM

## 2021-08-15 RX ORDER — FERROUS GLUCONATE 324(38)MG
324 TABLET ORAL
Status: DISCONTINUED | OUTPATIENT
Start: 2021-08-16 | End: 2021-08-18 | Stop reason: HOSPADM

## 2021-08-15 RX ORDER — NITROGLYCERIN 0.4 MG/1
0.4 TABLET SUBLINGUAL
Status: DISCONTINUED | OUTPATIENT
Start: 2021-08-15 | End: 2021-08-18 | Stop reason: HOSPADM

## 2021-08-15 RX ORDER — VITAMIN B COMPLEX
1000 TABLET ORAL DAILY
Status: DISCONTINUED | OUTPATIENT
Start: 2021-08-16 | End: 2021-08-18

## 2021-08-15 RX ORDER — DILTIAZEM HYDROCHLORIDE 240 MG/1
240 CAPSULE, COATED, EXTENDED RELEASE ORAL EVERY MORNING
Status: DISCONTINUED | OUTPATIENT
Start: 2021-08-16 | End: 2021-08-17

## 2021-08-15 RX ORDER — ACETAMINOPHEN 325 MG/1
650 TABLET ORAL EVERY 6 HOURS PRN
Status: DISCONTINUED | OUTPATIENT
Start: 2021-08-15 | End: 2021-08-18 | Stop reason: HOSPADM

## 2021-08-15 ASSESSMENT — ENCOUNTER SYMPTOMS
WEAKNESS: 0
BLURRED VISION: 0
DIZZINESS: 0
DOUBLE VISION: 0
BLOOD IN STOOL: 0
FLANK PAIN: 0
DEPRESSION: 0
SPEECH CHANGE: 0
CHILLS: 0
FOCAL WEAKNESS: 0
VOMITING: 0
HEARTBURN: 0
MYALGIAS: 0
ORTHOPNEA: 1
SPUTUM PRODUCTION: 0
ABDOMINAL PAIN: 0
PND: 1
NECK PAIN: 0
COUGH: 0
NAUSEA: 0
SHORTNESS OF BREATH: 1
BRUISES/BLEEDS EASILY: 1
FEVER: 0
HEADACHES: 0
HEMOPTYSIS: 0

## 2021-08-15 ASSESSMENT — FIBROSIS 4 INDEX: FIB4 SCORE: 3.33

## 2021-08-15 NOTE — ED TRIAGE NOTES
"Chief Complaint   Patient presents with   • Shortness of Breath     Pt states she has had SOB x1 week. Pt has no hx of lung issues. Pt states \"she has been trying to get open heart surgery for her valves\". Pt is vaccinated for covid.    • Loss of Appetite     Pt states she has been \"hardly eating and drinking\". Pt states she \"has all this phlem that is keeping her from eating/drinking\".      /59   Pulse 76   Temp 35.8 °C (96.5 °F) (Temporal)   Resp 18   Ht 1.575 m (5' 2\")   Wt 49 kg (108 lb)   SpO2 93%   BMI 19.75 kg/m²     Patient arrived to ED for the above complaint. Triage process explained to patient. Patient placed in lobby and told to notify staff of any changes.   "

## 2021-08-16 ENCOUNTER — APPOINTMENT (OUTPATIENT)
Dept: ADMISSIONS | Facility: MEDICAL CENTER | Age: 86
End: 2021-08-16
Payer: MEDICARE

## 2021-08-16 ENCOUNTER — ANESTHESIA (OUTPATIENT)
Dept: CARDIOLOGY | Facility: MEDICAL CENTER | Age: 86
DRG: 307 | End: 2021-08-16
Payer: MEDICARE

## 2021-08-16 ENCOUNTER — ANESTHESIA EVENT (OUTPATIENT)
Dept: CARDIOLOGY | Facility: MEDICAL CENTER | Age: 86
DRG: 307 | End: 2021-08-16
Payer: MEDICARE

## 2021-08-16 ENCOUNTER — APPOINTMENT (OUTPATIENT)
Dept: RADIOLOGY | Facility: MEDICAL CENTER | Age: 86
DRG: 307 | End: 2021-08-16
Attending: STUDENT IN AN ORGANIZED HEALTH CARE EDUCATION/TRAINING PROGRAM
Payer: MEDICARE

## 2021-08-16 ENCOUNTER — TELEPHONE (OUTPATIENT)
Dept: CARDIOLOGY | Facility: MEDICAL CENTER | Age: 86
End: 2021-08-16

## 2021-08-16 ENCOUNTER — APPOINTMENT (OUTPATIENT)
Dept: CARDIOLOGY | Facility: MEDICAL CENTER | Age: 86
DRG: 307 | End: 2021-08-16
Attending: INTERNAL MEDICINE
Payer: MEDICARE

## 2021-08-16 PROBLEM — R13.19 ESOPHAGEAL DYSPHAGIA: Status: ACTIVE | Noted: 2021-08-16

## 2021-08-16 PROBLEM — I24.89 DEMAND ISCHEMIA (HCC): Status: ACTIVE | Noted: 2021-08-15

## 2021-08-16 PROBLEM — I50.32 CHRONIC DIASTOLIC HEART FAILURE (HCC): Status: ACTIVE | Noted: 2021-07-29

## 2021-08-16 LAB
ALBUMIN SERPL BCP-MCNC: 3.9 G/DL (ref 3.2–4.9)
BASOPHILS # BLD AUTO: 0.8 % (ref 0–1.8)
BASOPHILS # BLD: 0.06 K/UL (ref 0–0.12)
BUN SERPL-MCNC: 42 MG/DL (ref 8–22)
CALCIUM SERPL-MCNC: 9.4 MG/DL (ref 8.5–10.5)
CHLORIDE SERPL-SCNC: 99 MMOL/L (ref 96–112)
CHOLEST SERPL-MCNC: 192 MG/DL (ref 100–199)
CK SERPL-CCNC: 69 U/L (ref 0–154)
CO2 SERPL-SCNC: 24 MMOL/L (ref 20–33)
CREAT SERPL-MCNC: 1.81 MG/DL (ref 0.5–1.4)
EKG IMPRESSION: NORMAL
EOSINOPHIL # BLD AUTO: 0.06 K/UL (ref 0–0.51)
EOSINOPHIL NFR BLD: 0.8 % (ref 0–6.9)
ERYTHROCYTE [DISTWIDTH] IN BLOOD BY AUTOMATED COUNT: 58.4 FL (ref 35.9–50)
EST. AVERAGE GLUCOSE BLD GHB EST-MCNC: 111 MG/DL
GLUCOSE BLD-MCNC: 100 MG/DL (ref 65–99)
GLUCOSE BLD-MCNC: 147 MG/DL (ref 65–99)
GLUCOSE BLD-MCNC: 210 MG/DL (ref 65–99)
GLUCOSE BLD-MCNC: 55 MG/DL (ref 65–99)
GLUCOSE BLD-MCNC: 60 MG/DL (ref 65–99)
GLUCOSE BLD-MCNC: 67 MG/DL (ref 65–99)
GLUCOSE SERPL-MCNC: 88 MG/DL (ref 65–99)
HBA1C MFR BLD: 5.5 % (ref 4–5.6)
HCT VFR BLD AUTO: 45.1 % (ref 37–47)
HDLC SERPL-MCNC: 49 MG/DL
HGB BLD-MCNC: 14.5 G/DL (ref 12–16)
IMM GRANULOCYTES # BLD AUTO: 0.02 K/UL (ref 0–0.11)
IMM GRANULOCYTES NFR BLD AUTO: 0.3 % (ref 0–0.9)
INR PPP: 3.15 (ref 0.87–1.13)
LDLC SERPL CALC-MCNC: 118 MG/DL
LYMPHOCYTES # BLD AUTO: 1.24 K/UL (ref 1–4.8)
LYMPHOCYTES NFR BLD: 16.6 % (ref 22–41)
MAGNESIUM SERPL-MCNC: 2.5 MG/DL (ref 1.5–2.5)
MCH RBC QN AUTO: 31 PG (ref 27–33)
MCHC RBC AUTO-ENTMCNC: 32.2 G/DL (ref 33.6–35)
MCV RBC AUTO: 96.4 FL (ref 81.4–97.8)
MONOCYTES # BLD AUTO: 0.48 K/UL (ref 0–0.85)
MONOCYTES NFR BLD AUTO: 6.4 % (ref 0–13.4)
NEUTROPHILS # BLD AUTO: 5.6 K/UL (ref 2–7.15)
NEUTROPHILS NFR BLD: 75.1 % (ref 44–72)
NRBC # BLD AUTO: 0 K/UL
NRBC BLD-RTO: 0 /100 WBC
PHOSPHATE SERPL-MCNC: 3.5 MG/DL (ref 2.5–4.5)
PLATELET # BLD AUTO: 206 K/UL (ref 164–446)
PMV BLD AUTO: 10.1 FL (ref 9–12.9)
POTASSIUM SERPL-SCNC: 4.5 MMOL/L (ref 3.6–5.5)
PROTHROMBIN TIME: 31.4 SEC (ref 12–14.6)
RBC # BLD AUTO: 4.68 M/UL (ref 4.2–5.4)
SODIUM SERPL-SCNC: 139 MMOL/L (ref 135–145)
TRIGL SERPL-MCNC: 123 MG/DL (ref 0–149)
TROPONIN T SERPL-MCNC: 35 NG/L (ref 6–19)
WBC # BLD AUTO: 7.5 K/UL (ref 4.8–10.8)

## 2021-08-16 PROCEDURE — 99232 SBSQ HOSP IP/OBS MODERATE 35: CPT | Mod: GC | Performed by: INTERNAL MEDICINE

## 2021-08-16 PROCEDURE — 700105 HCHG RX REV CODE 258: Performed by: ANESTHESIOLOGY

## 2021-08-16 PROCEDURE — 160002 HCHG RECOVERY MINUTES (STAT)

## 2021-08-16 PROCEDURE — 93320 DOPPLER ECHO COMPLETE: CPT | Mod: 26 | Performed by: INTERNAL MEDICINE

## 2021-08-16 PROCEDURE — 83036 HEMOGLOBIN GLYCOSYLATED A1C: CPT

## 2021-08-16 PROCEDURE — 83735 ASSAY OF MAGNESIUM: CPT

## 2021-08-16 PROCEDURE — 93312 ECHO TRANSESOPHAGEAL: CPT | Mod: 26 | Performed by: INTERNAL MEDICINE

## 2021-08-16 PROCEDURE — 82962 GLUCOSE BLOOD TEST: CPT

## 2021-08-16 PROCEDURE — 93325 DOPPLER ECHO COLOR FLOW MAPG: CPT

## 2021-08-16 PROCEDURE — 700105 HCHG RX REV CODE 258: Performed by: INTERNAL MEDICINE

## 2021-08-16 PROCEDURE — 700111 HCHG RX REV CODE 636 W/ 250 OVERRIDE (IP): Performed by: ANESTHESIOLOGY

## 2021-08-16 PROCEDURE — 85025 COMPLETE CBC W/AUTO DIFF WBC: CPT

## 2021-08-16 PROCEDURE — 700102 HCHG RX REV CODE 250 W/ 637 OVERRIDE(OP): Performed by: STUDENT IN AN ORGANIZED HEALTH CARE EDUCATION/TRAINING PROGRAM

## 2021-08-16 PROCEDURE — 93010 ELECTROCARDIOGRAM REPORT: CPT | Performed by: INTERNAL MEDICINE

## 2021-08-16 PROCEDURE — 82550 ASSAY OF CK (CPK): CPT

## 2021-08-16 PROCEDURE — B24BZZ4 ULTRASONOGRAPHY OF HEART WITH AORTA, TRANSESOPHAGEAL: ICD-10-PCS | Performed by: INTERNAL MEDICINE

## 2021-08-16 PROCEDURE — 80069 RENAL FUNCTION PANEL: CPT

## 2021-08-16 PROCEDURE — 700101 HCHG RX REV CODE 250

## 2021-08-16 PROCEDURE — A9270 NON-COVERED ITEM OR SERVICE: HCPCS | Performed by: STUDENT IN AN ORGANIZED HEALTH CARE EDUCATION/TRAINING PROGRAM

## 2021-08-16 PROCEDURE — 85610 PROTHROMBIN TIME: CPT

## 2021-08-16 PROCEDURE — 80061 LIPID PANEL: CPT

## 2021-08-16 PROCEDURE — 93005 ELECTROCARDIOGRAM TRACING: CPT | Performed by: INTERNAL MEDICINE

## 2021-08-16 PROCEDURE — 99233 SBSQ HOSP IP/OBS HIGH 50: CPT | Performed by: INTERNAL MEDICINE

## 2021-08-16 PROCEDURE — 700101 HCHG RX REV CODE 250: Performed by: ANESTHESIOLOGY

## 2021-08-16 PROCEDURE — 770020 HCHG ROOM/CARE - TELE (206)

## 2021-08-16 PROCEDURE — 84484 ASSAY OF TROPONIN QUANT: CPT

## 2021-08-16 PROCEDURE — 36415 COLL VENOUS BLD VENIPUNCTURE: CPT

## 2021-08-16 RX ORDER — INSULIN LISPRO 100 [IU]/ML
1-6 INJECTION, SOLUTION INTRAVENOUS; SUBCUTANEOUS
Status: DISCONTINUED | OUTPATIENT
Start: 2021-08-16 | End: 2021-08-18 | Stop reason: HOSPADM

## 2021-08-16 RX ORDER — INSULIN LISPRO 100 [IU]/ML
0.2 INJECTION, SOLUTION INTRAVENOUS; SUBCUTANEOUS
Status: DISCONTINUED | OUTPATIENT
Start: 2021-08-17 | End: 2021-08-17

## 2021-08-16 RX ORDER — SODIUM CHLORIDE 9 MG/ML
INJECTION, SOLUTION INTRAVENOUS CONTINUOUS
Status: DISCONTINUED | OUTPATIENT
Start: 2021-08-16 | End: 2021-08-16

## 2021-08-16 RX ORDER — SODIUM CHLORIDE, SODIUM LACTATE, POTASSIUM CHLORIDE, CALCIUM CHLORIDE 600; 310; 30; 20 MG/100ML; MG/100ML; MG/100ML; MG/100ML
INJECTION, SOLUTION INTRAVENOUS CONTINUOUS
Status: DISCONTINUED | OUTPATIENT
Start: 2021-08-16 | End: 2021-08-16 | Stop reason: HOSPADM

## 2021-08-16 RX ORDER — DEXTROSE MONOHYDRATE 25 G/50ML
INJECTION, SOLUTION INTRAVENOUS
Status: COMPLETED
Start: 2021-08-16 | End: 2021-08-16

## 2021-08-16 RX ORDER — DIPHENHYDRAMINE HYDROCHLORIDE 50 MG/ML
12.5 INJECTION INTRAMUSCULAR; INTRAVENOUS
Status: DISCONTINUED | OUTPATIENT
Start: 2021-08-16 | End: 2021-08-16 | Stop reason: HOSPADM

## 2021-08-16 RX ORDER — LABETALOL HYDROCHLORIDE 5 MG/ML
5 INJECTION, SOLUTION INTRAVENOUS
Status: DISCONTINUED | OUTPATIENT
Start: 2021-08-16 | End: 2021-08-16 | Stop reason: HOSPADM

## 2021-08-16 RX ORDER — ONDANSETRON 2 MG/ML
4 INJECTION INTRAMUSCULAR; INTRAVENOUS
Status: DISCONTINUED | OUTPATIENT
Start: 2021-08-16 | End: 2021-08-16 | Stop reason: HOSPADM

## 2021-08-16 RX ORDER — HALOPERIDOL 5 MG/ML
1 INJECTION INTRAMUSCULAR
Status: DISCONTINUED | OUTPATIENT
Start: 2021-08-16 | End: 2021-08-16 | Stop reason: HOSPADM

## 2021-08-16 RX ORDER — DEXTROSE MONOHYDRATE 25 G/50ML
25 INJECTION, SOLUTION INTRAVENOUS ONCE
Status: COMPLETED | OUTPATIENT
Start: 2021-08-16 | End: 2021-08-16

## 2021-08-16 RX ORDER — DEXTROSE MONOHYDRATE 25 G/50ML
50 INJECTION, SOLUTION INTRAVENOUS
Status: DISCONTINUED | OUTPATIENT
Start: 2021-08-16 | End: 2021-08-18 | Stop reason: HOSPADM

## 2021-08-16 RX ORDER — SODIUM CHLORIDE, SODIUM LACTATE, POTASSIUM CHLORIDE, CALCIUM CHLORIDE 600; 310; 30; 20 MG/100ML; MG/100ML; MG/100ML; MG/100ML
INJECTION, SOLUTION INTRAVENOUS
Status: DISCONTINUED | OUTPATIENT
Start: 2021-08-16 | End: 2021-08-16 | Stop reason: SURG

## 2021-08-16 RX ORDER — HYDRALAZINE HYDROCHLORIDE 20 MG/ML
5 INJECTION INTRAMUSCULAR; INTRAVENOUS
Status: DISCONTINUED | OUTPATIENT
Start: 2021-08-16 | End: 2021-08-16 | Stop reason: HOSPADM

## 2021-08-16 RX ADMIN — FENTANYL CITRATE 50 MCG: 50 INJECTION, SOLUTION INTRAMUSCULAR; INTRAVENOUS at 13:54

## 2021-08-16 RX ADMIN — EPHEDRINE SULFATE 10 MG: 50 INJECTION INTRAMUSCULAR; INTRAVENOUS; SUBCUTANEOUS at 14:07

## 2021-08-16 RX ADMIN — PROPOFOL 20 MG: 10 INJECTION, EMULSION INTRAVENOUS at 13:59

## 2021-08-16 RX ADMIN — SODIUM CHLORIDE, POTASSIUM CHLORIDE, SODIUM LACTATE AND CALCIUM CHLORIDE: 600; 310; 30; 20 INJECTION, SOLUTION INTRAVENOUS at 13:52

## 2021-08-16 RX ADMIN — DEXTROSE MONOHYDRATE 25 ML: 25 INJECTION, SOLUTION INTRAVENOUS at 15:25

## 2021-08-16 RX ADMIN — DOCUSATE SODIUM 50 MG AND SENNOSIDES 8.6 MG 2 TABLET: 8.6; 5 TABLET, FILM COATED ORAL at 05:55

## 2021-08-16 RX ADMIN — Medication 1000 UNITS: at 05:53

## 2021-08-16 RX ADMIN — FOLIC ACID 1 MG: 1 TABLET ORAL at 05:54

## 2021-08-16 RX ADMIN — SODIUM CHLORIDE: 9 INJECTION, SOLUTION INTRAVENOUS at 03:25

## 2021-08-16 RX ADMIN — LEVOTHYROXINE SODIUM 75 MCG: 0.07 TABLET ORAL at 05:54

## 2021-08-16 RX ADMIN — DILTIAZEM HYDROCHLORIDE 240 MG: 240 CAPSULE, COATED, EXTENDED RELEASE ORAL at 05:54

## 2021-08-16 RX ADMIN — FAMOTIDINE 10 MG: 20 TABLET ORAL at 05:54

## 2021-08-16 RX ADMIN — PROPOFOL 30 MG: 10 INJECTION, EMULSION INTRAVENOUS at 13:54

## 2021-08-16 ASSESSMENT — LIFESTYLE VARIABLES
EVER FELT BAD OR GUILTY ABOUT YOUR DRINKING: NO
ON A TYPICAL DAY WHEN YOU DRINK ALCOHOL HOW MANY DRINKS DO YOU HAVE: 4
HOW MANY TIMES IN THE PAST YEAR HAVE YOU HAD 5 OR MORE DRINKS IN A DAY: 0
AVERAGE NUMBER OF DAYS PER WEEK YOU HAVE A DRINK CONTAINING ALCOHOL: 3
DOES PATIENT WANT TO STOP DRINKING: NO
HAVE YOU EVER FELT YOU SHOULD CUT DOWN ON YOUR DRINKING: NO
TOTAL SCORE: 0
EVER HAD A DRINK FIRST THING IN THE MORNING TO STEADY YOUR NERVES TO GET RID OF A HANGOVER: NO
CONSUMPTION TOTAL: POSITIVE
ALCOHOL_USE: YES
HAVE PEOPLE ANNOYED YOU BY CRITICIZING YOUR DRINKING: NO

## 2021-08-16 ASSESSMENT — ENCOUNTER SYMPTOMS
PND: 0
HEMOPTYSIS: 0
CHILLS: 0
ORTHOPNEA: 1
BRUISES/BLEEDS EASILY: 1
WHEEZING: 0
BLURRED VISION: 0
HEARTBURN: 0
COUGH: 0
SHORTNESS OF BREATH: 0
FEVER: 0
PALPITATIONS: 0

## 2021-08-16 ASSESSMENT — COGNITIVE AND FUNCTIONAL STATUS - GENERAL
SUGGESTED CMS G CODE MODIFIER MOBILITY: CJ
CLIMB 3 TO 5 STEPS WITH RAILING: A LITTLE
PERSONAL GROOMING: A LITTLE
MOBILITY SCORE: 22
DAILY ACTIVITIY SCORE: 23
SUGGESTED CMS G CODE MODIFIER DAILY ACTIVITY: CI
WALKING IN HOSPITAL ROOM: A LITTLE

## 2021-08-16 ASSESSMENT — PATIENT HEALTH QUESTIONNAIRE - PHQ9
2. FEELING DOWN, DEPRESSED, IRRITABLE, OR HOPELESS: NOT AT ALL
1. LITTLE INTEREST OR PLEASURE IN DOING THINGS: NOT AT ALL
SUM OF ALL RESPONSES TO PHQ9 QUESTIONS 1 AND 2: 0

## 2021-08-16 ASSESSMENT — PAIN DESCRIPTION - PAIN TYPE
TYPE: ACUTE PAIN

## 2021-08-16 ASSESSMENT — PAIN SCALES - GENERAL: PAIN_LEVEL: 0

## 2021-08-16 ASSESSMENT — FIBROSIS 4 INDEX: FIB4 SCORE: 3.82

## 2021-08-16 NOTE — THERAPY
Physical Therapy Contact Note    PT consult received and acknowledged. Per chart review patient planned for cardiac cath today. Will initiate PT eval following procedure as able and appropriate.    Naomi Huynh, PT, DPT  [phone]

## 2021-08-16 NOTE — ASSESSMENT & PLAN NOTE
Patient complains of the feeling of food getting stuck in her throat  Increased phlem after eating/drinking anything  According to speech path the dysphagia seems more esophageal in nature,   Barium Esophagogram pending today.   Will consult G.I and f/u according to results

## 2021-08-16 NOTE — PROGRESS NOTES
4 Eyes Skin Assessment Completed by MARCO A Kimble and MARCO A Marquis.    Head WDL  Ears WDL  Nose WDL  Mouth WDL  Neck WDL  Breast/Chest WDL  Shoulder Blades WDL  Spine WDL  (R) Arm/Elbow/Hand Bruising  (L) Arm/Elbow/Hand Bruising  Abdomen WDL  Groin WDL  Scrotum/Coccyx/Buttocks Redness and Blanching  (R) Leg WDL  (L) Leg WDL  (R) Heel/Foot/Toe WDL  (L) Heel/Foot/Toe WDL          Devices In Places Tele Box      Interventions In Place Pillows and Pressure Redistribution Mattress    Possible Skin Injury No    Pictures Uploaded Into Epic N/A  Wound Consult Placed N/A  RN Wound Prevention Protocol Ordered No

## 2021-08-16 NOTE — ASSESSMENT & PLAN NOTE
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

## 2021-08-16 NOTE — ANESTHESIA PREPROCEDURE EVALUATION
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      CARDIAC   (positive) AF (atrial fibrillation) ([facility])   (positive) Moderate aortic regurgitation   (positive) Severe mitral regurgitation      GI   (positive) GERD (gastroesophageal reflux disease)         (positive) JESSICA (acute kidney injury) ([facility])   (positive) CKD (chronic kidney disease)      ENDO   (positive) Hypothyroidism       Physical Exam    Airway   Mallampati: II  TM distance: >3 FB  Neck ROM: full       Cardiovascular - normal exam  Rhythm: regular  Rate: normal  (-) murmur     Dental - normal exam           Pulmonary - normal exam  Breath sounds clear to auscultation     Abdominal    Neurological - normal exam                 Anesthesia Plan    ASA 3       Plan - MAC               Induction: intravenous          Informed Consent:    Anesthetic plan and risks discussed with patient.

## 2021-08-16 NOTE — PROGRESS NOTES
Report received, pt care assumed, tele box on. VSS, pt assessment complete. Pt aaox4, no signs of distress noted at this time. POC discussed with pt and verbalizes no questions.Pt denies any additional needs at this time. Bed in lowest position, bed alarm on, pt educated on fall risk and verbalized understanding, call light within reach

## 2021-08-16 NOTE — CONSULTS
"Cardiology Initial Consult Note    Date of service:    8/15/2021      Consulting Physician: Gerald Alexander M.D.    Patient ID:    Name:   Lindsay Landrum   YOB: 1932  Age:   88 y.o.  female   MRN:   [id number]      Reason for Consultation: SOB    HPI:  Lindsay Landrum is a 88 y.o.-year-old female with a history of atrial fibrillation, pacemaker, severe MR s/p mitraclip with recurrent severe MR and moderate AR, potential esophageal stricture causing inability to do a TOM even with glidoscope guidance, dyslipidemia, hypertension, and previous GI bleed who presents with SOB.     She reports a month ago she could walk around 4 blocks before becoming SOB. She then moved back up here from where [place] in [place] and since then she has had progressive SOB and BR[place]FORD to the point sometimes she can not even walk across a room without severe dyspnea. This is associated with \"head fullness\" and palpitations. No recent medication changes.     She has a complex medical history. She was evaluated by my colleague Dr. Capps for repeat percutaneous mitral valve repair. As part of the work-up, Dr. Mckenna attempted a TOM during which they could not intubate the esophagus even with glidoscope guidance. Due to this, it is my understanding she was referred to [place] and via telemedicine she saw Dr. Clifton Dye there who discussed doing an open mitral valve repair with possible MAZE and aortic valve intervention. She started to do the pre-operative work-up for this but the surgery was cancelled due to anemia and her GI bleed.     Since then her anemia has improved back to normal and she has no current GI bleeding. She is interested in aggressive management of her valvular heart disease including still considering open heart surgery.     Of note, she has significant loss of appetite especially over the last couple days. Denies abdominal pain. She is losing some weight from this. "     ROS  Constitution: Negative for chills, fever and night sweats.   HENT: Negative for nosebleeds.    Eyes: Negative for vision loss in left eye and vision loss in right eye.   Respiratory: Negative for hemoptysis.    Gastrointestinal: Negative for hematemesis, hematochezia and melena.   Genitourinary: Negative for hematuria.   Neurological: Negative for focal weakness, numbness and paresthesias.      All others reviewed and negative.      Past Medical History:   Diagnosis Date   • AF (atrial fibrillation) (HCC) 5/21/2012   • Anemia    • Bowel habit changes     takes miralax   • Breath shortness     with a fib   • Cataract     surgery   • Chronic anticoagulation 5/21/2012   • Heart burn    • Heart valve disease    • Hemorrhagic disorder (HCC)     transfusions x2   • High cholesterol    • History of blood transfusion    • Hyperlipidemia 5/21/2012   • Hypothyroidism 5/21/2012   • Indigestion    • Pacemaker    • Urinary incontinence     after procedure   • Vitamin d deficiency 5/21/2012       Past Surgical History:   Procedure Laterality Date   • PB COLONOSCOPY,DIAGNOSTIC N/A 10/5/2020    Procedure: COLONOSCOPY;  Surgeon: J Carlos Palacios M.D.;  Location: SURGERY SAME DAY [facility];  Service: Gastroenterology   • GASTROSCOPY N/A 10/5/2020    Procedure: GASTROSCOPY;  Surgeon: J Carlos Palacios M.D.;  Location: SURGERY SAME DAY [facility];  Service: Gastroenterology   • COLONOSCOPY WITH POLYP N/A 10/5/2020    Procedure: COLONOSCOPY, WITH POLYPECTOMY;  Surgeon: J Carlos Palacios M.D.;  Location: SURGERY SAME DAY [facility];  Service: Gastroenterology   • ABDOMINAL HYSTERECTOMY TOTAL     • CATARACT EXTRACTION WITH IOL     • GYN SURGERY      hysterectomy   • OTHER CARDIAC SURGERY      pacemaker         Current Outpatient Medications   Medication Sig Dispense Refill   • warfarin (COUMADIN) 4 MG Tab Take 1 tablet by mouth every day.     • diltiazem (TIAZAC) 240 MG SR capsule Take 1 capsule by mouth every morning. 90 capsule  3   • spironolactone (ALDACTONE) 25 MG Tab Take 1 tablet by mouth every day. 90 tablet 3   • furosemide (LASIX) 40 MG Tab Take 1 tablet by mouth every day. 90 tablet 3   • famotidine (PEPCID) 10 MG tablet Take 1 Tab by mouth every day. 60 Tab 0   • ferrous sulfate 325 (65 Fe) MG tablet Take 1 Tab by mouth every day. 30 Tab 0   • levothyroxine (SYNTHROID) 75 MCG Tab Take 75 mcg by mouth every morning.     • VITAMIN D PO Take 1 Tab by mouth every morning.     • CALCIUM PO Take 1 Tab by mouth every morning.     • Cyanocobalamin (VITAMIN B-12 PO) Take 1 Tab by mouth every day.     • NON SPECIFIED Take 1 Tab by mouth every 48 hours. Unknown Eye Vitamin     • MAGNESIUM PO Take 1 Tab by mouth every day.     • folic acid (FOLVITE) 1 MG Tab Take 1 mg by mouth every day.           No Known Allergies      Family History   Problem Relation Age of Onset   • No Known Problems Mother    • Heart Disease Father    • Hypertension Father    • Lupus Sister          Social History     Socioeconomic History   • Marital status: Single     Spouse name: Not on file   • Number of children: Not on file   • Years of education: Not on file   • Highest education level: Not on file   Occupational History   • Not on file   Tobacco Use   • Smoking status: Never Smoker   • Smokeless tobacco: Never Used   Vaping Use   • Vaping Use: Never used   Substance and Sexual Activity   • Alcohol use: Yes   • Drug use: No   • Sexual activity: Not on file   Other Topics Concern   • Not on file   Social History Narrative   • Not on file     Social Determinants of Health     Financial Resource Strain:    • Difficulty of Paying Living Expenses:    Food Insecurity:    • Worried About Running Out of Food in the Last Year:    • Ran Out of Food in the Last Year:    Transportation Needs:    • Lack of Transportation (Medical):    • Lack of Transportation (Non-Medical):    Physical Activity:    • Days of Exercise per Week:    • Minutes of Exercise per Session:    Stress:   "  • Feeling of Stress :    Social Connections:    • Frequency of Communication with Friends and Family:    • Frequency of Social Gatherings with Friends and Family:    • Attends Holiness Services:    • Active Member of Clubs or Organizations:    • Attends Club or Organization Meetings:    • Marital Status:    Intimate Partner Violence:    • Fear of Current or Ex-Partner:    • Emotionally Abused:    • Physically Abused:    • Sexually Abused:          Physical Exam  Body mass index is 19.75 kg/m².  /83   Pulse 60   Temp 35.8 °C (96.5 °F) (Temporal)   Resp 16   Ht 1.575 m (5' 2\")   Wt 49 kg (108 lb)   SpO2 93%   Vitals:    08/15/21 1356 08/15/21 1409 08/15/21 1800   BP: 116/59  127/83   Pulse: 76  60   Resp: 18  16   Temp: 35.8 °C (96.5 °F)     TempSrc: Temporal     SpO2: 93%  93%   Weight:  49 kg (108 lb)    Height: 1.575 m (5' 2\") 1.575 m (5' 2\")      Oxygen Therapy:  Pulse Oximetry: 93 %    General: No apparent distress  Eyes: nl conjunctiva  ENT: normal nasal appearance.  Neck: JVP 4-5 cm H2O, no carotid bruits  Lungs: normal respiratory effort, CTAB  Heart: RRR, 3/6 systolic murmur at the apex, no rubs or gallops, no edema bilateral lower extremities. No LV/RV heave on cardiac palpatation. 2+ bilateral radial pulses.  2+ bilateral dp pulses.   Abdomen: soft, non tender, non distended, no masses, normal bowel sounds.  No HSM.  Extremities/MSK: no clubbing, no cyanosis  Neurological: No focal sensory deficits  Psychiatric: Appropriate affect, A/O x 3  Skin: Warm extremities        Labs (personally reviewed and notable for):   Trop 25      Cardiac Imaging and Procedures Review:    EKG dated 8/15/2021: My personal interpretation is a fib, v paced,     Echo dated 7/2020:  CONCLUSIONS  No prior study is available for comparison.   Borderline reduced left ventricular systolic function.  Left ventricular ejection fraction is visually estimated to be 50%.  Known transcatheter mitral valve clip with appropriate " transvalvular   gradient, severe mitral regurgitation.  Moderate aortic insufficiency.  Moderate tricuspid regurgitation.  Consideration of TOM for closer evaluation of mitral regurgtation   Severity.    Echo 10/2020:  CONCLUSIONS  Compared to the report of the study done - 07/31/2020 there has been no   change.  Mildly reduced left ventricular systolic function. Left ventricular   ejection fraction is visually estimated to be 45%-50%.   Known transcatheter mitral valve repair which is functioning normally   with appropriate transvalvular gradient. Mean transvalvular gradient is    4.0 mmHg at a heart rate of 66 BPM. Severe mitral regurgitation.         TOM attempt 8/2020:     Echocardiography Laboratory  CONCLUSIONS  Numerous attempts were made to pass the probe.  An NG tube was placed   successfully into the stomach under visualization by glide scope.  A   pediatric probe followed a similar pathway and was able to be passed   but had limited resolution.  Pictures were not taken.  The normal TOM   probe was passed under glide scope visualization to the same anatomic   location but there was a stenosis and the probe could not be passed   numerous times.  Anesthesia was assisting the entire time under glide   scope visualization.        Radiology test Review:  CT Chest: 8/15/2021:  IMPRESSION:     1.  No acute abnormality in the chest.  2.  Chronic atelectasis/scarring in the right lower lung.  3.  Cardiomegaly with right atrial enlargement.  4.  Transcatheter mitral valve repair. Metallic device in the right ventricle.    Procedure note:  PROCEDURE PERFORMED: Micra pacemaker implantation, Removal of pacemaker generator, extraction of pacemaker lead (single system)      EGD/colo 10/2020:  Post OP Note     PreOp Diagnosis: anemia     PostOp Diagnosis:               EGD                          1/ normal esophagus, GEJ at 36 cm normal Z line                          2/ normal stomach, antrum pylorus, duodenal bulb and  second portion                          3/normal egd otherwise                 Colonoscopy                          1/ cecal  2 cm polyp/mass snared removed with hot cautery completely two resolution clips and 1 cc ink tattoo              2/ sigmoid diverticulosis              3/ otherwise normal colonoscopy              4/ normal ileum    CT surgery recommendations (based on telemedicine encounter 9/2020)  5. Additional workups:   - Dental Clearance: No, not yet cleared, patient will work work on obtaining dental clearance after today visit.     - LHC/RHC to assess cors to be done locally     - CT chest ( NON CON given CKD stage 4 baseline Cr. Of 1.5 and GFR 25-35) to assess anatomy and look for calcifications of the aorta--> be done locally.    - Carotid US pre-op cardiovascular to be done locally.     - GI evaluation and upper GI scope to assess for possible esophageal stricture given the TRANSESOPHAGEAL ECHOCARDIOGRAM probe could not be passed.            Impression and Medical Decision Making:  # SOB - likely due to severe MR complicated by aortic regurgitation as well. She could also have components of her reactive airway disease exacerbated by the smoke and higher altitude.   # Chronic diastolic heart failure secondary to valvular disease, does not appear to be in acute exacerbation, NYHA class III-IV, stage C.   # Severe mitral regurgitation s/p mitraclip at [facility] in 2018 with recurrent severe MR. Pending potential CT surgery at [place] with Dr. Dye after pre-operative work-up (last discussed with Christian 1 year ago)  # Moderate aortic regurgitation  # Atrial fibrillation on coumadin.   # Complete heart block s/p micra pacemaker implantation  # Dyslipidemia  # GI bleed, 2020 with EGD/colo showing diverticulosis  # Anorexia - concern for GI pathology or valvular disease as cause of symptoms  # Acute on chronic kidney disease, baseline creatinine 1.3, GFR 38.       Recommendations:  # Given  her worsening symptoms, we discussed continued outpatient work-up vs inpatient work-up. She is severely dyspneic with any sort of movement at this point and thus she opted to be admitted for optimization, RHC/LHC tomorrow if possible. EGD was reviewed and showed no stricture, so I do believe repeat attempt at TOM is reasonable to assess her for potential repeat mitraclip as an alternative to open heart cardiothoracic surgery which I believe she is extremely high risk to undergo. Should mitraclip not be an option here, they could get images and see if this would be an option at [facility] or another center. We did discuss at length the alternative of hospice and focusing on comfort. As she was feeling better just 1 month ago before coming up to [place] and felt she had an adequate quality of life then, she would like to be aggressive in terms of feeling better symptomatically. I do believe a palliative care consultation will be helpful regardless of what she chooses as her cardiac disease remains severe. I have placed this referral  # continue diltiazem, spironolactone  # hold lasix in the setting of JESSICA and potential dehydration. Add diuretics/fluids based on RHC/LHC results  # I do suggest outpatient PFTs as part of the work-up.   # NPO for cath and TOM tomorrow. If cath is able to be performed, will cancel currently scheduled cath on Tuesday.   # further work-up of anorexia, may benefit from abdominal imaging  # hold coumadin, check INR in the AM. No need for bridge.       Thank you for allowing me to participate in the care of this patient, Cardiology will continue to follow.  Please contact me with any questions.      Christiano Delgado MD  Cardiologist, [facility]   [phone]

## 2021-08-16 NOTE — OR NURSING
1420 pt arrived cath lab via gurney. Report given by anesthesia and RN. Awake. 6L mask, even non labored breathing. Lungs clear, airway patent. SR partially paced. VVI, low Rate 70, medtronic. Skin pink warm dry. Piv patent. Denies pain, nausea, sob, chest pain. Blue fingers (raynauds?)    1430 RA.  Denies pain and nausea .    1445 BS 60 capillary, given juice. Awake and alert.     1518 BS 67 cap, 55 venous. MD Goodson notified.     1525 given 25ml d50.    1550 , MD Goodson notified.     1600 awake. Even non labored breathing. Skin pink warm dry. Denies pain, nausea ,sob, chest pain. Ready for transfer to room.     1607 Report given to Berta RN [id number] bed 1     1612 pt ready for transfer.

## 2021-08-16 NOTE — THERAPY
Missed Therapy     Patient Name: Lindsay Landrum  Age:  88 y.o., Sex:  female  Medical Record #: [id number]  Today's Date: 8/16/2021    Discussed missed therapy with RN       08/16/21 1205   Treatment Variance   Reason For Missed Therapy Medical - Patient not Able To Participate   Total Time Spent   Total Time Spent (Mins) 5   Interdisciplinary Plan of Care Collaboration   IDT Collaboration with  Nursing   Collaboration Comments This SLP attempted to see patient for clinical swallow evaluation. Patient NPO for TOM today. SLP will hold evaluation and re-attempt tomorrow as able and appropriate. Thank you.

## 2021-08-16 NOTE — PROGRESS NOTES
Pt on unit. Report received from MARCO A Marquis. Bedside report received and patient care assumed. Pt is resting in bed, A&O4, with no complaints of pain, and is on RA. Tele box on, monitor room notified. All fall precautions are in place, belongings at bedside table.  Pt was updated on POC, no questions or concerns. Pt educated on use of call light for assistance. Will continue to monitor.

## 2021-08-16 NOTE — PROGRESS NOTES
Daily Progress Note:     Date of Service: 8/16/2021  Primary Team: [facility] IM White Team   Attending: Ceasar Saeed M.D.   Senior Resident: Dr. Radha Sy  Intern: Dr. Penny Ojeda  Contact:  [phone]    Chief Complaint:   Shortness of breath, dyspnea at rest and worsened with exertion.   89 yo female with h/o A fibb on warfarin & pacemaker,   severe MR status post mitral clip with recurrent severe MR and   moderate AR who was recently referred to [facility] for OR intervention presented with SOB worse on exertion.   She has not had preop heart catheterization for tentative surgical intervention.TOM was attempted previously for complex percutaneous mitral valve repair, however could not intubate due to dysphagia and unable to advance scope.  Ultimately, procedure was canceled due to anemia and concern of GI bleed.  However, her GI bleed has since resolved and she is interested in pursuing her surgery.    Subjective:  No acute events overnight. Denies any chest pain, SOB, fever, chills. Has not had any Bowel movement since yesterday but has been urinating fine. Complains of a lot of phlem- if eat/drink anything, the amount of phlem increases and she gets a feeling of food getting stuck.      Consultants/Specialty:  Cardiology   Review of Systems:   Review of Systems   Constitutional: Positive for malaise/fatigue. Negative for chills and fever.   HENT: Negative for hearing loss.    Eyes: Negative for blurred vision.   Respiratory: Negative for cough, hemoptysis, shortness of breath and wheezing.         Complains of increased phlem   Cardiovascular: Positive for orthopnea. Negative for chest pain, palpitations and PND.   Gastrointestinal: Negative for heartburn.        Complains of the feeling of food getting stuck.    Genitourinary: Negative for dysuria.   Skin: Negative for rash.   Endo/Heme/Allergies: Bruises/bleeds easily.       Objective Data:   Vitals:   Temp:  [36 °C (96.8 °F)-36.5 °C (97.7  °F)] 36.3 °C (97.3 °F)  Pulse:  [60-88] 70  Resp:  [14-19] 17  BP: (102-144)/(54-83) 111/62  SpO2:  [93 %-100 %] 97 %  Physical Exam:   Physical Exam  Vitals and nursing note reviewed.   Constitutional:       General: She is not in acute distress.     Appearance: She is not ill-appearing or toxic-appearing.      Comments: Patient looks weak, malnourished    HENT:      Head: Normocephalic and atraumatic.      Right Ear: External ear normal.      Left Ear: External ear normal.      Nose: Nose normal. No congestion.      Mouth/Throat:      Mouth: Mucous membranes are moist.   Eyes:      General:         Right eye: No discharge.         Left eye: No discharge.   Cardiovascular:      Rate and Rhythm: Rhythm irregular.      Comments: A systolic murmer was noted on auscultation   Pulmonary:      Effort: Pulmonary effort is normal. No respiratory distress.      Breath sounds: Normal breath sounds. No wheezing.   Abdominal:      General: Abdomen is flat. Bowel sounds are normal.      Palpations: Abdomen is soft.      Tenderness: There is no abdominal tenderness.   Musculoskeletal:         General: No swelling.      Right lower leg: No edema.      Left lower leg: No edema.      Comments: Swelling on the fingers of the hands, right more than left.  Patient has Raynauds phenomenon    Skin:     General: Skin is warm.      Coloration: Skin is not jaundiced.   Neurological:      Mental Status: She is alert and oriented to person, place, and time.         Labs:   Recent Labs     08/15/21  1511 08/16/21  0339   WBC 8.9 7.5   RBC 4.95 4.68   HEMOGLOBIN 15.3 14.5   HEMATOCRIT 48.2* 45.1   MCV 97.4 96.4   MCH 30.9 31.0   RDW 59.0* 58.4*   PLATELETCT 250 206   MPV 10.1 10.1   NEUTSPOLYS 82.10* 75.10*   LYMPHOCYTES 12.10* 16.60*   MONOCYTES 4.70 6.40   EOSINOPHILS 0.20 0.80   BASOPHILS 0.60 0.80     Recent Labs     08/15/21  1511 08/16/21  0339   SODIUM 135 139   POTASSIUM 4.9 4.5   CHLORIDE 95* 99   CO2 22 24   GLUCOSE 93 88   BUN 41*  42*   CPKTOTAL  --  69       Imaging:   CT CHEST (THORAX) 8/15/2021  1.  No acute abnormality in the chest.  2.  Chronic atelectasis/scarring in the right lower lung.  3.  Cardiomegaly with right atrial enlargement.  4.  Transcatheter mitral valve repair. Metallic device in the right ventricle.  DX CHEST PORTABLE 8/15/2021  1.  Stable moderate cardiomegaly.  2.  Minimal RIGHT pleural fluid versus pleural reactive change, improved from prior.  3.  No pneumonia or overt pulmonary edema.    Echo 8/15/2021 (From Cardiology) :  CONCLUSIONS  Left ventricular systolic function is mildly decreased.  Left ventricular ejection fraction is visually estimated to be 45%.  Global hypokinesis.  Mildly reduced right ventricular systolic function.  Severe right atrial enlargement.  Dilated right ventricle.  Severe right atrial enlargement.  Severely dilated left atrium.  Known transcatheter mitral valve clip repair with appropriate   transvalvular gradients.  Severe mitral regurgitation.  Moderate tricuspid regurgitation.  Right atrial pressure is estimated to be 8 mmHg.  Estimated right ventricular systolic pressure  is 30 mmHg.    EC-TOM W/O CONT         EC-ECHOCARDIOGRAM COMPLETE W/O CONT   Final Result      DX-CHEST-PORTABLE (1 VIEW)   Final Result      1.  Stable moderate cardiomegaly.   2.  Minimal RIGHT pleural fluid versus pleural reactive change, improved from prior.   3.  No pneumonia or overt pulmonary edema.      CL-LEFT HEART CATHETERIZATION WITH POSSIBLE INTERVENTION    (Results Pending)   DX-ESOPH. FLUORO (BARIUM SWALLOW)    (Results Pending)        * Dyspnea  Assessment & Plan  Multifactorial-severe MR status post mitral clip with recurrent severe MR and moderate AR    Stat TTE: 45% EF, severe MR, moderate AR, moderate TR  She has been referred to [facility] for OR intervention by primary cardiologist, Dr. Capps    Cardiology (Dr. Delgado) follow-up appreciated -- LHC+RHC and TOM planned 8/16 AM  -may  consider attempting palliative mitral clip as alternative to open surgery pending TOM imaging and cath finding    May consider cath later     AF (atrial fibrillation) ([facility])- (present on admission)  Assessment & Plan  Rate: CCB    OAC: hold warfarin for now as planned for cath/TOM -- resume tomorrow    Esophageal dysphagia  Assessment & Plan  Patient complains of the feeling of food getting stuck in her throat  Increased phlem after eating/drinking anything  Barium Esophagogram after TOM today  Will f/u results     JESSICA (acute kidney injury) ([facility])  Assessment & Plan  JESSICA on CKD IV  Suspect will worsen with heart cath contrast, proceeding with the TOM for now (holding off on cardiac cath per cardiology)  Minimize other nephrotoxic agents, renally dose meds    Demand ischemia ([facility])  Assessment & Plan  SOB, no chest pain  Heart cath post TOM if required  JESSICA also contributory    Moderate aortic regurgitation  Assessment & Plan  As noted in dyspnea    ACP (advance care planning)  Assessment & Plan  Plan of care and rationale for hospitalization discussed with patient  DNR/DNI status confirmed    Severe mitral valve regurgitation as well as moderate aortic valve regurgitation --patient is a high surgical candidate as well as history of GI bleed --high probability and likelihood to decline without intervention and at risk of death with intervention as well    Palliative care consulted for further assistance with ACP    ACP: 20 minutes    S/P placement of cardiac pacemaker-MDT Micrayesha leadless PPM  Assessment & Plan  h/o    Vitamin d deficiency  Assessment & Plan  supplement    GERD (gastroesophageal reflux disease)  Assessment & Plan  pepcid    Chronic diastolic heart failure (HCC)- (present on admission)  Assessment & Plan  Resume CHF meds when appropriate    Malnutrition ([facility])- (present on admission)  Assessment & Plan  Folic acid    Severe mitral regurgitation  Assessment & Plan  As noted in dyspnea  Prior mitral  clip    Gastrointestinal hemorrhage with melena- (present on admission)  Assessment & Plan  H/o, no GIB at this time, Hgb 15.3    History of repair of mitral valve  Assessment & Plan  H/o at [facility]    Hypothyroidism  Assessment & Plan  Synthroid    Hyperlipidemia  Assessment & Plan  statin

## 2021-08-16 NOTE — PROGRESS NOTES
[facility] Cardiology Follow-up Note    Date of Service:    8/16/2021      Name:   Lindsay Landrum   YOB: 1932  Age:   88 y.o.  female   MRN:   [id number]      Chief Complaint: dyspnea on exertion    Primary Cardiologist:  Dr. Capps    HPI:  Ms. Landrum is an 87 y/o lady with PMH including paroxysmal atrial fibrillation on warfarin, SSS s/p Micra PPM 2020, severe MR s/p Mitraclip [facility] 2018 now with recurrent MR, moderate AI, Dyslipidemia, Essential hypertension, hx of GIB who presented to [facility] on 8/15/2021 with shortness of breath and worsening dyspnea with exertion.       She was evaluated at [place] recently and is planning on having open valve replacement.  She had plans for out patient TOM, R/LHC.    Interim Events:  She is resting comfortably when I see her this AM, laying pretty flat in bed.   Denies shortness of breath at rest.   No LE swelling.      ROS  Constitutional:  denies fatigue.  Respiratory:  Denies shortness of breath at rest today, no cough.  Cardiovascular:  Denies chest pain.  no lower extremity edema.  Denies orthopnea or PND.  : + polyuria, no dysuria.  GI:  Denies nausea/vomiting.  No abdominal distention. + anorexia.  Neuro:  Denies dizziness, syncope.  Hem/lymph: Denies easy bleeding/bruising.      All other review of systems reviewed and negative.    Past medical, surgical, social, and family history reviewed and unchanged from admission except as noted in HPI.    Medications: Reviewed in MAR  Current Facility-Administered Medications   Medication Dose Frequency Provider Last Rate Last Admin   • lidocaine (XYLOCAINE) 1 % injection 0.5 mL  0.5 mL Once PRN Christiano Delgado M.D.       • senna-docusate (PERICOLACE or SENOKOT S) 8.6-50 MG per tablet 2 Tablet  2 Tablet BID Jordan Franco M.D.   2 Tablet at 08/16/21 0555    And   • polyethylene glycol/lytes (MIRALAX) PACKET 1 Packet  1 Packet QDAY PRN Jordan Franco M.D.        And   •  "magnesium hydroxide (MILK OF MAGNESIA) suspension 30 mL  30 mL QDAY PRN Jordan Franco M.D.        And   • bisacodyl (DULCOLAX) suppository 10 mg  10 mg QDAY PRN Jordan Franco M.D.       • enalaprilat (VASOTEC) injection 1.25 mg  1.25 mg Q6HRS PRN Jordan Franco M.D.       • labetalol (NORMODYNE/TRANDATE) injection 10 mg  10 mg Q4HRS PRN Jordan Franco M.D.       • acetaminophen (Tylenol) tablet 650 mg  650 mg Q6HRS PRN Jordan Franco M.D.       • nitroglycerin (NITROSTAT) tablet 0.4 mg  0.4 mg Q5 MIN PRN Jordan Franco M.D.       • morphine (pf) 4 mg/mL injection 2-4 mg  2-4 mg Q5 MIN PRN Jordan Franco M.D.       • ondansetron (ZOFRAN) syringe/vial injection 4 mg  4 mg Q4HRS PRN Jordan Franco M.D.       • ondansetron (ZOFRAN ODT) dispertab 4 mg  4 mg Q4HRS PRN Jordan Franco M.D.       • guaiFENesin dextromethorphan (ROBITUSSIN DM) 100-10 MG/5ML syrup 10 mL  10 mL Q6HRS PRN Jordan Franco M.D.       • DILTIAZem CD (CARDIZEM CD) capsule 240 mg  240 mg GUNNAR Franco M.D.   240 mg at 08/16/21 0554   • famotidine (PEPCID) tablet 10 mg  10 mg DAILY Jordan Franco M.D.   10 mg at 08/16/21 0554   • folic acid (FOLVITE) tablet 1 mg  1 mg DAILY Jordan Franco M.D.   1 mg at 08/16/21 0554   • [Held by provider] furosemide (LASIX) tablet 40 mg  40 mg DAILY Jordan Franco M.D.       • levothyroxine (SYNTHROID) tablet 75 mcg  75 mcg GUNNAR Franco M.D.   75 mcg at 08/16/21 0554   • [Held by provider] spironolactone (ALDACTONE) tablet 25 mg  25 mg DAILY Jordan Franco M.D.       • ferrous gluconate (FERGON) tablet 324 mg  324 mg QDAY with Breakfast Jordan Franco M.D.       • vitamin D (cholecalciferol) tablet 1,000 Units  1,000 Units DAILY Jordan Franco M.D.   1,000 Units at 08/16/21 0553   Last reviewed on 8/15/2021  8:38 PM by Naima Fernandez, Pharmacy Intern    No Known Allergies    Physical Exam  Body mass index is 18.35 kg/m². /60   Pulse 69   Temp 36.3 °C (97.3 °F) (Temporal)   Resp 16   Ht 1.575 m (5' 2\")   Wt 45.5 " "kg (100 lb 5 oz)   SpO2 98%    Vitals:    08/16/21 0059 08/16/21 0225 08/16/21 0552 08/16/21 0800   BP: 118/59 117/63 127/67 102/60   Pulse: 70 70 70 69   Resp:  14 16 16   Temp:  36 °C (96.8 °F) 36.5 °C (97.7 °F) 36.3 °C (97.3 °F)   TempSrc:   Oral Temporal   SpO2: 95% 96% 98%    Weight:  45.5 kg (100 lb 5 oz)     Height:  1.575 m (5' 2\")      Oxygen Therapy:  Pulse Oximetry: 98 %, O2 Delivery Device: None - Room Air    General: no apparent distress  Neck: + JVD   Lungs: normal effort,  without crackles, no wheezing or rhonchi  Heart: normal rate, regular rhythm, 2/6 systolic murmur at the LSB, no rub  EXT: no lower extremity edema  Neurological: No focal deficits, no facial asymmetry.  Normal speech  Psychiatric: Appropriate affect, alert and oriented x 3  Skin: Warm extremities, no rash    Labs (personally reviewed):     Lab Results   Component Value Date/Time    SODIUM 139 08/16/2021 03:39 AM    POTASSIUM 4.5 08/16/2021 03:39 AM    CHLORIDE 99 08/16/2021 03:39 AM    CO2 24 08/16/2021 03:39 AM    GLUCOSE 88 08/16/2021 03:39 AM    BUN 42 (H) 08/16/2021 03:39 AM    CREATININE 1.81 (H) 08/16/2021 03:39 AM    BUNCREATRAT 18 07/27/2020 10:33 AM     Lab Results   Component Value Date/Time    ALKPHOSPHAT 116 (H) 08/15/2021 03:11 PM    ASTSGOT 36 08/15/2021 03:11 PM    ALTSGPT 11 08/15/2021 03:11 PM    TBILIRUBIN 0.9 08/15/2021 03:11 PM      Lab Results   Component Value Date/Time    CHOLSTRLTOT 192 08/16/2021 03:39 AM     (H) 08/16/2021 03:39 AM    HDL 49 08/16/2021 03:39 AM    TRIGLYCERIDE 123 08/16/2021 03:39 AM         Cardiac Imaging and Procedures Review:      Personal Telemetry Review:  SR 70s.    Echo 8/15/2021:  CONCLUSIONS  Left ventricular systolic function is mildly decreased.  Left ventricular ejection fraction is visually estimated to be 45%.  Global hypokinesis.  Mildly reduced right ventricular systolic function.  Severe right atrial enlargement.  Dilated right ventricle.  Severe right atrial " enlargement.  Severely dilated left atrium.  Known transcatheter mitral valve clip repair with appropriate   transvalvular gradients.  Severe mitral regurgitation.  Moderate tricuspid regurgitation.  Right atrial pressure is estimated to be 8 mmHg.  Estimated right ventricular systolic pressure  is 30 mmHg.      EP Micra leadless PPM Implant by Dr. Zamorano 8/3/2020.    [facility] 2021   Device History: Sick Sinus Syndrome;Atrial Fibrillation   Device Type: Pacemaker;Other (Comment)   Mode: V V I R   Rate: 70   Presenting Rhythm:    Ventricularly Paced: (%) 99.8   Right Ventricular Lead Threshold: (Volts) 0.75   Right Ventricular Lead Sensing: (mV) 9.6   Right Ventricular Lead Impedance: (Ohms) 390   Battery Estimated Longevity: 7-8.7 Years   Changes Made: No changes made       Assessment and Medical Decision Makin   Severe mitral valve regurgitation.  -    S/p Mitraclip  with recurrence  -   Plan for TOM today to evaluate valve status  -   Cxl R/LHC for today, will reschedule following TOM results.    2   Moderate AI    3   Paroxysmal AFIB  -    Remains in SR    4   Chronic anticoagulation with warfarin   -    Hold for procedures  -    INR 3.15 this AM.   -    Appears he got Vit K this AM.    5   SSS s/p Micra PPM     6   Hx of anemia due to GIB  -    H/H has been stable. hgb 14.5 this AM    7   Cardiomyopathy, EF 45%. (EF had been borderline in the past 50%), likely similar to previous assessments.  -    Potential plans for LHC pending TOM per above.     Will follow.    Vicki Fischer PA-C  [facility]

## 2021-08-16 NOTE — ASSESSMENT & PLAN NOTE
Rate: CCB, decreased the dose today because the B.P was running low.    OAC: warfarin was held, resume as appropriate

## 2021-08-16 NOTE — PROGRESS NOTES
Patient returned to tele 717 via ACLS RN, on a zoll. Patient placed back on tele monitoring, rate and rhythm verified through the monitor room. On 1L oz, responds to commands.Post op vitals Q30. Will continue to monitor.

## 2021-08-16 NOTE — CARE PLAN
The patient is Watcher - Medium risk of patient condition declining or worsening         Progress made toward(s) clinical / shift goals:    Pt resting comfortably. Denies pain and denies SOB at rest.     Problem: Knowledge Deficit - Standard  Goal: Patient and family/care givers will demonstrate understanding of plan of care, disease process/condition, diagnostic tests and medications  Outcome: Progressing       Patient is not progressing towards the following goals:

## 2021-08-16 NOTE — CARE PLAN
Problem: Communication  Goal: The ability to communicate needs accurately and effectively will improve  Outcome: Progressing  Note: Pt calls appropriately and communicates needs       Problem: Risk for Aspiration  Goal: Patient's risk for aspiration will be absent or decrease  Outcome: Progressing  Note: Speech eval was ordered     Problem: Nutrition  Goal: Patient's nutritional and fluid intake will be adequate or improve  Outcome: Progressing  Note: Pt was NPO for TOM today   The patient is Stable - Low risk of patient condition declining or worsening    Shift Goals  Clinical Goals:  SOB  Patient Goals: rest

## 2021-08-16 NOTE — TELEPHONE ENCOUNTER
----- Message from [facility] [place] sent at 8/16/2021 10:06 AM PDT -----  Patient's CT was for [place] evaluation. See Ninfa Restrepo's note from 8/13. Patient is now in ER, no need to call patient. SC

## 2021-08-16 NOTE — ED NOTES
Med rec complete - pt   Allergies reviewed   No abx last 30 days     Pt has list of medications at bedside.  She said that she knew she took her medications today (08/15/2021), but she didn't remember what time.

## 2021-08-16 NOTE — ASSESSMENT & PLAN NOTE
Plan of care and rationale for hospitalization discussed with patient  DNR/DNI status confirmed    Severe mitral valve regurgitation as well as moderate aortic valve regurgitation --patient is a high surgical candidate as well as history of GI bleed --high probability and likelihood to decline without intervention and at risk of death with intervention as well    Palliative care consulted for further assistance with ACP    ACP: 20 minutes

## 2021-08-16 NOTE — H&P
"Hospital Medicine History & Physical Note    Date of Service  8/15/2021    Primary Care Physician  Pcp Pt States None    Consultants  Cardiology (Dr. Delgado)    Code Status  DNAR/DNI    Chief Complaint  Chief Complaint   Patient presents with   • Shortness of Breath     Pt states she has had SOB x1 week. Pt has no hx of lung issues. Pt states \"she has been trying to get open heart surgery for her valves\". Pt is vaccinated for covid.    • Loss of Appetite     Pt states she has been \"hardly eating and drinking\". Pt states she \"has all this phlem that is keeping her from eating/drinking\".        History of Presenting Illness  Lindsay Landrum is a 88 y.o. female with history of A. fib on warfarin and pacemaker placement, severe MR status post mitral clip with recurrent severe MR and moderate AR followed by Dr. Capps who was recently referred to [facility] for OR intervention who presented 8/15/2021 with persistent shortness of breath.  Patient reported being referred to [place] for when she was last seen by outpatient cardiology on 7/29/2021.  However she has not had preop heart catheterization for tentative surgical intervention.  She returned to ER today due to dyspnea at rest and worsened with exertion.  It was noted that TOM was attempted previously for complex percutaneous mitral valve repair, however could not intubate due to dysphagia and unable to advance scope.  Ultimately, procedure was canceled due to anemia and concern of GI bleed.  However, her GI bleed has since resolved and she is interested in pursuing her surgery.    At time of my evaluation, patient appears comfortable with no respiratory distress.  She has been evaluated by cardiology and tentatively planned for LHC+RHC and TOM tomorrow a.m.  Discussed plan of care with cardiology-no heparin drip, will follow.    I discussed the plan of care with patient, bedside RN and cardiology.    Review of Systems  Review of Systems   Constitutional: " Positive for malaise/fatigue. Negative for chills and fever.   HENT: Negative for hearing loss and tinnitus.    Eyes: Negative for blurred vision and double vision.   Respiratory: Positive for shortness of breath. Negative for cough, hemoptysis and sputum production.    Cardiovascular: Positive for orthopnea and PND. Negative for chest pain.   Gastrointestinal: Negative for abdominal pain, blood in stool, heartburn, nausea and vomiting.   Genitourinary: Negative for dysuria and flank pain.   Musculoskeletal: Negative for myalgias and neck pain.   Skin: Negative for itching and rash.   Neurological: Negative for dizziness, speech change, focal weakness, weakness and headaches.   Endo/Heme/Allergies: Negative for environmental allergies. Bruises/bleeds easily.   Psychiatric/Behavioral: Negative for depression and suicidal ideas.   All other systems reviewed and are negative.      Past Medical History   has a past medical history of AF (atrial fibrillation) (HCC) (5/21/2012), Anemia, Bowel habit changes, Breath shortness, Cataract, Chronic anticoagulation (5/21/2012), Heart burn, Heart valve disease, Hemorrhagic disorder (HCC), High cholesterol, History of blood transfusion, Hyperlipidemia (5/21/2012), Hypothyroidism (5/21/2012), Indigestion, Pacemaker, Urinary incontinence, and Vitamin d deficiency (5/21/2012).    Surgical History   has a past surgical history that includes gyn surgery; other cardiac surgery; cataract extraction with iol; abdominal hysterectomy total; pr colonoscopy,diagnostic (N/A, 10/5/2020); gastroscopy (N/A, 10/5/2020); and colonoscopy with polyp (N/A, 10/5/2020).     Family History  family history includes Heart Disease in her father; Hypertension in her father; Lupus in her sister; No Known Problems in her mother.   Family history reviewed with patient. There is family history that is pertinent to the chief complaint.     Social History   reports that she has never smoked. She has never used  smokeless tobacco. She reports current alcohol use. She reports that she does not use drugs.    Allergies  No Known Allergies    Medications  Prior to Admission Medications   Prescriptions Last Dose Informant Patient Reported? Taking?   CALCIUM PO  Patient Yes No   Sig: Take 1 Tab by mouth every morning.   Cyanocobalamin (VITAMIN B-12 PO)  Patient Yes No   Sig: Take 1 Tab by mouth every day.   MAGNESIUM PO  Patient Yes No   Sig: Take 1 Tab by mouth every day.   NON SPECIFIED  Patient Yes No   Sig: Take 1 Tab by mouth every 48 hours. Unknown Eye Vitamin   VITAMIN D PO  Patient Yes No   Sig: Take 1 Tab by mouth every morning.   diltiazem (TIAZAC) 240 MG SR capsule   No No   Sig: Take 1 capsule by mouth every morning.   famotidine (PEPCID) 10 MG tablet   No No   Sig: Take 1 Tab by mouth every day.   ferrous sulfate 325 (65 Fe) MG tablet   No No   Sig: Take 1 Tab by mouth every day.   folic acid (FOLVITE) 1 MG Tab  Patient Yes No   Sig: Take 1 mg by mouth every day.   furosemide (LASIX) 40 MG Tab   No No   Sig: Take 1 tablet by mouth every day.   levothyroxine (SYNTHROID) 75 MCG Tab  Patient Yes No   Sig: Take 75 mcg by mouth every morning.   spironolactone (ALDACTONE) 25 MG Tab   No No   Sig: Take 1 tablet by mouth every day.   warfarin (COUMADIN) 4 MG Tab   No No   Sig: Take 1 tablet by mouth every day.      Facility-Administered Medications: None       Physical Exam  Temp:  [35.8 °C (96.5 °F)-36 °C (96.8 °F)] 36 °C (96.8 °F)  Pulse:  [60-88] 70  Resp:  [14-18] 14  BP: (112-144)/(59-83) 117/63  SpO2:  [93 %-96 %] 96 %    Physical Exam  Vitals and nursing note reviewed.   Constitutional:       General: She is not in acute distress.     Appearance: She is ill-appearing. She is not toxic-appearing.      Comments: frail   HENT:      Head: Normocephalic and atraumatic.      Nose: Nose normal.      Mouth/Throat:      Mouth: Mucous membranes are dry.      Pharynx: Oropharynx is clear.   Eyes:      General: No scleral  icterus.     Extraocular Movements: Extraocular movements intact.   Cardiovascular:      Rate and Rhythm: Normal rate. Rhythm irregular.      Pulses: Normal pulses.      Heart sounds: Murmur (3/6 systolic) heard.   No friction rub.   Pulmonary:      Effort: Pulmonary effort is normal. No respiratory distress.      Breath sounds: No wheezing or rhonchi.   Abdominal:      General: There is no distension.      Palpations: Abdomen is soft.      Tenderness: There is no abdominal tenderness. There is no guarding or rebound.   Musculoskeletal:         General: No swelling or tenderness. Normal range of motion.      Cervical back: Neck supple. No tenderness.   Skin:     General: Skin is warm and dry.      Capillary Refill: Capillary refill takes less than 2 seconds.   Neurological:      General: No focal deficit present.      Mental Status: She is alert and oriented to person, place, and time.   Psychiatric:         Mood and Affect: Mood normal.         Laboratory:  Recent Labs     08/15/21  1511   WBC 8.9   RBC 4.95   HEMOGLOBIN 15.3   HEMATOCRIT 48.2*   MCV 97.4   MCH 30.9   MCHC 31.7*   RDW 59.0*   PLATELETCT 250   MPV 10.1     Recent Labs     08/15/21  1511   SODIUM 135   POTASSIUM 4.9   CHLORIDE 95*   CO2 22   GLUCOSE 93   BUN 41*   CREATININE 1.72*   CALCIUM 10.2     Recent Labs     08/15/21  1511   ALTSGPT 11   ASTSGOT 36   ALKPHOSPHAT 116*   TBILIRUBIN 0.9   GLUCOSE 93         Recent Labs     08/15/21  1511   NTPROBNP 2204*         Recent Labs     08/15/21  1511   TROPONINT 25*       Imaging:  EC-ECHOCARDIOGRAM COMPLETE W/O CONT   Final Result      DX-CHEST-PORTABLE (1 VIEW)   Final Result      1.  Stable moderate cardiomegaly.   2.  Minimal RIGHT pleural fluid versus pleural reactive change, improved from prior.   3.  No pneumonia or overt pulmonary edema.      CL-LEFT HEART CATHETERIZATION WITH POSSIBLE INTERVENTION    (Results Pending)       X-Ray:  I have personally reviewed the images and compared with prior  images.  EKG:  I have personally reviewed the images and compared with prior images.    Assessment/Plan:  I anticipate this patient will require at least two midnights for appropriate medical management, necessitating inpatient admission.    * Dyspnea  Assessment & Plan  Multifactorial-severe MR status post mitral clip with recurrent severe MR and moderate AR    Stat TTE: 45% EF, severe MR, moderate AR, moderate TR  She has been referred to [facility] for OR intervention by primary cardiologist, Dr. Capps    Cardiology (Dr. Delgado) follow-up appreciated -- LHC+RHC and TOM planned 8/16 AM  -may consider attempting palliative mitral clip as alternative to open surgery pending TOM imaging and cath finding  -possible hospice -- palliative care consulted    Severe mitral regurgitation  Assessment & Plan  As noted in dyspnea  Prior mitral clip    JESSICA (acute kidney injury) ([facility])  Assessment & Plan  JESSICA on CKD IV  Suspect will worsen with heart cath contrast  Minimize other nephrotoxic agents, renally dose meds    Demand ischemia ([facility])  Assessment & Plan  SOB, no chest pain  Already planned for heart cath  JESSICA also contributory    Moderate aortic regurgitation  Assessment & Plan  As noted in dyspnea    Chronic diastolic heart failure (HCC)- (present on admission)  Assessment & Plan  Resume CHF meds when appropriate    History of repair of mitral valve- (present on admission)  Assessment & Plan  H/o at [facility]    AF (atrial fibrillation) ([facility])- (present on admission)  Assessment & Plan  Rate: CCB    OAC: hold warfarin for now as planned for cath -- resume tomorrow    Gastrointestinal hemorrhage with melena- (present on admission)  Assessment & Plan  H/o, no GIB at this time, Hgb 15.3    ACP (advance care planning)  Assessment & Plan  Plan of care and rationale for hospitalization discussed with patient  DNR/DNI status confirmed    Severe mitral valve regurgitation as well as moderate aortic valve  regurgitation --patient is a high surgical candidate as well as history of GI bleed --high probability and likelihood to decline without intervention and at risk of death with intervention as well    Palliative care consulted for further assistance with ACP    ACP: 20 minutes    GERD (gastroesophageal reflux disease)  Assessment & Plan  pepcid    Malnutrition (HCC)- (present on admission)  Assessment & Plan  Folic acid    S/P placement of cardiac pacemaker-MDT Micra leadless PPM- (present on admission)  Assessment & Plan  h/o    Vitamin d deficiency  Assessment & Plan  supplement    Hypothyroidism- (present on admission)  Assessment & Plan  Synthroid    Hyperlipidemia- (present on admission)  Assessment & Plan  statin      VTE prophylaxis: SCD

## 2021-08-16 NOTE — ED PROVIDER NOTES
"ED Provider Note    CHIEF COMPLAINT  Chief Complaint   Patient presents with   • Shortness of Breath     Pt states she has had SOB x1 week. Pt has no hx of lung issues. Pt states \"she has been trying to get open heart surgery for her valves\". Pt is vaccinated for covid.    • Loss of Appetite     Pt states she has been \"hardly eating and drinking\". Pt states she \"has all this phlem that is keeping her from eating/drinking\".        HPI  Lindsay Landrum is a 88 y.o. female who presents with multiple complaints.  Patient has a history of atrial fibrillation, valvular heart disease, hypothyroidism, who presents with multiple complaints.  Patient reports that she has had a loss of appetite over the last year.  She reports that she just does not eat or drink much at all.  She reports that she has been very anxious and whenever she starts eating she just cannot get herself to eat more.  She denies any dysphagia or odynophagia. patient denies any associated abdominal pain.  Patient denies any associated chest pain.  She reports some decreased exertional tolerance.  Patient reports decreased exertional tolerance worsened over months.  Patient's last echo was done in October 2020, showed an estimated ejection fraction of 45 to 50%, she was status post transcatheter mitral valve repair and had severe mitral valve regurgitation.  Patient denies any fevers or chills.  Patient reports hearing very stuffed up.    REVIEW OF SYSTEMS  ROS  See HPI for further details. All other systems are negative.     PAST MEDICAL HISTORY   has a past medical history of AF (atrial fibrillation) (HCC) (5/21/2012), Anemia, Bowel habit changes, Breath shortness, Cataract, Chronic anticoagulation (5/21/2012), Heart burn, Heart valve disease, Hemorrhagic disorder (HCC), High cholesterol, History of blood transfusion, Hyperlipidemia (5/21/2012), Hypothyroidism (5/21/2012), Indigestion, Pacemaker, Urinary incontinence, and Vitamin d deficiency " (5/21/2012).    SOCIAL HISTORY  Social History     Tobacco Use   • Smoking status: Never Smoker   • Smokeless tobacco: Never Used   Vaping Use   • Vaping Use: Never used   Substance and Sexual Activity   • Alcohol use: Yes   • Drug use: No   • Sexual activity: Not on file       SURGICAL HISTORY   has a past surgical history that includes gyn surgery; other cardiac surgery; cataract extraction with iol; abdominal hysterectomy total; colonoscopy,diagnostic (N/A, 10/5/2020); gastroscopy (N/A, 10/5/2020); and colonoscopy with polyp (N/A, 10/5/2020).    CURRENT MEDICATIONS  Home Medications     Reviewed by Kelsey Luis R.N. (Registered Nurse) on 08/15/21 at 1414  Med List Status: Not Addressed   Medication Last Dose Status   CALCIUM PO  Active   Cyanocobalamin (VITAMIN B-12 PO)  Active   diltiazem (TIAZAC) 240 MG SR capsule  Active   famotidine (PEPCID) 10 MG tablet  Active   ferrous sulfate 325 (65 Fe) MG tablet  Active   folic acid (FOLVITE) 1 MG Tab  Active   furosemide (LASIX) 40 MG Tab  Active   levothyroxine (SYNTHROID) 75 MCG Tab  Active   MAGNESIUM PO  Active   NON SPECIFIED  Active   spironolactone (ALDACTONE) 25 MG Tab  Active   VITAMIN D PO  Active   warfarin (COUMADIN) 4 MG Tab  Active                ALLERGIES  No Known Allergies    PHYSICAL EXAM  Vitals:    08/15/21 1356   BP: 116/59   Pulse: 76   Resp: 18   Temp: 35.8 °C (96.5 °F)   SpO2: 93%       Physical Exam  Constitutional:       Appearance: She is well-developed.   HENT:      Head: Normocephalic and atraumatic.   Eyes:      Conjunctiva/sclera: Conjunctivae normal.   Cardiovascular:      Rate and Rhythm: Normal rate and regular rhythm.      Comments: 3/6 systolic murmur, no lower extremity edema  Pulmonary:      Effort: Pulmonary effort is normal.      Breath sounds: Normal breath sounds.   Abdominal:      General: Bowel sounds are normal. There is no distension.      Palpations: Abdomen is soft.      Tenderness: There is no abdominal  tenderness. There is no rebound.   Musculoskeletal:      Cervical back: Normal range of motion and neck supple.   Skin:     General: Skin is warm and dry.      Findings: No rash.   Neurological:      Mental Status: She is alert and oriented to person, place, and time.   Psychiatric:         Behavior: Behavior normal.       Results for orders placed or performed during the hospital encounter of 08/15/21   EC-ECHOCARDIOGRAM COMPLETE W/O CONT   Result Value Ref Range    Eject.Frac. MOD BP 51.25     Eject.Frac. MOD 4C 45.23     Eject.Frac. MOD 2C 55.44     Left Ventrical Ejection Fraction 45    CBC with Differential   Result Value Ref Range    WBC 8.9 4.8 - 10.8 K/uL    RBC 4.95 4.20 - 5.40 M/uL    Hemoglobin 15.3 12.0 - 16.0 g/dL    Hematocrit 48.2 (H) 37.0 - 47.0 %    MCV 97.4 81.4 - 97.8 fL    MCH 30.9 27.0 - 33.0 pg    MCHC 31.7 (L) 33.6 - 35.0 g/dL    RDW 59.0 (H) 35.9 - 50.0 fL    Platelet Count 250 164 - 446 K/uL    MPV 10.1 9.0 - 12.9 fL    Neutrophils-Polys 82.10 (H) 44.00 - 72.00 %    Lymphocytes 12.10 (L) 22.00 - 41.00 %    Monocytes 4.70 0.00 - 13.40 %    Eosinophils 0.20 0.00 - 6.90 %    Basophils 0.60 0.00 - 1.80 %    Immature Granulocytes 0.30 0.00 - 0.90 %    Nucleated RBC 0.00 /100 WBC    Neutrophils (Absolute) 7.28 (H) 2.00 - 7.15 K/uL    Lymphs (Absolute) 1.07 1.00 - 4.80 K/uL    Monos (Absolute) 0.42 0.00 - 0.85 K/uL    Eos (Absolute) 0.02 0.00 - 0.51 K/uL    Baso (Absolute) 0.05 0.00 - 0.12 K/uL    Immature Granulocytes (abs) 0.03 0.00 - 0.11 K/uL    NRBC (Absolute) 0.00 K/uL   Comp Metabolic Panel   Result Value Ref Range    Sodium 135 135 - 145 mmol/L    Potassium 4.9 3.6 - 5.5 mmol/L    Chloride 95 (L) 96 - 112 mmol/L    Co2 22 20 - 33 mmol/L    Anion Gap 18.0 (H) 7.0 - 16.0    Glucose 93 65 - 99 mg/dL    Bun 41 (H) 8 - 22 mg/dL    Creatinine 1.72 (H) 0.50 - 1.40 mg/dL    Calcium 10.2 8.5 - 10.5 mg/dL    AST(SGOT) 36 12 - 45 U/L    ALT(SGPT) 11 2 - 50 U/L    Alkaline Phosphatase 116 (H) 30 - 99  U/L    Total Bilirubin 0.9 0.1 - 1.5 mg/dL    Albumin 4.4 3.2 - 4.9 g/dL    Total Protein 8.2 6.0 - 8.2 g/dL    Globulin 3.8 (H) 1.9 - 3.5 g/dL    A-G Ratio 1.2 g/dL   ESTIMATED GFR   Result Value Ref Range    GFR If  34 (A) >60 mL/min/1.73 m 2    GFR If Non  28 (A) >60 mL/min/1.73 m 2   proBrain Natriuretic Peptide, NT   Result Value Ref Range    NT-proBNP 2204 (H) 0 - 125 pg/mL   TROPONIN   Result Value Ref Range    Troponin T 25 (H) 6 - 19 ng/L   EKG (NOW)   Result Value Ref Range    Report       [facility] Emergency Dept.    Test Date:  2021-08-15  Pt Name:    AZUCENA SERRATO                  Department: ER  MRN:        [id number]                      Room:  Gender:     Female                       Technician: [id number]  :        [id number]                   Requested By:ER TRIAGE PROTOCOL  Order #:    [id number]                    Reading MD: Catherine Duarte MD    Measurements  Intervals                                Axis  Rate:       70                           P:  MA:                                      QRS:        -71  QRSD:       140                          T:          135  QT:         464  QTc:        501    Interpretive Statements  Atrial flutter with ventricular paced rhythm versus escape junctional rhythm,  unchanged from EKG done 2021.  No ST elevation or depression consistent  with acute regional ischemia  Electronically Signed On 8- 18:14:38 PDT by Catherine Duarte MD       EC-ECHOCARDIOGRAM COMPLETE W/O CONT   Final Result      DX-CHEST-PORTABLE (1 VIEW)   Final Result      1.  Stable moderate cardiomegaly.   2.  Minimal RIGHT pleural fluid versus pleural reactive change, improved from prior.   3.  No pneumonia or overt pulmonary edema.            COURSE & MEDICAL DECISION MAKING  Pertinent Labs & Imaging studies reviewed. (See chart for details)    Patient here with severe mitral regurgitation, with symptoms consistent with worsening disease.  Basic labs will be checked, troponin and BNP for further work-up.  Will check chest x-ray as well.  I have also had patient's AICD interrogated, there are no issues with the AICD or events; although the technician does advise that when patient gets discharged to potentially consider increasing the rate especially when patient is ambulatory.  Patient case discussed with cardiology who will check echo here in the emergency department for further risk stratification.  Patient echo reveals significant severe mitral regurg and severe right reticular and atrial enlargement.  Patient is apparently going to undergo possible valve replacement at [place], cardiology would like patient admitted for cath and EGD.  Patient with mild JESSICA likely secondary to poor p.o. intake and possible overaggressive Lasix.  Patient will be admitted for further work-up, I have discussed the case with hospitalist who is agreed to place in observation.     The patient will return for worsening symptoms and is stable at the time of discharge. The patient verbalizes understanding and will comply.    FINAL IMPRESSION  1.  Severe mitral regurgitation, orthopnea, dyspnea on exertion, mild JESSICA      Electronically signed by: Gerald Alexander M.D., 8/15/2021 6:08 PM

## 2021-08-16 NOTE — ED NOTES
Pt was given box lunch, pt is NPO at midnight  Pt shows no s/s of distress. Will continue to monitor.

## 2021-08-16 NOTE — PATIENT COMMUNICATION
Attempted to call pt, unable to reach.  Left voicemail to return this call at their earliest convenience.    3sun message sent to pt regarding advise.

## 2021-08-16 NOTE — ASSESSMENT & PLAN NOTE
Currently looks euvolemic, not hypoxemic and denies complaints  Resume CHF meds when appropriate.  Plan to add Lasix as she begins oral diet.

## 2021-08-16 NOTE — ANESTHESIA TIME REPORT
Anesthesia Start and Stop Event Times     Date Time Event    8/16/2021 1342 Ready for Procedure     1352 Anesthesia Start     1420 Anesthesia Stop        Responsible Staff  08/16/21    Name Role Begin End    Baldo Goodson M.D. Anesth 1352 1420        Preop Diagnosis (Free Text):  Pre-op Diagnosis             Preop Diagnosis (Codes):    Post op Diagnosis  Mitral regurgitation      Premium Reason  Non-Premium    Comments:

## 2021-08-16 NOTE — THERAPY
08/16/21 1456   Interdisciplinary Plan of Care Collaboration   Collaboration Comments OT referral received. Pt unavailable due to undergoing TOM. Will attempt post-procedure.

## 2021-08-16 NOTE — ASSESSMENT & PLAN NOTE
On the following supplements:  Folic acid  Ferrous gluconate   Vit D  Monitor and replace electrolytes as needed.

## 2021-08-16 NOTE — ASSESSMENT & PLAN NOTE
Multifactorial-severe MR status post mitral clip with recurrent severe MR and moderate AR  Stat TTE: 45% EF, severe MR, moderate AR, moderate TR  She has been referred to [facility] for OR intervention by primary cardiologist, Dr. Capps  Cardiology (Dr. Delgado) follow-up appreciated --  TOM done 8/16/2021: Awaiting results  She wants to know her options before committing definitely for surgery.

## 2021-08-16 NOTE — ANESTHESIA POSTPROCEDURE EVALUATION
Patient: Lindsay Landrum    Procedure Summary     Date: 08/16/21 Room / Location: [facility] - ECHOCARDIOLOGY [facility]    Anesthesia Start: 1352 Anesthesia Stop: 1420    Procedure: EC-TOM W/O CONT Diagnosis:       Unstable angina (HCC)      Unstable angina (HCC)    Scheduled Providers: Yury Mckenna M.D.; Pj Calderon M.D. Responsible Provider: Baldo Goodson M.D.    Anesthesia Type: MAC ASA Status: 3          Final Anesthesia Type: MAC  Last vitals  BP   Blood Pressure : (P) 103/54    Temp   (P) 36.4 °C (97.5 °F)    Pulse   (P) 70   Resp   (P) 14    SpO2   98 %      Anesthesia Post Evaluation    Patient location during evaluation: PACU  Patient participation: complete - patient participated  Level of consciousness: awake and alert  Pain score: 0    Airway patency: patent  Anesthetic complications: no  Cardiovascular status: hemodynamically stable  Respiratory status: acceptable  Hydration status: euvolemic    PONV: none          No complications documented.     Nurse Pain Score: 0 (NPRS)

## 2021-08-17 ENCOUNTER — APPOINTMENT (OUTPATIENT)
Dept: RADIOLOGY | Facility: MEDICAL CENTER | Age: 86
DRG: 307 | End: 2021-08-17
Attending: STUDENT IN AN ORGANIZED HEALTH CARE EDUCATION/TRAINING PROGRAM
Payer: MEDICARE

## 2021-08-17 ENCOUNTER — APPOINTMENT (OUTPATIENT)
Dept: CARDIOLOGY | Facility: MEDICAL CENTER | Age: 86
DRG: 307 | End: 2021-08-17
Attending: INTERNAL MEDICINE
Payer: MEDICARE

## 2021-08-17 ENCOUNTER — APPOINTMENT (OUTPATIENT)
Dept: CARDIOLOGY | Facility: MEDICAL CENTER | Age: 86
End: 2021-08-17
Attending: INTERNAL MEDICINE
Payer: MEDICARE

## 2021-08-17 PROBLEM — Z95.0 S/P PLACEMENT OF CARDIAC PACEMAKER: Status: RESOLVED | Noted: 2020-08-04 | Resolved: 2021-08-17

## 2021-08-17 PROBLEM — R73.03 PRE-DIABETES: Status: ACTIVE | Noted: 2021-08-17

## 2021-08-17 PROBLEM — Z71.89 ACP (ADVANCE CARE PLANNING): Status: RESOLVED | Noted: 2021-08-15 | Resolved: 2021-08-17

## 2021-08-17 PROBLEM — I34.0 SEVERE MITRAL REGURGITATION: Status: RESOLVED | Noted: 2020-10-04 | Resolved: 2021-08-17

## 2021-08-17 LAB
ALBUMIN SERPL BCP-MCNC: 3.3 G/DL (ref 3.2–4.9)
ALBUMIN/GLOB SERPL: 1.2 G/DL
ALP SERPL-CCNC: 75 U/L (ref 30–99)
ALT SERPL-CCNC: 9 U/L (ref 2–50)
ANION GAP SERPL CALC-SCNC: 10 MMOL/L (ref 7–16)
AST SERPL-CCNC: 18 U/L (ref 12–45)
BASOPHILS # BLD AUTO: 0.4 % (ref 0–1.8)
BASOPHILS # BLD: 0.04 K/UL (ref 0–0.12)
BILIRUB SERPL-MCNC: 0.7 MG/DL (ref 0.1–1.5)
BUN SERPL-MCNC: 33 MG/DL (ref 8–22)
CALCIUM SERPL-MCNC: 8.4 MG/DL (ref 8.5–10.5)
CHLORIDE SERPL-SCNC: 101 MMOL/L (ref 96–112)
CO2 SERPL-SCNC: 26 MMOL/L (ref 20–33)
CREAT SERPL-MCNC: 1.45 MG/DL (ref 0.5–1.4)
EOSINOPHIL # BLD AUTO: 0.04 K/UL (ref 0–0.51)
EOSINOPHIL NFR BLD: 0.4 % (ref 0–6.9)
ERYTHROCYTE [DISTWIDTH] IN BLOOD BY AUTOMATED COUNT: 60.5 FL (ref 35.9–50)
GLOBULIN SER CALC-MCNC: 2.7 G/DL (ref 1.9–3.5)
GLUCOSE BLD-MCNC: 118 MG/DL (ref 65–99)
GLUCOSE BLD-MCNC: 62 MG/DL (ref 65–99)
GLUCOSE BLD-MCNC: 73 MG/DL (ref 65–99)
GLUCOSE BLD-MCNC: 89 MG/DL (ref 65–99)
GLUCOSE BLD-MCNC: 95 MG/DL (ref 65–99)
GLUCOSE SERPL-MCNC: 116 MG/DL (ref 65–99)
HCT VFR BLD AUTO: 39.7 % (ref 37–47)
HGB BLD-MCNC: 12.6 G/DL (ref 12–16)
IMM GRANULOCYTES # BLD AUTO: 0.05 K/UL (ref 0–0.11)
IMM GRANULOCYTES NFR BLD AUTO: 0.5 % (ref 0–0.9)
LYMPHOCYTES # BLD AUTO: 1.25 K/UL (ref 1–4.8)
LYMPHOCYTES NFR BLD: 13.4 % (ref 22–41)
MAGNESIUM SERPL-MCNC: 2.3 MG/DL (ref 1.5–2.5)
MCH RBC QN AUTO: 31.5 PG (ref 27–33)
MCHC RBC AUTO-ENTMCNC: 31.7 G/DL (ref 33.6–35)
MCV RBC AUTO: 99.3 FL (ref 81.4–97.8)
MONOCYTES # BLD AUTO: 0.7 K/UL (ref 0–0.85)
MONOCYTES NFR BLD AUTO: 7.5 % (ref 0–13.4)
NEUTROPHILS # BLD AUTO: 7.27 K/UL (ref 2–7.15)
NEUTROPHILS NFR BLD: 77.8 % (ref 44–72)
NRBC # BLD AUTO: 0 K/UL
NRBC BLD-RTO: 0 /100 WBC
PHOSPHATE SERPL-MCNC: 2.8 MG/DL (ref 2.5–4.5)
PLATELET # BLD AUTO: 181 K/UL (ref 164–446)
PMV BLD AUTO: 11.1 FL (ref 9–12.9)
POTASSIUM SERPL-SCNC: 4.2 MMOL/L (ref 3.6–5.5)
PROT SERPL-MCNC: 6 G/DL (ref 6–8.2)
RBC # BLD AUTO: 4 M/UL (ref 4.2–5.4)
SODIUM SERPL-SCNC: 137 MMOL/L (ref 135–145)
WBC # BLD AUTO: 9.4 K/UL (ref 4.8–10.8)

## 2021-08-17 PROCEDURE — 770020 HCHG ROOM/CARE - TELE (206)

## 2021-08-17 PROCEDURE — 99232 SBSQ HOSP IP/OBS MODERATE 35: CPT | Mod: GC | Performed by: INTERNAL MEDICINE

## 2021-08-17 PROCEDURE — 83735 ASSAY OF MAGNESIUM: CPT

## 2021-08-17 PROCEDURE — A9270 NON-COVERED ITEM OR SERVICE: HCPCS | Performed by: STUDENT IN AN ORGANIZED HEALTH CARE EDUCATION/TRAINING PROGRAM

## 2021-08-17 PROCEDURE — 92610 EVALUATE SWALLOWING FUNCTION: CPT

## 2021-08-17 PROCEDURE — 36415 COLL VENOUS BLD VENIPUNCTURE: CPT

## 2021-08-17 PROCEDURE — 74210 X-RAY XM PHRNX&/CRV ESOPH C+: CPT

## 2021-08-17 PROCEDURE — 700102 HCHG RX REV CODE 250 W/ 637 OVERRIDE(OP): Performed by: STUDENT IN AN ORGANIZED HEALTH CARE EDUCATION/TRAINING PROGRAM

## 2021-08-17 PROCEDURE — 85025 COMPLETE CBC W/AUTO DIFF WBC: CPT

## 2021-08-17 PROCEDURE — 84100 ASSAY OF PHOSPHORUS: CPT

## 2021-08-17 PROCEDURE — 80053 COMPREHEN METABOLIC PANEL: CPT

## 2021-08-17 PROCEDURE — 99233 SBSQ HOSP IP/OBS HIGH 50: CPT | Performed by: INTERNAL MEDICINE

## 2021-08-17 PROCEDURE — 700105 HCHG RX REV CODE 258: Performed by: STUDENT IN AN ORGANIZED HEALTH CARE EDUCATION/TRAINING PROGRAM

## 2021-08-17 PROCEDURE — 97165 OT EVAL LOW COMPLEX 30 MIN: CPT

## 2021-08-17 PROCEDURE — 82962 GLUCOSE BLOOD TEST: CPT | Mod: 91

## 2021-08-17 PROCEDURE — 97161 PT EVAL LOW COMPLEX 20 MIN: CPT

## 2021-08-17 RX ORDER — FUROSEMIDE 40 MG/1
40 TABLET ORAL DAILY
Status: DISCONTINUED | OUTPATIENT
Start: 2021-08-17 | End: 2021-08-18 | Stop reason: HOSPADM

## 2021-08-17 RX ORDER — DILTIAZEM HYDROCHLORIDE 180 MG/1
180 CAPSULE, COATED, EXTENDED RELEASE ORAL EVERY MORNING
Status: DISCONTINUED | OUTPATIENT
Start: 2021-08-18 | End: 2021-08-17

## 2021-08-17 RX ORDER — DEXTROSE MONOHYDRATE 50 MG/ML
INJECTION, SOLUTION INTRAVENOUS CONTINUOUS
Status: DISCONTINUED | OUTPATIENT
Start: 2021-08-17 | End: 2021-08-17

## 2021-08-17 RX ORDER — DILTIAZEM HYDROCHLORIDE 180 MG/1
180 CAPSULE, COATED, EXTENDED RELEASE ORAL EVERY MORNING
Status: DISCONTINUED | OUTPATIENT
Start: 2021-08-18 | End: 2021-08-18 | Stop reason: HOSPADM

## 2021-08-17 RX ADMIN — INSULIN LISPRO 3 UNITS: 100 INJECTION, SOLUTION INTRAVENOUS; SUBCUTANEOUS at 08:53

## 2021-08-17 RX ADMIN — DILTIAZEM HYDROCHLORIDE 240 MG: 240 CAPSULE, COATED, EXTENDED RELEASE ORAL at 06:10

## 2021-08-17 RX ADMIN — FUROSEMIDE 40 MG: 40 TABLET ORAL at 17:57

## 2021-08-17 RX ADMIN — DOCUSATE SODIUM 50 MG AND SENNOSIDES 8.6 MG 2 TABLET: 8.6; 5 TABLET, FILM COATED ORAL at 17:57

## 2021-08-17 RX ADMIN — LEVOTHYROXINE SODIUM 75 MCG: 0.07 TABLET ORAL at 06:10

## 2021-08-17 RX ADMIN — DEXTROSE MONOHYDRATE: 50 INJECTION, SOLUTION INTRAVENOUS at 02:16

## 2021-08-17 RX ADMIN — FERROUS GLUCONATE 324 MG: 324 TABLET ORAL at 08:57

## 2021-08-17 ASSESSMENT — ENCOUNTER SYMPTOMS
COUGH: 0
WHEEZING: 0
PALPITATIONS: 0
VOMITING: 0
DIZZINESS: 0
SHORTNESS OF BREATH: 0
EYE DISCHARGE: 0
CHILLS: 0
FEVER: 0
NAUSEA: 0
BRUISES/BLEEDS EASILY: 1
HEARTBURN: 0
HEMOPTYSIS: 0
BLURRED VISION: 0
PND: 0
ORTHOPNEA: 1

## 2021-08-17 ASSESSMENT — GAIT ASSESSMENTS
GAIT LEVEL OF ASSIST: SUPERVISED
DISTANCE (FEET): 20
DISTANCE (FEET): 20

## 2021-08-17 ASSESSMENT — COGNITIVE AND FUNCTIONAL STATUS - GENERAL
CLIMB 3 TO 5 STEPS WITH RAILING: A LITTLE
HELP NEEDED FOR BATHING: A LITTLE
WALKING IN HOSPITAL ROOM: A LITTLE
SUGGESTED CMS G CODE MODIFIER DAILY ACTIVITY: CI
MOBILITY SCORE: 22
DAILY ACTIVITIY SCORE: 23
SUGGESTED CMS G CODE MODIFIER MOBILITY: CJ

## 2021-08-17 ASSESSMENT — FIBROSIS 4 INDEX: FIB4 SCORE: 2.92

## 2021-08-17 ASSESSMENT — ACTIVITIES OF DAILY LIVING (ADL): TOILETING: INDEPENDENT

## 2021-08-17 ASSESSMENT — PAIN DESCRIPTION - PAIN TYPE: TYPE: ACUTE PAIN

## 2021-08-17 NOTE — PROGRESS NOTES
Monitor summary:      Rhythm: Paced   Rate: 69  Ectopy: none  Measurements: n/a      12hr chart check    No monitor strips available for upload. Please call [phone] for printed strips.

## 2021-08-17 NOTE — PROGRESS NOTES
Daily Progress Note:     Date of Service: 8/17/2021  Primary Team: [employer] IM White Team   Attending: Ceasar Saeed M.D.   Senior Resident: Dr. Radha Sy  Intern: Dr. Penny Ojeda  Contact:  [phone]    Chief Complaint:   Shortness of breath, dyspnea at rest and worsened with exertion.   89 yo female with h/o A fibb on warfarin & pacemaker,   severe MR status post mitral clip with recurrent severe MR and   moderate AR who was recently referred to [facility] for OR intervention presented with SOB worse on exertion.     Subjective:  No acute events overnight. Denies any chest pain, SOB, fever, chills. Didn't have the increased phlem since was NPO. Complains of a lot of phlem- -if eat/drink anything, the amount of phlem increases and she gets a feeling of food getting stuck.    Consultants/Specialty:  Cardiology   Review of Systems:   Review of Systems   Constitutional: Positive for malaise/fatigue. Negative for chills and fever.   HENT: Negative for hearing loss.    Eyes: Negative for blurred vision and discharge.   Respiratory: Negative for cough, hemoptysis, shortness of breath and wheezing.         Complains of increased phlem   Cardiovascular: Positive for orthopnea. Negative for chest pain, palpitations and PND.        SOB with little activity (going to the bathroom).  No SOB at rest.   Gastrointestinal: Negative for heartburn, nausea and vomiting.        Complains of the feeling of food getting stuck.    Genitourinary: Negative for dysuria.   Skin: Negative for itching and rash.   Neurological: Negative for dizziness.   Endo/Heme/Allergies: Bruises/bleeds easily.       Objective Data:   Vitals:   Temp:  [36.1 °C (97 °F)-36.7 °C (98.1 °F)] 36.5 °C (97.7 °F)  Pulse:  [69-71] 69  Resp:  [12-20] 16  BP: ()/(49-65) 96/57  SpO2:  [95 %-99 %] 97 %  Physical Exam:   Physical Exam  Vitals and nursing note reviewed.   Constitutional:       General: She is not in acute distress.     Appearance: She  is not ill-appearing, toxic-appearing or diaphoretic.      Comments: Patient looks weak, malnourished    HENT:      Head: Normocephalic and atraumatic.      Right Ear: External ear normal.      Left Ear: External ear normal.      Nose: Nose normal. No congestion.      Mouth/Throat:      Mouth: Mucous membranes are moist.   Eyes:      General:         Right eye: No discharge.         Left eye: No discharge.   Cardiovascular:      Rate and Rhythm: Rhythm irregular.      Comments: A systolic murmer was noted on auscultation.  Pulses were weak.  Pulmonary:      Effort: Pulmonary effort is normal. No respiratory distress.      Breath sounds: Normal breath sounds. No stridor. No wheezing.   Abdominal:      General: Abdomen is flat. Bowel sounds are normal. There is no distension.      Palpations: Abdomen is soft.      Tenderness: There is no abdominal tenderness. There is no guarding.   Musculoskeletal:         General: No swelling.      Right lower leg: No edema.      Left lower leg: No edema.      Comments: Swelling on the fingers decreased today.  Patient has Raynauds.   Skin:     General: Skin is warm.      Coloration: Skin is not jaundiced.   Neurological:      Mental Status: She is alert and oriented to person, place, and time.         Labs:   Recent Labs     08/15/21  1511 08/16/21  0339 08/17/21  0551   WBC 8.9 7.5 9.4   RBC 4.95 4.68 4.00*   HEMOGLOBIN 15.3 14.5 12.6   HEMATOCRIT 48.2* 45.1 39.7   MCV 97.4 96.4 99.3*   MCH 30.9 31.0 31.5   RDW 59.0* 58.4* 60.5*   PLATELETCT 250 206 181   MPV 10.1 10.1 11.1   NEUTSPOLYS 82.10* 75.10* 77.80*   LYMPHOCYTES 12.10* 16.60* 13.40*   MONOCYTES 4.70 6.40 7.50   EOSINOPHILS 0.20 0.80 0.40   BASOPHILS 0.60 0.80 0.40     Recent Labs     08/15/21  1511 08/16/21  0339 08/17/21  0827   SODIUM 135 139 137   POTASSIUM 4.9 4.5 4.2   CHLORIDE 95* 99 101   CO2 22 24 26   GLUCOSE 93 88 116*   BUN 41* 42* 33*   CPKTOTAL  --  69  --        Imaging:   CT CHEST (THORAX) 8/15/2021  1.  No  acute abnormality in the chest.  2.  Chronic atelectasis/scarring in the right lower lung.  3.  Cardiomegaly with right atrial enlargement.  4.  Transcatheter mitral valve repair. Metallic device in the right ventricle.  DX CHEST PORTABLE 8/15/2021  1.  Stable moderate cardiomegaly.  2.  Minimal RIGHT pleural fluid versus pleural reactive change, improved from prior.  3.  No pneumonia or overt pulmonary edema.    Echo 8/15/2021 (From Cardiology) :  CONCLUSIONS  Left ventricular systolic function is mildly decreased.  Left ventricular ejection fraction is visually estimated to be 45%.  Global hypokinesis.  Mildly reduced right ventricular systolic function.  Severe right atrial enlargement.  Dilated right ventricle.  Severe right atrial enlargement.  Severely dilated left atrium.  Known transcatheter mitral valve clip repair with appropriate   transvalvular gradients.  Severe mitral regurgitation.  Moderate tricuspid regurgitation.  Right atrial pressure is estimated to be 8 mmHg.  Estimated right ventricular systolic pressure  is 30 mmHg.    EC-TOM W/O CONT         EC-ECHOCARDIOGRAM COMPLETE W/O CONT   Final Result      DX-CHEST-PORTABLE (1 VIEW)   Final Result      1.  Stable moderate cardiomegaly.   2.  Minimal RIGHT pleural fluid versus pleural reactive change, improved from prior.   3.  No pneumonia or overt pulmonary edema.      DX-ESOPH. FLUORO (BARIUM SWALLOW)    (Results Pending)      SPEECH THERAPY 8/17/2021:  1)patient appears to be able to continue regular diet w/ thin liquids. Patient was educated on small bites/sips, slow feeding rate, keeping HOB to at least 45 degrees for 30 minutes after PO intake, etc.   2.) Strongly agree with plan for esophagram today to further assess symptoms of esophageal dysphagia. Consider GI consult depending on results.   3.) SLP to follow 1-2 times during admit for continued diet tolerance.   Plan  Recommend Speech Therapy 3 times per week until therapy goals are met for  the following treatments:  Dysphagia Training and Patient / Family / Caregiver Education.  Discharge Recommendations: Anticipate that the patient will have no further speech therapy needs after discharge from the hospital    PT CONSULTED  Pending review    Esophageal dysphagia  Assessment & Plan  Patient complains of the feeling of food getting stuck in her throat  Increased phlem after eating/drinking anything  According to speech path the dysphagia seems more esophageal in nature,   Barium Esophagogram pending today.   Will consult G.I and f/u according to results     Dyspnea  Assessment & Plan  Multifactorial-severe MR status post mitral clip with recurrent severe MR and moderate AR  Stat TTE: 45% EF, severe MR, moderate AR, moderate TR  She has been referred to [facility] for OR intervention by primary cardiologist, Dr. Capps  Cardiology (Dr. Delgado) follow-up appreciated --  TOM done 8/16/2021: Awaiting results  She wants to know her options before committing definitely for surgery.        Chronic diastolic heart failure (HCC)- (present on admission)  Assessment & Plan  Currently looks euvolemic, not hypoxemic and denies complaints  Resume CHF meds when appropriate.  Plan to add Lasix as she begins oral diet.    AF (atrial fibrillation) (HCC)- (present on admission)  Assessment & Plan  Rate: CCB, decreased the dose today because the B.P was running low.    OAC: warfarin was held, resume as appropriate    JESSICA (acute kidney injury) ([facility])  Assessment & Plan  JESSICA on CKD IV  Suspect will worsen with heart cath contrast, proceeding with the TOM for now (holding off on cardiac cath per cardiology)  Minimize other nephrotoxic agents, renally dose meds  Cr trending down    Moderate aortic regurgitation  Assessment & Plan  As noted in dyspnea    Severe mitral regurgitation  Assessment & Plan  As noted in dyspnea  Prior mitral clip    Vitamin d deficiency  Assessment &  Plan  supplement    Pre-diabetes  Assessment & Plan  Her HbA1c was 5.5 (8/16/2021)  On ISS in the hospital.     GERD (gastroesophageal reflux disease)- (present on admission)  Assessment & Plan  Continue famotidine    Demand ischemia (HCC)  Assessment & Plan  SOB, no chest pain  Heart cath post TOM if required  JESSICA also contributory    Malnutrition (HCC)- (present on admission)  Assessment & Plan  On the following supplements:  Folic acid  Ferrous gluconate   Vit D  Monitor and replace electrolytes as needed.    Gastrointestinal hemorrhage with melena- (present on admission)  Assessment & Plan  H/o, no GIB at this time, Hgb 15.3    History of repair of mitral valve  Assessment & Plan  H/o at [facility]    Hypothyroidism- (present on admission)  Assessment & Plan  Continue levothyroxine    Hyperlipidemia- (present on admission)  Assessment & Plan  Her LDL was raised at 118   consider starting a statin

## 2021-08-17 NOTE — THERAPY
"Speech Language Pathology   Clinical Swallow Evaluation     Patient Name: Lindsay Landrum  AGE:  88 y.o., SEX:  female  Medical Record #: [id number]  Today's Date: 8/17/2021     Precautions  Precautions: Fall Risk, Swallow Precautions ( See Comments)    Assessment  Patient is 88 y.o. female admitted with SOB and loss of appetite. S/p TOM yesterday. PMHx of A fib, pacemaker, severe MR s/p mitral clip. No previous SLP notes in EMR. Chest CT reports \"No acute abnormality in the chest. Chronic atelectasis/scarring in the right lower lung. Cardiomegaly with right atrial enlargement. Transcatheter mitral valve repair. Metallic device in the right ventricle.\" CXR reports \"Stable moderate cardiomegaly. Minimal RIGHT pleural fluid versus pleural reactive change, improved from prior.\"    Patient seen this date for clinical swallow evaluation. Patient was NPO yesterday for TOM and evaluation had to be pushed back until today. Patient was ordered a regular diet this AM by MD and seen with breakfast tray. Patient's daughter present at bedside and reported PO intake has significantly declined in the last 1-2 weeks. Patient denies any s/sx of aspiration, but reports symptoms of esophageal dysphagia, such as esophageal globus after 5-6 bites, belching, and buildup of clear mucous mid-way through meal. Patient consumed bites of applesauce, diced potatoes, english muffin, and thins via cup sip and straw. Patient consumed minimal amounts of each and then symptoms of esophageal dysphagia (same as her report) developed and patient declined any further PO trials d/t same. No overt s/sx of aspiration were noted. Laryngeal elevation palpated as complete. Initiation of swallow trigger was timely. No oral residue noted.     1.) At this time, patient appears to be able to continue regular diet w/ thin liquids. Patient was educated on small bites/sips, slow feeding rate, keeping HOB to at least 45 degrees for 30 minutes after PO intake, etc. "   2.) Strongly agree with plan for esophagram today to further assess symptoms of esophageal dysphagia. Consider GI consult depending on results.   3.) SLP to follow 1-2 times during admit for continued diet tolerance.     Plan  Recommend Speech Therapy 3 times per week until therapy goals are met for the following treatments:  Dysphagia Training and Patient / Family / Caregiver Education.    Discharge Recommendations: Anticipate that the patient will have no further speech therapy needs after discharge from the hospital     Objective     08/17/21 0950   Precautions   Precautions Fall Risk;Swallow Precautions ( See Comments)   Vitals   O2 (LPM) 0.5   O2 Delivery Device Silicone Nasal Cannula   Oral Motor Eval    Is Patient Able to Complete Oral Motor Eval Yes, Within Normal Limits   Laryngeal Function   Voice Quality Within Functional Limits   Volutional Cough Within Functional Limits   Excursion Upon Swallow Complete   Max Phonation Time (Seconds) 5   Oral Food Presentation   Single Swallow Thin (0) Within Functional Limits   Serial Swallow Thin (0) Within Functional Limits   Liquidised (3) Within Functional Limits   Soft & Bite-Sized (6) - (Dysphagia III) Within Functional Limits   Regular (7) Within Functional Limits   Regular-Easy to Chew (7) Within Functional Limits   Self Feeding Independent   Dysphagia Strategies / Recommendations   Strategies / Interventions Recommended (Yes / No) Yes   Compensatory Strategies Head of Bed 90 Degrees During Eating / Drinking;Head of Bed 45 Degrees After Meals;Single Sips / Bites   Diet / Liquid Recommendation Regular (7);Thin (0)   Medication Administration  Whole with Liquid Wash;Float Whole with Puree  (As tolerated )   Therapy Interventions Dysphagia Therapy By Speech Language Pathologist   Short Term Goals   Short Term Goal # 1 Patient will consume regular diet w/ thin liquids w/ no overt s/sx of aspiration.    Anticipated Discharge Needs   Discharge Recommendations  Anticipate that the patient will have no further speech therapy needs after discharge from the hospital

## 2021-08-17 NOTE — CARE PLAN
Problem: Nutritional:  Goal: Achieve adequate nutritional intake  Description: Patient will consume 50% of meals OR 25-50% of meals and 50% of supplements.  Outcome: Not Met

## 2021-08-17 NOTE — CARE PLAN
The patient is Stable - Low risk of patient condition declining or worsening    Shift Goals  Clinical Goals: Pending results of TOM  Patient Goals: Participate in POC  Family Goals: n/a    Progress made toward(s) clinical / shift goals:    Problem: Psychosocial  Goal: Patient's ability to verbalize feelings about condition will improve  Outcome: Progressing  Flowsheets (Taken 8/17/2021 0118)  Condition Will Improve:   Discussed coping with medical condition and its effects   Encouraged patient participation in care   Encouraged acknowledgement of body changes and emotions  Note: Pt questions addressed regarding NPO diet and indications for SLP eval the following morning. Pt agreeable and encouraged to participate in care with primary team.     Problem: Hemodynamics  Goal: Patient's hemodynamics, fluid balance and neurologic status will be stable or improve  Outcome: Progressing       Patient is not progressing towards the following goals: n/a

## 2021-08-17 NOTE — PROGRESS NOTES
Report received from day shift RN, assumed patient care. Patient is calmly resting in bed, no signs of distress, even and unlabored breathing noted. Pt on room air. Tele box on and in place. Patient has call light within reach, fall precautions in place. Will continue to monitor.

## 2021-08-17 NOTE — THERAPY
08/17/21 1448   Interdisciplinary Plan of Care Collaboration   Collaboration Comments Attempted OT eval. Pt with bed rest order. Cannot initiate OT eval until bed rest discontinued. Notified resident via Voalte.

## 2021-08-17 NOTE — PROGRESS NOTES
Received report from Day shift RN. Assumed care of patient at 1900. Patient A/Ox4 at this this time. On .5L O2, no signs of respiratory distress.  of pain or discomfort. Updated on POC and indication for NPO status. Call light and belongings within reach. Bed in lowest locked position. Upper side rails raised. Hourly rounding in place. Will continue to monitor.

## 2021-08-17 NOTE — PROGRESS NOTES
Paged [facility] resident, Kareem. Pt has been NPO >24hours, BG dropped to 100 on assessment and pt hypotensive on re-assessment of vitals. Updates provided. Order received for continuous IVF. See MAR for update.    Vitals:    08/17/21 0152   BP: (!) 91/50   Pulse: 71   Resp: 12   Temp: 36.7 °C (98.1 °F)   SpO2: 97%

## 2021-08-17 NOTE — PROGRESS NOTES
[facility] Follow-up Note    Date of Service:    8/17/2021      Name:   Lindsay Landrum   YOB: 1932  Age:   88 y.o.  female   MRN:   [id number]      Chief Complaint: dyspnea on exertion    Primary Cardiologist:  Dr. Capps    HPI:  Ms. Landrum is an 89 y/o lady with PMH including paroxysmal atrial fibrillation on warfarin, SSS s/p Micra PPM 2020, severe MR s/p Mitraclip [facility] 2018 now with recurrent MR, moderate AI, Dyslipidemia, Essential hypertension, hx of GIB who presented to [facility] on 8/15/2021 with shortness of breath and worsening dyspnea with exertion.       She was evaluated at [place] recently and is planning on having open valve replacement.  She had plans for out patient TOM, R/LHC.    Interim Events:  She is sleeping laying flat on her back this morning   Breathing has improved, no LE swelling.    ROS  Constitutional:  denies fatigue.  Respiratory:  Denies shortness of breath at rest today, no cough.  Cardiovascular:  Denies chest pain.  no lower extremity edema.  Denies orthopnea or PND.  : + polyuria, no dysuria.  GI:  Denies nausea/vomiting.  No abdominal distention. + anorexia.  Neuro:  Denies dizziness, syncope.  Hem/lymph: Denies easy bleeding/bruising.      All other review of systems reviewed and negative.    Past medical, surgical, social, and family history reviewed and unchanged from admission except as noted in HPI.    Medications: Reviewed in MAR  Current Facility-Administered Medications   Medication Dose Frequency Provider Last Rate Last Admin   • [START ON 8/18/2021] DILTIAZem CD (CARDIZEM CD) capsule 180 mg  180 mg QAM Radha Sy M.D.       • [Held by provider] insulin lispro (AdmeLOG) injection  0.2 Units/kg/day TID AC Radha Sy M.D.   3 Units at 08/17/21 0853    And   • insulin lispro (AdmeLOG) injection  1-6 Units 4X/DAY ACHS Radha Sy M.D.        And   • glucose 4 g chewable  tablet 16 g  16 g Q15 MIN PRN Radha Sy M.D.        And   • dextrose 50% (D50W) injection 50 mL  50 mL Q15 MIN PRN Radha Sy M.D.       • senna-docusate (PERICOLACE or SENOKOT S) 8.6-50 MG per tablet 2 Tablet  2 Tablet BID Jordan Franco M.D.   2 Tablet at 08/16/21 0555    And   • polyethylene glycol/lytes (MIRALAX) PACKET 1 Packet  1 Packet QDAY PRN Jordan Franco M.D.        And   • magnesium hydroxide (MILK OF MAGNESIA) suspension 30 mL  30 mL QDAY PRN Jordan Franco M.D.        And   • bisacodyl (DULCOLAX) suppository 10 mg  10 mg QDAY PRN Jordan Franco M.D.       • enalaprilat (VASOTEC) injection 1.25 mg  1.25 mg Q6HRS PRN Jordan Franco M.D.       • labetalol (NORMODYNE/TRANDATE) injection 10 mg  10 mg Q4HRS PRN Jordan Franco M.D.       • acetaminophen (Tylenol) tablet 650 mg  650 mg Q6HRS PRN Jordan Franco M.D.       • nitroglycerin (NITROSTAT) tablet 0.4 mg  0.4 mg Q5 MIN PRN Jordan Franco M.D.       • morphine (pf) 4 mg/mL injection 2-4 mg  2-4 mg Q5 MIN PRN Jordan Franco M.D.       • ondansetron (ZOFRAN) syringe/vial injection 4 mg  4 mg Q4HRS PRN Jordan Fracno M.D.       • ondansetron (ZOFRAN ODT) dispertab 4 mg  4 mg Q4HRS PRN Jordan Franco M.D.       • guaiFENesin dextromethorphan (ROBITUSSIN DM) 100-10 MG/5ML syrup 10 mL  10 mL Q6HRS PRN Jordan Franco M.D.       • famotidine (PEPCID) tablet 10 mg  10 mg DAILY Jordan Franco M.D.   10 mg at 08/16/21 0554   • folic acid (FOLVITE) tablet 1 mg  1 mg DAILY Jordan Franco M.D.   1 mg at 08/16/21 0554   • [Held by provider] furosemide (LASIX) tablet 40 mg  40 mg DAILY Jordan Franco M.D.       • levothyroxine (SYNTHROID) tablet 75 mcg  75 mcg QAM Jordan Franco M.D.   75 mcg at 08/17/21 0610   • [Held by provider] spironolactone (ALDACTONE) tablet 25 mg  25 mg DAILY Jordan Franco M.D.       • ferrous gluconate (FERGON) tablet 324 mg  324 mg QDAY with Breakfast Jordan Franco M.D.   324 mg at 08/17/21 0857   • vitamin D (cholecalciferol)  "tablet 1,000 Units  1,000 Units DAILY Jordan Franco M.D.   1,000 Units at 08/16/21 0553   Last reviewed on 8/15/2021  8:38 PM by Naima Fernandez, Pharmacy Intern    No Known Allergies    Physical Exam  Body mass index is 18.35 kg/m². BP (!) 96/57   Pulse 69   Temp 36.5 °C (97.7 °F) (Temporal)   Resp 16   Ht 1.575 m (5' 2\")   Wt 45.5 kg (100 lb 5 oz)   SpO2 97%    Vitals:    08/17/21 0152 08/17/21 0600 08/17/21 0755 08/17/21 1112   BP: (!) 91/50 (!) 97/49 (!) 94/55 (!) 96/57   Pulse: 71 70 70 69   Resp: 12 14 16    Temp: 36.7 °C (98.1 °F) 36.4 °C (97.6 °F) 36.1 °C (97 °F) 36.5 °C (97.7 °F)   TempSrc: Oral Oral Temporal Temporal   SpO2: 97% 97% 96% 97%   Weight:       Height:        Oxygen Therapy:  Pulse Oximetry: 97 %, O2 (LPM): 0, O2 Delivery Device: None - Room Air    General: no apparent distress  Neck: + JVD   Lungs: normal effort,  without crackles, no wheezing or rhonchi  Heart: normal rate, regular rhythm, 2/6 systolic murmur at the LSB, no rub  EXT: no lower extremity edema  Neurological: No focal deficits, no facial asymmetry.  Normal speech  Psychiatric: Appropriate affect, alert and oriented x 3  Skin: Warm extremities, no rash    Labs (personally reviewed):     Lab Results   Component Value Date/Time    SODIUM 137 08/17/2021 08:27 AM    POTASSIUM 4.2 08/17/2021 08:27 AM    CHLORIDE 101 08/17/2021 08:27 AM    CO2 26 08/17/2021 08:27 AM    GLUCOSE 116 (H) 08/17/2021 08:27 AM    BUN 33 (H) 08/17/2021 08:27 AM    CREATININE 1.45 (H) 08/17/2021 08:27 AM    BUNCREATRAT 18 07/27/2020 10:33 AM     Lab Results   Component Value Date/Time    ALKPHOSPHAT 75 08/17/2021 08:27 AM    ASTSGOT 18 08/17/2021 08:27 AM    ALTSGPT 9 08/17/2021 08:27 AM    TBILIRUBIN 0.7 08/17/2021 08:27 AM      Lab Results   Component Value Date/Time    CHOLSTRLTOT 192 08/16/2021 03:39 AM     (H) 08/16/2021 03:39 AM    HDL 49 08/16/2021 03:39 AM    TRIGLYCERIDE 123 08/16/2021 03:39 AM         Cardiac Imaging and Procedures " Review:      Personal Telemetry Review:  SR 70s.    Echo 8/15/2021:  CONCLUSIONS  Left ventricular systolic function is mildly decreased.  Left ventricular ejection fraction is visually estimated to be 45%.  Global hypokinesis.  Mildly reduced right ventricular systolic function.  Severe right atrial enlargement.  Dilated right ventricle.  Severe right atrial enlargement.  Severely dilated left atrium.  Known transcatheter mitral valve clip repair with appropriate   transvalvular gradients.  Severe mitral regurgitation.  Moderate tricuspid regurgitation.  Right atrial pressure is estimated to be 8 mmHg.  Estimated right ventricular systolic pressure  is 30 mmHg.      EP Micra leadless PPM Implant by Dr. Zamorano 8/3/2020.    [facility] DEVICE FLOWSHEET 2021   Device History: Sick Sinus Syndrome;Atrial Fibrillation   Device Type: Pacemaker;Other (Comment)   Mode: V V I R   Rate: 70   Presenting Rhythm:    Ventricularly Paced: (%) 99.8   Right Ventricular Lead Threshold: (Volts) 0.75   Right Ventricular Lead Sensing: (mV) 9.6   Right Ventricular Lead Impedance: (Ohms) 390   Battery Estimated Longevity: 7-8.7 Years   Changes Made: No changes made       Assessment and Medical Decision Makin   Severe mitral valve regurgitation.  -   S/p Mitraclip  with recurrence  -   TOM by Dr. Mckenna 21 confirmed severe MR  -   Unlikely Mitraclip  -   Recommend medical therapy to stable her, then return to [facility] as outpatient.    2   Moderate AI  -    Stable.    3   Paroxysmal AFIB  -    Remains in SR    4   Chronic anticoagulation with warfarin   -    May resume warfarin from cardiology standpoint.    5   SSS s/p Micra PPM     6   Hx of anemia due to GIB  -    H/H has been stable. hgb 14.5 this AM    7   Cardiomyopathy, EF 45%. (EF had been borderline in the past 50%), likely similar to previous assessments.  -    Will need to have R/LHC at some point.  -    Given her JESSICA and symptom improvement, may be better to wait.    -    No cath planned this admission, unless clinical changes.    Vicki Fischer PA-C  [facility]

## 2021-08-17 NOTE — DIETARY
"Nutrition Services: Day 2 of admit. Lindsay Landrum is a 88 y.o. female with admitting DX of unstable angina.  Consult received for BMI <19, 2-13 lb weight loss over 1 month, decreased appetite.    Pt reports eating \"next to nothing\" which equates to \"10% of baseline PO intake for 1 week\" prior to admission. When asked about her current appetite, pt stated \"I don't have one.\" Pt agreeable to Boost Plus BID. RN reports pt ate ~25% of breakfast this morning.     Assessment:  Ht: 157.5 cm, Wt 8/17: 46.1 kg (101 lb 9.6 oz) via unknown source, BMI: 18.58 - normal  Diet/Intake: cardiac, soft and bite-sized with thin liquids, 2000 ml fluid restriction - Per chart no pt PO intake documented at this time     Evaluation:   1. History of atrial fibrillation, HLD.  2. Weight was 49.8 kg on 7/19/2021 per chart review. 3.7 kg decrease over about 1 month, 7% loss (severe).  3. Labs: glucose 116, BUN 33, creatinine 1.45  4. Meds: pepcid, ferrous gluconate, folvite, lasix, SSI, synthroid, pericolace, aldactone, vitamin D    Malnutrition Risk: Pt with severe malnutrition in the context of acute illness related to poor PO intake as evidenced by consuming 10% of baseline of PO intake for 1 week, severe weight loss, severe muscle loss, severe fat loss.    Recommendations/Plan:  1. Boost Plus BID per poor PO protocol.   2. Encourage intake of meals/supplements.  3. Document intake of all meals/supplements as % taken in ADL's to provide interdisciplinary communication across all shifts.   4. Monitor weight.  5. Nutrition rep will continue to see patient for ongoing meal and snack preferences.    RD following.    "

## 2021-08-18 ENCOUNTER — PATIENT OUTREACH (OUTPATIENT)
Dept: HEALTH INFORMATION MANAGEMENT | Facility: OTHER | Age: 86
End: 2021-08-18

## 2021-08-18 VITALS
SYSTOLIC BLOOD PRESSURE: 132 MMHG | HEART RATE: 70 BPM | OXYGEN SATURATION: 98 % | WEIGHT: 108 LBS | TEMPERATURE: 97.2 F | DIASTOLIC BLOOD PRESSURE: 66 MMHG | BODY MASS INDEX: 19.88 KG/M2 | HEIGHT: 62 IN | RESPIRATION RATE: 16 BRPM

## 2021-08-18 LAB
ALBUMIN SERPL BCP-MCNC: 3.5 G/DL (ref 3.2–4.9)
ALBUMIN/GLOB SERPL: 1.1 G/DL
ALP SERPL-CCNC: 81 U/L (ref 30–99)
ALT SERPL-CCNC: 11 U/L (ref 2–50)
ANION GAP SERPL CALC-SCNC: 16 MMOL/L (ref 7–16)
AST SERPL-CCNC: 21 U/L (ref 12–45)
BASOPHILS # BLD AUTO: 0.7 % (ref 0–1.8)
BASOPHILS # BLD: 0.06 K/UL (ref 0–0.12)
BILIRUB SERPL-MCNC: 0.7 MG/DL (ref 0.1–1.5)
BUN SERPL-MCNC: 29 MG/DL (ref 8–22)
CALCIUM SERPL-MCNC: 8.7 MG/DL (ref 8.5–10.5)
CHLORIDE SERPL-SCNC: 99 MMOL/L (ref 96–112)
CO2 SERPL-SCNC: 23 MMOL/L (ref 20–33)
CREAT SERPL-MCNC: 1.52 MG/DL (ref 0.5–1.4)
EOSINOPHIL # BLD AUTO: 0.06 K/UL (ref 0–0.51)
EOSINOPHIL NFR BLD: 0.7 % (ref 0–6.9)
ERYTHROCYTE [DISTWIDTH] IN BLOOD BY AUTOMATED COUNT: 57 FL (ref 35.9–50)
GLOBULIN SER CALC-MCNC: 3.2 G/DL (ref 1.9–3.5)
GLUCOSE BLD-MCNC: 89 MG/DL (ref 65–99)
GLUCOSE SERPL-MCNC: 94 MG/DL (ref 65–99)
HCT VFR BLD AUTO: 39.3 % (ref 37–47)
HGB BLD-MCNC: 12.9 G/DL (ref 12–16)
IMM GRANULOCYTES # BLD AUTO: 0.03 K/UL (ref 0–0.11)
IMM GRANULOCYTES NFR BLD AUTO: 0.4 % (ref 0–0.9)
LYMPHOCYTES # BLD AUTO: 1.05 K/UL (ref 1–4.8)
LYMPHOCYTES NFR BLD: 12.9 % (ref 22–41)
MAGNESIUM SERPL-MCNC: 2.1 MG/DL (ref 1.5–2.5)
MCH RBC QN AUTO: 31.2 PG (ref 27–33)
MCHC RBC AUTO-ENTMCNC: 32.8 G/DL (ref 33.6–35)
MCV RBC AUTO: 95.2 FL (ref 81.4–97.8)
MONOCYTES # BLD AUTO: 0.72 K/UL (ref 0–0.85)
MONOCYTES NFR BLD AUTO: 8.8 % (ref 0–13.4)
NEUTROPHILS # BLD AUTO: 6.22 K/UL (ref 2–7.15)
NEUTROPHILS NFR BLD: 76.5 % (ref 44–72)
NRBC # BLD AUTO: 0 K/UL
NRBC BLD-RTO: 0 /100 WBC
PHOSPHATE SERPL-MCNC: 2.6 MG/DL (ref 2.5–4.5)
PLATELET # BLD AUTO: 199 K/UL (ref 164–446)
PMV BLD AUTO: 10.3 FL (ref 9–12.9)
POTASSIUM SERPL-SCNC: 3.5 MMOL/L (ref 3.6–5.5)
PROT SERPL-MCNC: 6.7 G/DL (ref 6–8.2)
RBC # BLD AUTO: 4.13 M/UL (ref 4.2–5.4)
SODIUM SERPL-SCNC: 138 MMOL/L (ref 135–145)
WBC # BLD AUTO: 8.1 K/UL (ref 4.8–10.8)

## 2021-08-18 PROCEDURE — 99239 HOSP IP/OBS DSCHRG MGMT >30: CPT | Mod: GC | Performed by: INTERNAL MEDICINE

## 2021-08-18 PROCEDURE — 700102 HCHG RX REV CODE 250 W/ 637 OVERRIDE(OP): Performed by: STUDENT IN AN ORGANIZED HEALTH CARE EDUCATION/TRAINING PROGRAM

## 2021-08-18 PROCEDURE — 84100 ASSAY OF PHOSPHORUS: CPT

## 2021-08-18 PROCEDURE — 700111 HCHG RX REV CODE 636 W/ 250 OVERRIDE (IP): Performed by: INTERNAL MEDICINE

## 2021-08-18 PROCEDURE — 36415 COLL VENOUS BLD VENIPUNCTURE: CPT

## 2021-08-18 PROCEDURE — A9270 NON-COVERED ITEM OR SERVICE: HCPCS | Performed by: INTERNAL MEDICINE

## 2021-08-18 PROCEDURE — 80053 COMPREHEN METABOLIC PANEL: CPT

## 2021-08-18 PROCEDURE — 85025 COMPLETE CBC W/AUTO DIFF WBC: CPT

## 2021-08-18 PROCEDURE — 82962 GLUCOSE BLOOD TEST: CPT | Mod: 91

## 2021-08-18 PROCEDURE — A9270 NON-COVERED ITEM OR SERVICE: HCPCS | Performed by: STUDENT IN AN ORGANIZED HEALTH CARE EDUCATION/TRAINING PROGRAM

## 2021-08-18 PROCEDURE — 99232 SBSQ HOSP IP/OBS MODERATE 35: CPT | Performed by: INTERNAL MEDICINE

## 2021-08-18 PROCEDURE — 83735 ASSAY OF MAGNESIUM: CPT

## 2021-08-18 PROCEDURE — 700102 HCHG RX REV CODE 250 W/ 637 OVERRIDE(OP): Performed by: INTERNAL MEDICINE

## 2021-08-18 RX ORDER — FAMOTIDINE 10 MG
10 TABLET ORAL DAILY
Qty: 60 TABLET | Refills: 0 | Status: SHIPPED
Start: 2021-08-18 | End: 2021-10-20

## 2021-08-18 RX ORDER — WARFARIN SODIUM 4 MG/1
4 TABLET ORAL DAILY
Status: DISCONTINUED | OUTPATIENT
Start: 2021-08-18 | End: 2021-08-18 | Stop reason: HOSPADM

## 2021-08-18 RX ORDER — POTASSIUM CHLORIDE 20 MEQ/1
20 TABLET, EXTENDED RELEASE ORAL 2 TIMES DAILY
Status: COMPLETED | OUTPATIENT
Start: 2021-08-18 | End: 2021-08-18

## 2021-08-18 RX ORDER — DILTIAZEM HYDROCHLORIDE 180 MG/1
180 CAPSULE, COATED, EXTENDED RELEASE ORAL EVERY MORNING
Qty: 30 CAPSULE | Refills: 0 | Status: SHIPPED | OUTPATIENT
Start: 2021-08-19 | End: 2021-09-18

## 2021-08-18 RX ORDER — POTASSIUM CHLORIDE 7.45 MG/ML
10 INJECTION INTRAVENOUS
Status: DISCONTINUED | OUTPATIENT
Start: 2021-08-18 | End: 2021-08-18

## 2021-08-18 RX ORDER — POTASSIUM CHLORIDE 20 MEQ/1
20 TABLET, EXTENDED RELEASE ORAL 2 TIMES DAILY
Qty: 60 TABLET | Refills: 0 | Status: SHIPPED | OUTPATIENT
Start: 2021-08-18 | End: 2021-09-17

## 2021-08-18 RX ORDER — WARFARIN SODIUM 4 MG/1
4 TABLET ORAL DAILY
Status: DISCONTINUED | OUTPATIENT
Start: 2021-08-18 | End: 2021-08-18

## 2021-08-18 RX ADMIN — DILTIAZEM HYDROCHLORIDE 180 MG: 180 CAPSULE, COATED, EXTENDED RELEASE ORAL at 05:13

## 2021-08-18 RX ADMIN — FOLIC ACID 1 MG: 1 TABLET ORAL at 05:13

## 2021-08-18 RX ADMIN — DOCUSATE SODIUM 50 MG AND SENNOSIDES 8.6 MG 2 TABLET: 8.6; 5 TABLET, FILM COATED ORAL at 05:13

## 2021-08-18 RX ADMIN — POTASSIUM CHLORIDE 20 MEQ: 20 TABLET, EXTENDED RELEASE ORAL at 10:59

## 2021-08-18 RX ADMIN — WARFARIN SODIUM 4 MG: 4 TABLET ORAL at 18:07

## 2021-08-18 RX ADMIN — LEVOTHYROXINE SODIUM 75 MCG: 0.07 TABLET ORAL at 05:13

## 2021-08-18 RX ADMIN — DOCUSATE SODIUM 50 MG AND SENNOSIDES 8.6 MG 2 TABLET: 8.6; 5 TABLET, FILM COATED ORAL at 18:07

## 2021-08-18 RX ADMIN — POTASSIUM CHLORIDE 10 MEQ: 7.46 INJECTION, SOLUTION INTRAVENOUS at 09:32

## 2021-08-18 RX ADMIN — FUROSEMIDE 40 MG: 40 TABLET ORAL at 05:13

## 2021-08-18 RX ADMIN — FAMOTIDINE 10 MG: 20 TABLET ORAL at 06:00

## 2021-08-18 RX ADMIN — POTASSIUM CHLORIDE 20 MEQ: 20 TABLET, EXTENDED RELEASE ORAL at 18:07

## 2021-08-18 RX ADMIN — Medication 1000 UNITS: at 05:13

## 2021-08-18 ASSESSMENT — PAIN DESCRIPTION - PAIN TYPE: TYPE: ACUTE PAIN

## 2021-08-18 ASSESSMENT — CHA2DS2 SCORE
PRIOR STROKE OR TIA OR THROMBOEMBOLISM: NO
AGE 65 TO 74: NO
HYPERTENSION: NO
AGE 75 OR GREATER: YES
DIABETES: NO
CHA2DS2 VASC SCORE: 4
VASCULAR DISEASE: YES
SEX: FEMALE
CHF OR LEFT VENTRICULAR DYSFUNCTION: NO

## 2021-08-18 ASSESSMENT — FIBROSIS 4 INDEX: FIB4 SCORE: 2.8

## 2021-08-18 NOTE — CARE PLAN
Problem: Respiratory  Goal: Patient will achieve/maintain optimum respiratory ventilation and gas exchange  Outcome: Progressing     Problem: Nutrition  Goal: Patient's nutritional and fluid intake will be adequate or improve  Outcome: Progressing     Problem: Mobility  Goal: Patient's capacity to carry out activities will improve  Outcome: Progressing   The patient is Watcher - Medium risk of patient condition declining or worsening    Shift Goals  Clinical Goals: Barium swallow study  Patient Goals: rest. eat  Family Goals: na    Progress made toward(s) clinical / shift goals:  Pt had swallow study today which found no stricture or other abnormalities, diet was ordered and patient ate all of the meal provided, pt also able to ambulate well w/ minimal assistance from staff    Patient is not progressing towards the following goals: N/A

## 2021-08-18 NOTE — THERAPY
"Physical Therapy   Initial Evaluation     Patient Name: Lindsay Landrum  Age:  88 y.o., Sex:  female  Medical Record #: [id number]  Today's Date: 8/17/2021     Precautions: Fall Risk, Swallow Precautions ( See Comments)    Assessment  Patient is 88 y.o. female that presented to acute with complaints of SOB and dyspnea at rest. PMHx significant for Afib on Warfarin, PPM, severe MR s/p mitral clip with recurrent severe MR and moderate AR, chronic diastolic HF, hypothyroidism, HLD. She presented to PT with decreased activity tolerance however she was able to perform bed mobility, transfers, and in room ambulation at supervision level. She appears to be primarily limited by medical co morbidities. Recommend WC for community distances given limited tolerance and to prevent risk of readmission. Patient will not be actively followed for physical therapy services at this time, however may be seen if requested by physician for 1 more visit within 30 days to address any discharge or equipment needs.      Plan    Recommend Physical Therapy for Evaluation only    DC Equipment Recommendations: Wheelchair  Discharge Recommendations: Recommend home health for continued physical therapy services       Subjective    \"I have to take breaks all the time.\" (patient)  \"She has been forced to stay home because the even walking from the car to the front door is too much\" (daughter)     Objective       08/17/21 1731   Total Time Spent   Total Time Spent (Mins) 34   Charge Group   PT Evaluation PT Evaluation Low   Initial Contact Note    Initial Contact Note Order Received and Verified, Evaluation Only - Patient Does Not Require Further Acute Physical Therapy at this Time.  However, May Benefit from Post Acute Therapy for Higher Level Functional Deficits.   Precautions   Precautions Fall Risk;Swallow Precautions ( See Comments)  (severe MR)   Vitals   O2 (LPM) 0   O2 Delivery Device None - Room Air   Pain 0 - 10 Group   Therapist Pain " Assessment   (no pain complaint)   Prior Living Situation   Prior Services None   Housing / Facility 1 Story House   Steps Into Home 1   Steps In Home 0   Lives with - Patient's Self Care Capacity Spouse   Comments Patient lives in [facility], and [place] with spouse. Daughter at bedside reported ample social support in [place]   Prior Level of Functional Mobility   Bed Mobility Independent   Transfer Status Independent   Ambulation Independent   Distance Ambulation (Feet)   (community)   Assistive Devices Used None   Stairs Independent   Cognition    Cognition / Consciousness X   Speech/ Communication Hard of Hearing   Comments pleasant, cooperative   Passive ROM Lower Body   Passive ROM Lower Body WDL   Comments not formally tested, WFL for mobiltiy   Active ROM Lower Body    Active ROM Lower Body  WDL   Comments as above   Strength Lower Body   Lower Body Strength  WDL   Comments as above. Patient reported weakness as compared to baseline   Sensation Lower Body   Lower Extremity Sensation   Not Tested   Lower Body Muscle Tone   Lower Body Muscle Tone  WDL   Neurological Concerns   Neurological Concerns No   Coordination Lower Body    Coordination Lower Body  WDL   Balance Assessment   Sitting Balance (Static) Fair +   Sitting Balance (Dynamic) Fair +   Standing Balance (Static) Fair   Standing Balance (Dynamic) Fair   Weight Shift Sitting Fair   Weight Shift Standing Fair   Comments no AD, no LOB   Gait Analysis   Gait Level Of Assist Supervised   Assistive Device None   Distance (Feet) 20   # of Times Distance was Traveled 2   Deviation   (decreased esther, step length. cautious gait)   # of Stairs Climbed 0   Weight Bearing Status no restrictions   Vision Deficits Impacting Mobility NT   Comments limited by fatigue   Bed Mobility    Supine to Sit Supervised   Sit to Supine Supervised   Scooting Supervised   Comments HOB elevated, appears capable   Functional Mobility   Sit to Stand Supervised   Bed,  Chair, Wheelchair Transfer Supervised   Toilet Transfers Supervised   Transfer Method Stand Step   How much difficulty does the patient currently have...   Turning over in bed (including adjusting bedclothes, sheets and blankets)? 4   Sitting down on and standing up from a chair with arms (e.g., wheelchair, bedside commode, etc.) 4   Moving from lying on back to sitting on the side of the bed? 4   How much help from another person does the patient currently need...   Moving to and from a bed to a chair (including a wheelchair)? 4   Need to walk in a hospital room? 3   Climbing 3-5 steps with a railing? 3   6 clicks Mobility Score 22   Activity Tolerance   Sitting in Chair declined   Sitting Edge of Bed 10 min   Standing 5 min   Comments limited by fatigue   Edema / Skin Assessment   Edema / Skin  Not Assessed   Education Group   Education Provided Role of Physical Therapist   Role of Physical Therapist Patient Response Patient;Acceptance;Explanation;Verbal Demonstration   Anticipated Discharge Equipment and Recommendations   DC Equipment Recommendations Wheelchair   Discharge Recommendations Recommend home health for continued physical therapy services   Interdisciplinary Plan of Care Collaboration   IDT Collaboration with  Nursing;Occupational Therapist   Patient Position at End of Therapy In Bed;Call Light within Reach;Tray Table within Reach;Phone within Reach;Family / Friend in Room   Collaboration Comments RN aware of visit, response, DC recs   Session Information   Date / Session Number  8/17 - DC needs only   Priority 0

## 2021-08-18 NOTE — PROGRESS NOTES
Inpatient Anticoagulation Service Note    Date: 8/18/2021    Reason for Anticoagulation: Atrial Fibrillation      [id number] VASc Score: 4  HAS-BLED Score: 2   Hemoglobin Value: 12.9  Hematocrit Value: 39.3  Lab Platelet Value: 199    INR from last 7 days     Date/Time INR Value    08/16/21 03:39:01 3.15        Dose from last 7 days     Date/Time Dose (mg)    08/18/21 1506 4        Average Dose (mg):  (Home dose: warfarin 4mg po daily)  Significant Interactions: Thyroid Medications  Bridge Therapy: No  Reversal Agent Administered: Not Applicable    Comments: Patient continues home warfarin for history of AF. INR on admission slightly supra-therapeutic. H/H stable and no signs of active bleeding. DDI noted above. Soft diet ordered, minimal intake per chart review. Last warfarin dose on 8/15, per med rec. Last INR on 8/16 was slightly supra-therapeutic, though likely has down-trended due to missed doses on 8/16 and 8/17; patient with possible discharge today, will continue home warfarin 4mg po daily with repeat INR tomorrow.    Plan:  Warfarin 4mg po daily   Education Material Provided?: No (Home medication)  Pharmacist suggested discharge dosing: tentative plan to continue warfarin 4mg po daily with repeat INR within 2-3 days of discharge.      Courtney Estrada, PharmD, BCCCP

## 2021-08-18 NOTE — PALLIATIVE CARE
Pt has a completed POLST DNR/ selective treatment and no artificial nutrition or feeding tubes and it must be sent with the patient at time of discharge. Please call [phone] if it is left behind.

## 2021-08-18 NOTE — DISCHARGE PLANNING
Received Choice form at [id number]  Agency/Facility Name: [facility]  Referral sent per Choice form @ [phone]

## 2021-08-18 NOTE — CONSULTS
Reason for PC Consult: Advance Care Planning    Consulted by: Dr. Delgado    Assessment:  General: Pt is a 89 yo female that came into [facility] for SOB stated that she moved here from [place] and over the past month she has gotten more SOB. She has a hx of A fib, pacemaker, severe MR s/p mitraclip with recurrent severe MR and moderate AR. She had a previous GI bleed as well with Nausea. She is interested in aggressive management of her valvular heart disease considering open heart surgery.     Social: Pt was has a S.O Rich Aboumead that is 92 and she helps take care of him, he recently got dx with Lukemia. Pt came to [place] but wants to go to [place] where she has a condo and can be closer to her children. She has 3 daughters and one son. Aurora Agustin [phone] is her daughter that is here in [place] and will be helping her get back to [place].     Consults: Cardiology    Dyspnea: Yes- shortness of breath with exertion  Last BM:  (PTA)-    Pain:  -  No c/o Nausea  Depression: No-    Dementia: No;       Spiritual:  Is Buddhist or spirituality important for coping with this illness? No-    Has a  or spiritual provider visit been requested? No    Palliative Performance Scale: 50%    Advance Directive: None-    DPOA:  -  NOK Daughter Aurora Agustin [phone]   POLST: Yes-      Code Status: DNR-      Outcome:  Met pt and daughter at bedside.  Introduced self and role of Palliative care. Assessed pt's understanding of pt's current medical status, overall sue picture, and options for future care. Lindsay had a good understanding of her current medical condition and talked about her issues with her heart and also the esophageal dysphagia. She understands that she may be high risk for surgery and no a good candidate but would like to get a second opinion when she goes back to [place]. She spoke with Dr. Delgado and his team at length about her quality of life and how [place] was [place].  "    Explored pt's values, preferences and options for future care. She asked about home health to help with on going PT/OT. She has a PC MD in the [place] area that she will follow up with. She stated that her S.O is older and depends a lot on her for tasks and she is not sure if she will be able to continue to wait on him as she does now. PC RN provided information about private care givers who can help with light house keeping and meal prep. Lindsay also wanted to know about information about hospice due to her husbands new Dx. PC RN talked about hospice and the benefits. We discussed her chronic issues with her cardiac status and also addressed burdens vs benefit. Lindsay stated, \"right now this does not feel like quality of life.\" Lindsay described her self and independent and likes to be able to keep active, she eats healthy and enjoys food but it has been harder and harder to eat due to her esophageal issues. PC RN reviewed recommendations for nutrition and foods that have good nutritional impact that can be made with a [employer] .  Also to conserve energy recommendation made for food delivery to her condo. She asked if it would be beneficial to go to a skilled facility to get rehab. RN addressed HH vs SNF.  Lindsay was also curious about a portable oxygen tank that is light weight. Offered them information on the [employer] o2 tanks that could travel easier. Lindsay is still exploring her options and would like to get settled back into her condo in [place], hoping she feels a bit better out of the heat smoke and altitude.     Lindsay could not remember completing a AD for medical POA she believes she had and her daughter in [place] has a copy but would like to review it. Gave a copy of the AD and reviewed how to go complete it. PC RN clarified that Lindsay wants to be a DNR/DNI and she stated \"yes\" With her DNR/DNI code status RN helped Lindsay complete a POLST form and education provided. Lindsay and Aurora verbalized " their understanding.      Active listening, reflection, reminiscing, validation and normalization and empathic support provided throughout the encounter. All questions answered and PC contact information provided.     Recommendations: I do not recommend an ethics or hospice consult at this time because pt is seeking aggressive treatment for her cardiac disease. .    Updated: RN and LCSW and MD    Plan: Continue to provide support. POLST signed and scanned into pt's chart and placed at her head of bed to go home with her at time of d/c     Thank you for allowing Palliative Care to participate in this patient's care. Please feel free to call [phone] with any questions or concerns.

## 2021-08-18 NOTE — PROGRESS NOTES
Received bedside report from RN, pt care assumed, RAYS. Pt AAOx4, states she does not feel very good today. States she is discouraged that she gets so short of breath when walking. No signs of acute distress noted at this time. Plan of care discussed with pt and verbalizes no questions. Pt denies any additional needs at this time. Bed locked/in lowest position, pt educated on fall risk and verbalized understanding, call light within reach, hourly rounding initiated.

## 2021-08-18 NOTE — PROGRESS NOTES
[facility] Cardiology Follow-up Note    Date of Service:    8/18/2021      Name:   Lindsay Landrum   YOB: 1932  Age:   88 y.o.  female   MRN:   [id number]      Chief Complaint: dyspnea on exertion    Primary Cardiologist:  Dr. Capps    HPI:  Ms. Landrum is an 89 y/o lady with PMH including paroxysmal atrial fibrillation on warfarin, SSS s/p Micra PPM 2020, severe MR s/p Mitraclip [facility] 2018 now with recurrent MR, moderate AI, Dyslipidemia, Essential hypertension, hx of GIB who presented to [facility] on 8/15/2021 with shortness of breath and worsening dyspnea with exertion.       She was evaluated at [facility] recently and is planning on having open valve replacement.  She had plans for out patient TOM, R/LHC.    Interim Events:  Patient states she just cannot do much  Gets tired and out of breath  No appetite, still c/o trouble swallowing.   Dyspneic with any exertion    ROS  Constitutional:  denies fatigue.  Respiratory:  + BRADFORD, no cough.  Cardiovascular:  Denies chest pain.  no lower extremity edema.  Denies orthopnea or PND.  : + polyuria, no dysuria.  GI:  Denies nausea/vomiting.  No abdominal distention. + anorexia.  Neuro:  Denies dizziness, syncope.  Hem/lymph: Denies easy bleeding/bruising.      All other review of systems reviewed and negative.    Past medical, surgical, social, and family history reviewed and unchanged from admission except as noted in HPI.    Medications: Reviewed in MAR  Current Facility-Administered Medications   Medication Dose Frequency Provider Last Rate Last Admin   • potassium chloride SA (Kdur) tablet 20 mEq  20 mEq BID Penny Ojeda M.D.       • DILTIAZem CD (CARDIZEM CD) capsule 180 mg  180 mg QAM Radha Sy M.D.   180 mg at 08/18/21 0513   • furosemide (LASIX) tablet 40 mg  40 mg DAILY Radha Sy M.D.   40 mg at 08/18/21 0513   • insulin lispro (AdmeLOG) injection  1-6 Units 4X/DAY BETO Bellamy  RADHA Sy        And   • glucose 4 g chewable tablet 16 g  16 g Q15 MIN PRN Radha Sy M.D.        And   • dextrose 50% (D50W) injection 50 mL  50 mL Q15 MIN PRN Radha Sy M.D.       • senna-docusate (PERICOLACE or SENOKOT S) 8.6-50 MG per tablet 2 Tablet  2 Tablet BID Jordan Franco M.D.   2 Tablet at 08/18/21 0513    And   • polyethylene glycol/lytes (MIRALAX) PACKET 1 Packet  1 Packet QDAY PRN Jordan Franco M.D.        And   • magnesium hydroxide (MILK OF MAGNESIA) suspension 30 mL  30 mL QDAY PRN Jordan Franco M.D.        And   • bisacodyl (DULCOLAX) suppository 10 mg  10 mg QDAY PRN Jordan Franco M.D.       • enalaprilat (VASOTEC) injection 1.25 mg  1.25 mg Q6HRS PRN Jordan Franco M.D.       • labetalol (NORMODYNE/TRANDATE) injection 10 mg  10 mg Q4HRS PRN Jordan Franco M.D.       • acetaminophen (Tylenol) tablet 650 mg  650 mg Q6HRS PRN Jordan Franco M.D.       • nitroglycerin (NITROSTAT) tablet 0.4 mg  0.4 mg Q5 MIN PRN Jordan Franco M.D.       • morphine (pf) 4 mg/mL injection 2-4 mg  2-4 mg Q5 MIN PRN Jordan Franco M.D.       • ondansetron (ZOFRAN) syringe/vial injection 4 mg  4 mg Q4HRS PRN Jordan Franco M.D.       • ondansetron (ZOFRAN ODT) dispertab 4 mg  4 mg Q4HRS PRN Jordan Franco M.D.       • guaiFENesin dextromethorphan (ROBITUSSIN DM) 100-10 MG/5ML syrup 10 mL  10 mL Q6HRS PRN Jordan Franco M.D.       • famotidine (PEPCID) tablet 10 mg  10 mg DAILY Jordan Franco M.D.   10 mg at 08/18/21 0600   • folic acid (FOLVITE) tablet 1 mg  1 mg DAILY Jordan Franco M.D.   1 mg at 08/18/21 0513   • levothyroxine (SYNTHROID) tablet 75 mcg  75 mcg QA Jordan Franco M.D.   75 mcg at 08/18/21 0513   • [Held by provider] spironolactone (ALDACTONE) tablet 25 mg  25 mg DAILY Jordan Franco M.D.       • ferrous gluconate (FERGON) tablet 324 mg  324 mg QDAY with Breakfast Jordan Franco M.D.   324 mg at 08/17/21 0857   • vitamin D (cholecalciferol) tablet 1,000 Units  1,000 Units DAILY Jordan  "RADHA Franco   1,000 Units at 08/18/21 0513   Last reviewed on 8/15/2021  8:38 PM by Naima Fernandez, Pharmacy Intern    No Known Allergies    Physical Exam  Body mass index is 19.75 kg/m². BP (!) 99/51   Pulse 72   Temp 36 °C (96.8 °F) (Temporal)   Resp 15   Ht 1.575 m (5' 2\")   Wt 49 kg (108 lb)   SpO2 95%    Vitals:    08/18/21 0000 08/18/21 0317 08/18/21 0500 08/18/21 0759   BP: 106/59 (!) 94/56 103/59 (!) 99/51   Pulse: 70 73 71 72   Resp: 18 16  15   Temp: 36.6 °C (97.8 °F) 36.3 °C (97.3 °F)  36 °C (96.8 °F)   TempSrc: Oral Temporal  Temporal   SpO2: 95%   95%   Weight:    49 kg (108 lb)   Height:        Oxygen Therapy:  Pulse Oximetry: 95 %, O2 (LPM): 0, O2 Delivery Device: None - Room Air    General: no apparent distress  Neck: no JVD   Lungs: normal effort,  without crackles, no wheezing or rhonchi  Heart: normal rate, regular rhythm, 2/6 systolic murmur at the apex, no rub  EXT: no lower extremity edema  Neurological: No focal deficits, no facial asymmetry.  Normal speech  Psychiatric: Appropriate affect, alert and oriented x 3  Skin: Warm extremities, no rash    Labs (personally reviewed):     Lab Results   Component Value Date/Time    SODIUM 138 08/18/2021 02:49 AM    POTASSIUM 3.5 (L) 08/18/2021 02:49 AM    CHLORIDE 99 08/18/2021 02:49 AM    CO2 23 08/18/2021 02:49 AM    GLUCOSE 94 08/18/2021 02:49 AM    BUN 29 (H) 08/18/2021 02:49 AM    CREATININE 1.52 (H) 08/18/2021 02:49 AM    BUNCREATRAT 18 07/27/2020 10:33 AM     Lab Results   Component Value Date/Time    ALKPHOSPHAT 81 08/18/2021 02:49 AM    ASTSGOT 21 08/18/2021 02:49 AM    ALTSGPT 11 08/18/2021 02:49 AM    TBILIRUBIN 0.7 08/18/2021 02:49 AM      Lab Results   Component Value Date/Time    CHOLSTRLTOT 192 08/16/2021 03:39 AM     (H) 08/16/2021 03:39 AM    HDL 49 08/16/2021 03:39 AM    TRIGLYCERIDE 123 08/16/2021 03:39 AM         Cardiac Imaging and Procedures Review:      Personal Telemetry Review:  SR 70s.    Echo " 8/15/2021:  CONCLUSIONS  Left ventricular systolic function is mildly decreased.  Left ventricular ejection fraction is visually estimated to be 45%.  Global hypokinesis.  Mildly reduced right ventricular systolic function.  Severe right atrial enlargement.  Dilated right ventricle.  Severe right atrial enlargement.  Severely dilated left atrium.  Known transcatheter mitral valve clip repair with appropriate   transvalvular gradients.  Severe mitral regurgitation.  Moderate tricuspid regurgitation.  Right atrial pressure is estimated to be 8 mmHg.  Estimated right ventricular systolic pressure  is 30 mmHg.      EP Micra leadless PPM Implant by Dr. Zamorano 8/3/2020.    [facility] DEVICE FLOWSHEET 2021   Device History: Sick Sinus Syndrome;Atrial Fibrillation   Device Type: Pacemaker;Other (Comment)   Mode: V V I R   Rate: 70   Presenting Rhythm:    Ventricularly Paced: (%) 99.8   Right Ventricular Lead Threshold: (Volts) 0.75   Right Ventricular Lead Sensing: (mV) 9.6   Right Ventricular Lead Impedance: (Ohms) 390   Battery Estimated Longevity: 7-8.7 Years   Changes Made: No changes made       Assessment and Medical Decision Makin   Severe mitral valve regurgitation.  -   S/p Mitraclip  with recurrence  -   TOM by Dr. Mckenna 21 confirmed severe MR  -   Not a mitraclip candidate here.   -   She has a place in [place] and is contemplating going to [facility] there for treatment.  -    Does not appear volume overloaded at this time, if anything a bit dry as she has not been eating and drinking well.  Her IV infiltrated this morning, IVF unable to be given at this time.    2   Moderate AI  -    Stable.    3   Paroxysmal AFIB  -    Remains in SR    4   Chronic anticoagulation with warfarin   -    May resume warfarin from cardiology standpoint.    5   SSS s/p Micra PPM     6   Hx of anemia due to GIB  -    H/H has been stable. hgb 14.5 this AM    7   Cardiomyopathy, EF 45%. (EF had been  borderline in the past 50%), likely similar to previous assessments.  -    Will need to have R/LHC at some point.  -    Given her JESSICA and symptom improvement, may be better to wait.   -    No cath planned this admission, unless clinical changes.    Cardiology will sign off at this time.  Please contact our service directly with further questions/concerns.      Vicki Fischer PA-C  [facility]

## 2021-08-18 NOTE — PROGRESS NOTES
Patient's IV infiltrated, anticipated discharge today, requested PO potassium replacement from Dr. Ojeda and Dr. Sy.

## 2021-08-18 NOTE — DISCHARGE PLANNING
Anticipated Discharge Disposition: home with dme oxygen and wheelchair     Action: met with pt at bedside to coordinate services needed for pt's discharge. Pt reports that she is planning on driving up to [place] with her  who is 89 and her daughter for part of the way. Pt states that she does not feel she needs oxygen or a wheelchair and does not want these things due to their bulky nature. Her daughter and stressed importance of needing a wheelchair and oxygen for discharge to ensure pt is able to get up to her home in [place].     Pt signed choice form for [facility].     Faxed to [facility] at [phone]    Pt requested smaller wheelchair. She would like 16 inch wheelchair. Notified MD to update order to reflect 16 inch wheelchair.     Talked with them about recommendation for home health, and let her know that if she is going up to [place] on Friday, we would not set up home health for her.     Let her know that she would need to set this up herself with PCP in [place], and stressed need for appt for her PCP who she reports that she has not seen in 2 years.     Barriers to Discharge: dme oxygen and wheelchair set up     Plan: LSW to assist as needed and monitor for barriers to discharge.

## 2021-08-18 NOTE — THERAPY
"Occupational Therapy   Initial Evaluation     Patient Name: Lindsay Landrum  Age:  88 y.o., Sex:  female  Medical Record #: [id number]  Today's Date: 8/17/2021       Precautions: Fall Risk, Swallow Precautions ( See Comments)    Assessment    Patient is 88 y.o. female with h/o a-fib, pacemaker, HLD, heart valve dz, who presented with SOB. Pt underwent TOM which showed cardiomyopathy. Planning to eventually complete cardiac cath, but delayed due to JESSICA. Plan to complete on OP basis. Pt seen for OT eval. Daughter from [place] present and supportive. Reports she can stay with pt initially but not indefinitely. Pt was able to complete BADL and transfers with no more than supv, but c/o fatigue throughout. Requested in-bed exercise options. Provided Theraband as pt reports she has used in past. Educated pt and dtr on recommendation for shower chair use for energy conservation. Pt would benefit from manual WC for community distances and 4WW for in-home. Daughter agrees to purchase 4WW. Encouraged pt to consider return to [place] [facility] which is at [place], is currently smoke-free and where she has good family support. Daughter strongly agrees. Also provided written resource on energy conservation strategies. Pt has no further acute OT needs at this time, but would benefit from HH OT if able to arrange.     Plan    Recommend Occupational Therapy for Evaluation only     DC Equipment Recommendations: 16\" wide manual Wheelchair with standard footrests, Tub / Shower Seat (pt agrees to purchase shower chair)  Discharge Recommendations: Recommend home health for continued occupational therapy services     Subjective    \"I'm thinking of getting a wheelchair      Objective       08/17/21 6380   Prior Living Situation   Prior Services None;Home-Independent   Housing / Facility 1 Story House   Steps Into Home 0   Bathroom Set up Walk In Shower;Shower Chair  ( has shower chair )   Equipment Owned Tub / Shower Seat   Lives with - " Patient's Self Care Capacity Spouse   Comments Pt lives between 3 homes: [facility] & [place]. Reports no support here, but good family support in [place].    Prior Level of ADL Function    Pt was independent with BADL PTA   Prior Level of IADL Function    Pt was independent with I-ADL and functional mobility PTA   Comments Reports recent decline in activity tolerance negatively impacting community mobility    Balance Assessment   Sitting Balance (Static) Fair +   Sitting Balance (Dynamic) Fair   Standing Balance (Static) Fair   Standing Balance (Dynamic) Fair   Weight Shift Sitting Fair   Weight Shift Standing Fair   Comments no AD, no LOB   Bed Mobility    Supine to Sit Supervised   Sit to Supine Supervised   Scooting Supervised   Comments HOB elevated   ADL Assessment   Grooming Supervision;Standing  (hand hygiene; declined oral care)   Lower Body Dressing Supervision  (doff/don B socks )   Toileting Supervision  (urination on toilet)   Functional Mobility   Sit to Stand Supervised   Bed, Chair, Wheelchair Transfer Supervised   Toilet Transfers Supervised   Transfer Method Stand Step  (no AD)   Mobility Supine > < EOB, short gait and transfers in room    Activity Tolerance   Sitting in Chair declined due to fatigue   Sitting Edge of Bed 10 min    Standing 4 min    Comments c/o fatigue with OOB activity

## 2021-08-18 NOTE — PROGRESS NOTES
Monitor Summary     Rhythm AFlutt-Paced  HR Range 68-69  Ectopy Rare PVC  Measurements N/A                  yes

## 2021-08-18 NOTE — FACE TO FACE
"Face to Face Note  -  Durable Medical Equipment    Radha Sy M.D. - NPI: [id number]  I certify that this patient is under my care and that they had a durable medical equipment(DME)face to face encounter by myself that meets the physician DME face-to-face encounter requirements with this patient on:    Date of encounter:   Patient:                    MRN:                       YOB: 2021  Lindsay Landrum  [id number]  11/13/1932     The encounter with the patient was in whole, or in part, for the following medical condition, which is the primary reason for durable medical equipment:  Other - Dyspnea    I certify that, based on my findings, the following durable medical equipment is medically necessary:  Oxygen.    HOME O2 Saturation Measurements:(Values must be present for Home Oxygen orders)  Room air sat at rest: 96  Room air sat with amb: 87  With liters of O2: 0.5, O2 sat at rest with O2: 97  With Liters of O2: 3, O2 sat with amb with O2 : 91  Is the patient mobile?: Yes    My Clinical findings support the need for the above equipment due to:  Hypoxia    Supporting Symptoms: The patient requires supplemental oxygen, as the following interventions have been tried with limited or no improvement: \"Ambulation with oximetry    If patient feels more short of breath, they can go up to 6 liters per minute and contact healthcare provider.  "

## 2021-08-19 ENCOUNTER — DOCUMENTATION (OUTPATIENT)
Dept: VASCULAR LAB | Facility: MEDICAL CENTER | Age: 86
End: 2021-08-19

## 2021-08-19 LAB — GLUCOSE BLD-MCNC: 107 MG/DL (ref 65–99)

## 2021-08-19 NOTE — DISCHARGE INSTRUCTIONS
Dear Ms. Landrum    Thanks for giving us the opportunity to take care of you during your stay here at [facility].    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  During your stay, we optimized your medications to reduce the strain on your heart.  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  Due to your kidney function not been totally optimal, the cardiologist thought that it would be best not to go for any further procedure while here.    As discussed with you, it would be better that you get assessed at [facility] to evaluate whether or not to be a good candidate for surgery.  In the meantime, we will discharge you on medication to optimize your heart function.    During your stay here, you were also complaining of difficulty swallowing food.  You underwent a speech swallow evaluation that was unremarkable and the barium swallow test that was also done was also unremarkable.  Those do not rule out that there could still be some abnormality in your esophagus.  We highly recommend that you follow-up with your primary care doctor so he can probably refer you to a gastroenterologist.    At the time of this discharge, you are also going home with a wheelchair and oxygen.  Initially it was suggested that you would benefit from home health physical therapy, but given that you [place], you will need to talk to your primary care physician to set that up for you.    Again, it was a pleasure taking care of you here at [facility]  We wish you the best    Your health care team        Discharge Instructions    Discharged to home by car with relative. Discharged via wheelchair, hospital escort: Yes.  Special equipment needed: Oxygen, Walker and Wheelchair    Be sure to schedule a follow-up appointment with your primary care doctor or any specialists as  instructed.     Discharge Plan:   Diet Plan: Discussed  Activity Level: Discussed  Confirmed Follow up Appointment: Appointment Scheduled  Confirmed Symptoms Management: Discussed  Medication Reconciliation Updated: Yes    I understand that a diet low in cholesterol, fat, and sodium is recommended for good health. Unless I have been given specific instructions below for another diet, I accept this instruction as my diet prescription.     Special Instructions: None    · Is patient discharged on Warfarin / Coumadin?   Yes    You are receiving the drug warfarin. Please understand the importance of monitoring warfarin with scheduled PT/INR blood draws.  Follow-up with a call to your personal Doctor's office in 3 days to schedule a PT/INR. .    IMPORTANT: HOW TO USE THIS INFORMATION:  This is a summary and does NOT have all possible information about this product. This information does not assure that this product is safe, effective, or appropriate for you. This information is not individual medical advice and does not substitute for the advice of your health care professional. Always ask your health care professional for complete information about this product and your specific health needs.      WARFARIN - ORAL (WARF-uh-rin)      COMMON BRAND NAME(S): Coumadin      WARNING:  Warfarin can cause very serious (possibly fatal) bleeding. This is more likely to occur when you first start taking this medication or if you take too much warfarin. To decrease your risk for bleeding, your doctor or other health care provider will monitor you closely and check your lab results (INR test) to make sure you are not taking too much warfarin. Keep all medical and laboratory appointments. Tell your doctor right away if you notice any signs of serious bleeding. See also Side Effects section.      USES:  This medication is used to treat blood clots (such as in deep vein thrombosis-DVT or pulmonary embolus-PE) and/or to prevent new clots  "from forming in your body. Preventing harmful blood clots helps to reduce the risk of a stroke or heart attack. Conditions that increase your risk of developing blood clots include a certain type of irregular heart rhythm (atrial fibrillation), heart valve replacement, recent heart attack, and certain surgeries (such as hip/knee replacement). Warfarin is commonly called a \"blood thinner,\" but the more correct term is \"anticoagulant.\" It helps to keep blood flowing smoothly in your body by decreasing the amount of certain substances (clotting proteins) in your blood.      HOW TO USE:  Read the Medication Guide provided by your pharmacist before you start taking warfarin and each time you get a refill. If you have any questions, ask your doctor or pharmacist. Take this medication by mouth with or without food as directed by your doctor or other health care professional, usually once a day. It is very important to take it exactly as directed. Do not increase the dose, take it more frequently, or stop using it unless directed by your doctor. Dosage is based on your medical condition, laboratory tests (such as INR), and response to treatment. Your doctor or other health care provider will monitor you closely while you are taking this medication to determine the right dose for you. Use this medication regularly to get the most benefit from it. To help you remember, take it at the same time each day. It is important to eat a balanced, consistent diet while taking warfarin. Some foods can affect how warfarin works in your body and may affect your treatment and dose. Avoid sudden large increases or decreases in your intake of foods high in vitamin K (such as broccoli, cauliflower, cabbage, brussels sprouts, kale, spinach, and other green leafy vegetables, liver, green tea, certain vitamin supplements). If you are trying to lose weight, check with your doctor before you try to go on a diet. Cranberry products may also affect " how your warfarin works. Limit the amount of cranberry juice (16 ounces/480 milliliters a day) or other cranberry products you may drink or eat.      SIDE EFFECTS:  Nausea, loss of appetite, or stomach/abdominal pain may occur. If any of these effects persist or worsen, tell your doctor or pharmacist promptly. Remember that your doctor has prescribed this medication because he or she has judged that the benefit to you is greater than the risk of side effects. Many people using this medication do not have serious side effects. This medication can cause serious bleeding if it affects your blood clotting proteins too much (shown by unusually high INR lab results). Even if your doctor stops your medication, this risk of bleeding can continue for up to a week. Tell your doctor right away if you have any signs of serious bleeding, including: unusual pain/swelling/discomfort, unusual/easy bruising, prolonged bleeding from cuts or gums, persistent/frequent nosebleeds, unusually heavy/prolonged menstrual flow, pink/dark urine, coughing up blood, vomit that is bloody or looks like coffee grounds, severe headache, dizziness/fainting, unusual or persistent tiredness/weakness, bloody/black/tarry stools, chest pain, shortness of breath, difficulty swallowing. Tell your doctor right away if any of these unlikely but serious side effects occur: persistent nausea/vomiting, severe stomach/abdominal pain, yellowing eyes/skin. This drug rarely has caused very serious (possibly fatal) problems if its effects lead to small blood clots (usually at the beginning of treatment). This can lead to severe skin/tissue damage that may require surgery or amputation if left untreated. Patients with certain blood conditions (protein C or S deficiency) may be at greater risk. Get medical help right away if any of these rare but serious side effects occur: painful/red/purplish patches on the skin (such as on the toe, breast, abdomen), change in the  amount of urine, vision changes, confusion, slurred speech, weakness on one side of the body. A very serious allergic reaction to this drug is rare. However, get medical help right away if you notice any symptoms of a serious allergic reaction, including: rash, itching/swelling (especially of the face/tongue/throat), severe dizziness, trouble breathing. This is not a complete list of possible side effects. If you notice other effects not listed above, contact your doctor or pharmacist. In the [place] - Call your doctor for medical advice about side effects. You may report side effects to FDA at 8-818-TKI-2375. In [place] - Call your doctor for medical advice about side effects. You may report side effects to [facility] [place] at [phone].      PRECAUTIONS:  Before taking warfarin, tell your doctor or pharmacist if you are allergic to it; or if you have any other allergies. This product may contain inactive ingredients, which can cause allergic reactions or other problems. Talk to your pharmacist for more details. Before using this medication, tell your doctor or pharmacist your medical history, especially of: blood disorders (such as anemia, hemophilia), bleeding problems (such as bleeding of the stomach/intestines, bleeding in the brain), blood vessel disorders (such as aneurysms), recent major injury/surgery, liver disease, alcohol use, mental/mood disorders (including memory problems), frequent falls/injuries. It is important that all your doctors and dentists know that you take warfarin. Before having surgery or any medical/dental procedures, tell your doctor or dentist that you are taking this medication and about all the products you use (including prescription drugs, nonprescription drugs, and herbal products). Avoid getting injections into the muscles. If you must have an injection into a muscle (for example, a flu shot), it should be given in the arm. This way, it will be easier to check for bleeding and/or  apply pressure bandages. This medication may cause stomach bleeding. Daily use of alcohol while using this medicine will increase your risk for stomach bleeding and may also affect how this medication works. Limit or avoid alcoholic beverages. If you have not been eating well, if you have an illness or infection that causes fever, vomiting, or diarrhea for more than 2 days, or if you start using any antibiotic medications, contact your doctor or pharmacist immediately because these conditions can affect how warfarin works. This medication can cause heavy bleeding. To lower the chance of getting cut, bruised, or injured, use great caution with sharp objects like safety razors and nail cutters. Use an electric razor when shaving and a soft toothbrush when brushing your teeth. Avoid activities such as contact sports. If you fall or injure yourself, especially if you hit your head, call your doctor immediately. Your doctor may need to check you. The Food & Drug Administration has stated that generic warfarin products are interchangeable. However, consult your doctor or pharmacist before switching warfarin products. Be careful not to take more than one medication that contains warfarin unless specifically directed by the doctor or health care provider who is monitoring your warfarin treatment. Older adults may be at greater risk for bleeding while using this drug. This medication is not recommended for use during pregnancy because of serious (possibly fatal) harm to an unborn baby. Discuss the use of reliable forms of birth control with your doctor. If you become pregnant or think you may be pregnant, tell your doctor immediately. If you are planning pregnancy, discuss a plan for managing your condition with your doctor before you become pregnant. Your doctor may switch the type of medication you use during pregnancy. Very small amounts of this medication may pass into breast milk but is unlikely to harm a nursing  "infant. Consult your doctor before breast-feeding.      DRUG INTERACTIONS:  Drug interactions may change how your medications work or increase your risk for serious side effects. This document does not contain all possible drug interactions. Keep a list of all the products you use (including prescription/nonprescription drugs and herbal products) and share it with your doctor and pharmacist. Do not start, stop, or change the dosage of any medicines without your doctor's approval. Warfarin interacts with many prescription, nonprescription, vitamin, and herbal products. This includes medications that are applied to the skin or inside the vagina or rectum. The interactions with warfarin usually result in an increase or decrease in the \"blood-thinning\" (anticoagulant) effect. Your doctor or other health care professional should closely monitor you to prevent serious bleeding or clotting problems. While taking warfarin, it is very important to tell your doctor or pharmacist of any changes in medications, vitamins, or herbal products that you are taking. Some products that may interact with this drug include: capecitabine, imatinib, mifepristone. Aspirin, aspirin-like drugs (salicylates), and nonsteroidal anti-inflammatory drugs (NSAIDs such as ibuprofen, naproxen, celecoxib) may have effects similar to warfarin. These drugs may increase the risk of bleeding problems if taken during treatment with warfarin. Carefully check all prescription/nonprescription product labels (including drugs applied to the skin such as pain-relieving creams) since the products may contain NSAIDs or salicylates. Talk to your doctor about using a different medication (such as acetaminophen) to treat pain/fever. Low-dose aspirin and related drugs (such as clopidogrel, ticlopidine) should be continued if prescribed by your doctor for specific medical reasons such as heart attack or stroke prevention. Consult your doctor or pharmacist for more " details. Many herbal products interact with warfarin. Tell your doctor before taking any herbal products, especially bromelains, coenzyme Q10, cranberry, danshen, dong quai, fenugreek, garlic, ginkgo biloba, ginseng, and Campos's wort, among others. This medication may interfere with a certain laboratory test to measure theophylline levels, possibly causing false test results. Make sure laboratory personnel and all your doctors know you use this drug.      OVERDOSE:  If overdose is suspected, contact a poison control center or emergency room immediately. [place] residents can call the [place] National Poison Hotline at 1-728.324.8900. [place] residents can call a provincial poison control center. Symptoms of overdose may include: bloody/black/tarry stools, pink/dark urine, unusual/prolonged bleeding.      NOTES:  Do not share this medication with others. Laboratory and/or medical tests (such as INR, complete blood count) must be performed periodically to monitor your progress or check for side effects. Consult your doctor for more details.      MISSED DOSE:  For the best possible benefit, do not miss any doses. If you do miss a dose and remember on the same day, take it as soon as you remember. If you remember on the next day, skip the missed dose and resume your usual dosing schedule. Do not double the dose to catch up because this could increase your risk for bleeding. Keep a record of missed doses to give to your doctor or pharmacist. Contact your doctor or pharmacist if you miss 2 or more doses in a row.      STORAGE:  Store at room temperature away from light and moisture. Do not store in the bathroom. Keep all medications away from children and pets. Do not flush medications down the toilet or pour them into a drain unless instructed to do so. Properly discard this product when it is  or no longer needed. Consult your pharmacist or local waste disposal company for more details about how to safely discard your  product.      MEDICAL ALERT:  Your condition and medication can cause complications in a medical emergency. For information about enrolling in MedicAlert, call 1-188.356.3024 ([place]) or 1-115.482.4598 ([place]).      Information last revised October 2010 Copyright(c) 2010 [employer]             Depression / Suicide Risk    As you are discharged from this [facility] facility, it is important to learn how to keep safe from harming yourself.    Recognize the warning signs:  · Abrupt changes in personality, positive or negative- including increase in energy   · Giving away possessions  · Change in eating patterns- significant weight changes-  positive or negative  · Change in sleeping patterns- unable to sleep or sleeping all the time   · Unwillingness or inability to communicate  · Depression  · Unusual sadness, discouragement and loneliness  · Talk of wanting to die  · Neglect of personal appearance   · Rebelliousness- reckless behavior  · Withdrawal from people/activities they love  · Confusion- inability to concentrate     If you or a loved one observes any of these behaviors or has concerns about self-harm, here's what you can do:  · Talk about it- your feelings and reasons for harming yourself  · Remove any means that you might use to hurt yourself (examples: pills, rope, extension cords, firearm)  · Get professional help from the community (Mental Health, Substance Abuse, psychological counseling)  · Do not be alone:Call your Safe Contact- someone whom you trust who will be there for you.  · Call your local CRISIS HOTLINE 693-4063 or 464-519-3728  · Call your local Children's Mobile Crisis Response Team [place] (906) 604-6192 or www.GridX  · Call the toll free National Suicide Prevention Hotlines   · National Suicide Prevention Lifeline 306-475-QZBC (4114)  · National Hope Line Network 800-SUICIDE (445-0378)            Mitral Valve Regurgitation    Mitral valve regurgitation, also called  mitral regurgitation, is a condition in which some blood leaks back (regurgitates) through the mitral valve in the heart. The mitral valve is located between the upper left chamber (left atrium) and the lower left chamber (left ventricle) of the heart. When blood travels through the heart, it goes from the left atrium to the left ventricle and then out to the body. Normally, the mitral valve opens when the atrium pumps blood into the ventricle, and it closes when the ventricle pumps blood out to the body.  Mitral valve regurgitation happens when the mitral valve does not close properly. As a result, blood in the ventricle leaks back into the atrium. Mitral valve regurgitation causes the heart to work harder to pump blood. If the condition is mild, a person may not have symptoms. However, over time, this can lead to heart failure.  What are the causes?  This condition may be caused by:  · A condition in which the mitral valve does not close completely when the heart pumps blood (mitral valve prolapse).  · Heart valve infection (endocarditis).  · Certain types of heart disease.  · A condition that causes inflammation of the heart, blood vessels, or joints (rheumatic fever).  · Certain conditions that are present at birth (congenital heart defect).  · Previous radiation therapy to the chest area.  · Damage to the mitral valve, such as from injury (trauma) to the heart or a heart attack.  · Certain combinations of weight-loss (anti-obesity) medicines.  What are the signs or symptoms?  In some cases of mild to moderate mitral regurgitation, there are no symptoms.  Symptoms of this condition include:  · Shortness of breath.  · Fatigue.  · Activity intolerance.  · Cough.  Severe symptoms of this condition include:  · Suddenly waking up at night with difficulty breathing.  · Fast or irregular heartbeat.  · Swelling in the lower legs, ankles, and feet.  · Fluid in the lungs that makes it very hard to breathe (pulmonary  edema).  How is this diagnosed?  This condition may be diagnosed based on:  · A physical exam. Your health care provider will listen to your heart for an abnormal heart sound (murmur).  · Tests to confirm the diagnosis. These may include:  ? A test that creates ultrasound images of the heart (echocardiogram). This test allows your health care provider to see how the heart valves work while your heart is beating.  ? Chest X-ray.  ? A test that records the electrical impulses of the heart (electrocardiogram, or ECG).  ? A test that looks at the structure and function of the heart (cardiac catheterization).  How is this treated?  This condition may be treated with:  · Medicines. These may be given to treat symptoms and prevent complications.  · Surgery or catheter-based procedures to repair or replace the mitral valve. This may be done in severe, long-term (chronic) cases.  Follow these instructions at home:  Eating and drinking    · Eat a heart-healthy diet that includes whole grains, fresh fruits and vegetables, low-fat (lean) proteins, and low-fat or nonfat dairy products. Consider working with a dietitian to help you make healthy food choices.  · Limit how much salt (sodium) you eat as told by your health care provider. Follow instructions from your health care provider about any other eating or drinking restrictions, such as limiting foods that are high in fat and processed sugars.  · Use healthy cooking methods, such as roasting, grilling, broiling, baking, poaching, steaming, or stir-frying.  Alcohol use  · Do not drink alcohol if:  ? Your health care provider tells you not to drink.  ? You are pregnant, may be pregnant, or are planning to become pregnant.  · If you drink alcohol:  ? Limit how much you use to:  § 0-1 drink a day for women.  § 0-2 drinks a day for men.  ? Be aware of how much alcohol is in your drink. In the [place] one drink equals one 12 oz bottle of beer (355 mL), one 5 oz glass of wine (148  mL), or one 1½ oz glass of hard liquor (44 mL).  Activity  · Return to your normal activities as told by your health care provider. Ask your health care provider what activities are safe for you.  · Regular exercise is important for the health of your heart and for maintaining a healthy weight. Ask your health care provider what type of exercise is safe for you. You may need to avoid strenuous exercise.  Lifestyle  · Maintain a healthy weight.  · Do not use any products that contain nicotine or tobacco, such as cigarettes, e-cigarettes, and chewing tobacco. If you need help quitting, ask your health care provider.  · Find ways to manage stress. If you need help with this, ask your health care provider.  General instructions  · Take over-the-counter and prescription medicines only as told by your health care provider.  · Work closely with your health care provider to manage any other health conditions you have, such as diabetes or high blood pressure.  · If you plan to become pregnant, talk with your health care provider first.  · Keep all follow-up visits as told by your health care provider. This is important.  Contact a health care provider if you:  · Have a fever.  · Feel more tired than usual when doing physical activity.  · Have a dry cough.  Get help right away if you:  · Have shortness of breath.  · Develop chest pain.  · Have swelling in your hands, feet, ankles, or abdomen that is getting worse.  · Have trouble staying awake or you faint.  · Feel dizzy or unsteady.  · Suddenly gain weight.  · Feel confused.  These symptoms may represent a serious problem that is an emergency. Do not wait to see if the symptoms will go away. Get medical help right away. Call your local emergency services (911 in the [place] Do not drive yourself to the hospital.  Summary  · Mitral valve regurgitation, also called mitral regurgitation, is a condition in which some blood leaks back (regurgitates) through the mitral valve in the  heart.  · Depending on how severe your condition is, you may be treated with medicines, catheter-based procedures, or surgery.  · Practice heart-healthy habits to manage this condition. These include limiting alcohol, avoiding nicotine and tobacco, and eating a heart-healthy diet that is low in salt (sodium).  This information is not intended to replace advice given to you by your health care provider. Make sure you discuss any questions you have with your health care provider.  Document Released: 03/07/2006 Document Revised: 12/01/2019 Document Reviewed: 12/01/2019  [employer]vier Patient Education © 2020 [employer]          Aortic Valve Regurgitation    Aortic valve regurgitation is a condition that happens when the aortic valve does not close all the way. The aortic valve is a gate-like structure between the lower left chamber of the heart (left ventricle) and the main blood vessel that supplies blood to the rest of the body (aorta). The aortic valve opens when the left ventricle squeezes to pump blood into the aorta, and it closes when the left ventricle relaxes.  In aortic valve regurgitation, which may also be called aortic insufficiency, blood in the aorta leaks through the aortic valve after it has closed. This causes the heart to work harder than usual. If aortic valve regurgitation is not treated, it causes enlargement and weakening of the left ventricle. This can result in heart failure, abnormal heart rhythms (arrhythmias), and other dangerous conditions. If this condition develops suddenly, it may need to be treated with emergency surgery.  What are the causes?  This condition may be caused by anything that weakens the aortic valve, such as:  · Severe high blood pressure (hypertension).  · Infection of the inner lining of the heart or the heart valves (endocarditis).  · A ballooning of a weak spot in the aorta wall (aortic aneurysm).  · A tear or separation of the inner walls of the aorta (aortic  dissection).  · Injury (trauma) that damages the aortic valve.  · Certain medicines.  · Disease of a protein in the body called collagen (collagen vascular disease).  · A heart problem (bicuspid aortic valve) that is present at birth (congenital).  · An inflammatory condition that can develop after an untreated strep throat infection (rheumatic fever).  · Complications during or after a heart surgery. This is rare.  What are the signs or symptoms?  Symptoms of this condition include:  · Fatigue.  · Shortness of breath.  · Difficulty breathing while lying flat (orthopnea). You may need to sleep on two or more pillows to breathe better.  · Chest discomfort (angina).  · Head bobbing.  · A fluttering feeling in the chest (palpitations).  · An irregular or faster-than-normal heartbeat.  Symptoms usually develop gradually, unless this condition was caused by a major injury or by endocarditis.  How is this diagnosed?  This condition is diagnosed based on:  · A physical exam.  · An imaging test that uses sound waves to produce images of the heart (echocardiogram).  You may also have other tests to confirm the diagnosis, including:  · Chest X-ray.  · MRI.  · A test that records the electrical impulses of the heart (electrocardiogram, ECG).  · CT angiogram (CTA). In this procedure, a large X-ray machine, called a CT scanner, takes detailed pictures of blood vessels after dye has been injected into the vessels.  · Aortic angiogram. In this procedure, X-ray images are taken after dye has been injected into blood vessels. This tests the function of the aorta.  How is this treated?  Treatment depends on your symptoms, how severe the condition is, and what problems the condition is causing. Treatment may include:  · Observation. If your condition is mild, you may not need treatment. However, you will need to have your condition checked regularly to make sure it is not getting worse or causing serious problems.  · Medicines that  help the heart work more efficiently.  · Surgery to repair or replace the valve, in severe cases. Surgery is usually recommended if the left ventricle enlarges beyond a certain point. If aortic valve regurgitation occurs suddenly, surgery may be needed immediately.  Follow these instructions at home:  · Take over-the-counter and prescription medicines only as told by your health care provider.  · Do not use any products that contain nicotine or tobacco, such as cigarettes, e-cigarettes, and chewing tobacco. If you need help quitting, ask your health care provider.  · If directed by your health care provider, avoid heavy weight lifting and contact sports such as football.  · Follow instructions from your health care provider about eating or drinking restrictions. Your health care provider may recommend that you:  ? Limit alcohol use to:  § 0-1 drink a day for women.  § 0-2 drinks a day for men.  ? Be aware of how much alcohol is in your drink. In the [place] one drink equals one 12 oz bottle of beer (355 mL), one 5 oz glass of wine (148 mL), or one 1½ oz glass of hard liquor (44 mL).  ? Eat foods that are high in fiber, such as fresh fruits and vegetables, whole grains, and beans.  ? Eat less salt (sodium) and salty foods. Check ingredients and nutrition facts on packaged foods and beverages.  · Keep all follow-up visits as told by your health care provider. This is important. You may need regular tests to monitor your condition and check how well your heart is pumping blood.  Contact a health care provider if:  · Your angina symptoms are more frequent or seem to be getting worse.  · Your breathing problems seem to be getting worse.  · You feel dizzy or close to fainting.  · You have swelling in your feet, ankles, legs, or abdomen.  · You urinate more than usual during the night (nocturia).  · You have an unexplained fever that lasts 2 days or longer.  · You develop new symptoms.  Get help right away if you:  · Have  severe chest pain.  · Have severe shortness of breath.  · Feel rapid or irregular heartbeats.  · Feel light-headed or you faint.  · Have sudden, unexplained weight gain.  Summary  · Aortic valve regurgitation is a condition in which the aortic valve does not close all the way. This causes the heart to work harder than usual.  · This condition may be treated with observation, medicines, or surgery.  · Take over-the-counter and prescription medicines only as told by your health care provider.  · Eat less salt (sodium) and salty foods. Check ingredients and nutrition facts on packaged foods and beverages.  · Get help right away if you have severe chest pain, shortness of breath, irregular heartbeats, sudden weight gain, or if you feel light-headed or you faint.  This information is not intended to replace advice given to you by your health care provider. Make sure you discuss any questions you have with your health care provider.  Document Released: 06/23/2004 Document Revised: 01/22/2020 Document Reviewed: 09/10/2019  [employer] Patient Education © 2020 [employer]            Home Oxygen Use, Adult  When a medical condition keeps you from getting enough oxygen, your health care provider may instruct you to take extra oxygen at home. Your health care provider will let you know:  · When to take oxygen.  · For how long to take oxygen.  · How quickly oxygen should be delivered (flow rate), in liters per minute (LPM or L/M).  Home oxygen can be given through:  · A mask.  · A nasal cannula. This is a device or tube that goes in the nostrils.  · A transtracheal catheter. This is a small, flexible tube placed in the trachea.  · A tracheostomy. This is a surgically made opening in the trachea.  These devices are connected with tubing to an oxygen source, such as:  · A tank. Tanks hold oxygen in gas form. They must be replaced when the oxygen is used up.  · A liquid oxygen device. This holds oxygen in liquid form. It must be  replaced when the oxygen is used up.  · An oxygen concentrator machine. This filters oxygen in the room. It uses electricity, so you must have a backup cylinder of oxygen in case the power goes out.  Supplies needed:  To use oxygen, you will need:  · A mask, nasal cannula, transtracheal catheter, or tracheostomy.  · An oxygen tank, a liquid oxygen device, or an oxygen concentrator.  · The tape that your health care provider recommends (optional).  If you use a transtracheal catheter and your prescribed flow rate is 1 LPM or greater, you will also need a humidifier.  Risks and complications  · Fire. This can happen if the oxygen is exposed to a heat source, flame, or spark.  · Injury to skin. This can happen if liquid oxygen touches your skin.  · Organ damage. This can happen if you get too little oxygen.  How to use oxygen  Your health care provider or a representative from your medical device company will show you how to use your oxygen device. Follow her or his instructions. The instructions may look something like this:  1. Wash your hands.  2. If you use an oxygen concentrator, make sure it is plugged in.  3. Place one end of the tube into the port on the tank, device, or machine.  4. Place the mask over your nose and mouth. Or, place the nasal cannula and secure it with tape if instructed. If you use a tracheostomy or transtracheal catheter, connect it to the oxygen source as directed.  5. Make sure the liter-flow setting on the machine is at the level prescribed by your health care provider.  6. Turn on the machine or adjust the knob on the tank or device to the correct liter-flow setting.  7. When you are done, turn off and unplug the machine, or turn the knob to OFF.  How to clean and care for the oxygen supplies  Nasal cannula  · Clean it with a warm, wet cloth daily or as needed.  · Wash it with a liquid soap once a week.  · Rinse it thoroughly once or twice a week.  · Replace it every 2-4 weeks.  · If you  "have an infection, such as a cold or pneumonia, change the cannula when you get better.  Mask  · Replace it every 2-4 weeks.  · If you have an infection, such as a cold or pneumonia, change the mask when you get better.  Humidifier bottle  · Wash the bottle between each refill:  ? Wash it with soap and warm water.  ? Rinse it thoroughly.  ? Disinfect it and its top.  ? Air-dry it.  · Make sure it is dry before you refill it.  Oxygen concentrator  · Clean the air filter at least twice a week according to directions from your home medical equipment and service company.  · Wipe down the cabinet every day. To do this:  ? Unplug the unit.  ? Wipe down the cabinet with a damp cloth.  ? Dry the cabinet.  Other equipment  · Change any extra tubing every 1-3 months.  · Follow instructions from your health care provider about taking care of any other equipment.  Safety tips  Fire safety tips    · Keep your oxygen and oxygen supplies at least 5 ft away from sources of heat, flames, and pringle at all times.  · Do not allow smoking near your oxygen. Put up \"no smoking\" signs in your home. Avoid smoking areas when in public.  · Do not use materials that can burn (are flammable) while you use oxygen.  · When you go to a restaurant with portable oxygen, ask to be seated in the nonsmoking section.  · Keep a fire extinguisher close by. Let your fire department know that you have oxygen in your home.  · Test your home smoke detectors regularly.  Traveling  · Secure your oxygen tank in the vehicle so that it does not move around. Follow instructions from your medical device company about how to safely secure your tank.  · Make sure you have enough oxygen for the amount of time you will be away from home.  · If you are planning air travel, contact the airline to find out if they allow the use of an approved portable oxygen concentrator. You may also need documents from your health care provider and medical device company before you " travel.  General safety tips  · If you use an oxygen cylinder, make sure it is in a stand or secured to an object that will not move (fixed object).  · If you use liquid oxygen, make sure its container is kept upright.  · If you use an oxygen concentrator:  ? Tell your electric company. Make sure you are given priority service in the event that your power goes out.  ? Avoid using extension cords, if possible.  Follow these instructions at home:  · Use oxygen only as told by your health care provider.  · Do not use alcohol or other drugs that make you relax (sedating drugs) unless instructed. They can slow down your breathing rate and make it hard to get in enough oxygen.  · Know how and when to order a refill of oxygen.  · Always keep a spare tank of oxygen. Plan ahead for holidays when you may not be able to get a prescription filled.  · Use water-based lubricants on your lips or nostrils. Do not use oil-based products like petroleum jelly.  · To prevent skin irritation on your cheeks or behind your ears, tuck some gauze under the tubing.  Contact a health care provider if:  · You get headaches often.  · You have shortness of breath.  · You have a lasting cough.  · You have anxiety.  · You are sleepy all the time.  · You develop an illness that affects your breathing.  · You cannot exercise at your regular level.  · You are restless.  · You have difficult or irregular breathing, and it is getting worse.  · You have a fever.  · You have persistent redness under your nose.  Get help right away if:  · You are confused.  · You have blue lips or fingernails.  · You are struggling to breathe.  Summary  · Your health care provider or a representative from your medical device company will show you how to use your oxygen device. Follow her or his instructions.  · If you use an oxygen concentrator, make sure it is plugged in.  · Make sure the liter-flow setting on the machine is at the level prescribed by your health care  provider.  · Keep your oxygen and oxygen supplies at least 5 ft away from sources of heat, flames, and pringle at all times.  This information is not intended to replace advice given to you by your health care provider. Make sure you discuss any questions you have with your health care provider.  Document Released: 03/09/2005 Document Revised: 06/06/2019 Document Reviewed: 07/11/2017  [employer] Patient Education © 2020 [employer]          Coronary Artery Bypass Grafting    Coronary artery bypass grafting (CABG) is a surgery that is done when arteries of the heart have become narrow or blocked. This is often caused by the buildup of fat called plaques. These arteries give the heart the oxygen and nutrients it needs to pump blood to your body.  During CABG, a section of blood vessel from another part of the body is taken. This section is called a graft. The graft is placed where there is narrowing or blockage.  Tell your doctor about:  · Any allergies you have.  · All medicines you are taking. Tell him or her about any steroids, blood thinners, vitamins, herbs, eye drops, creams, and over-the-counter medicines.  · Any problems you or family members have had with anesthetic medicines.  · Any blood disorders you have.  · Any surgeries you have had.  · Any medical conditions you have.  · Whether you are pregnant or may be pregnant.  What are the risks?  Generally, this is a safe procedure. However, problems may occur, including:  · Bleeding. You may need to get blood through an IV tube (transfusions).  · Infection.  · Allergic reactions to medicines or dyes.  · Pain at the surgical site.  · Damage to organs or other parts of the body.  · Short-term memory loss, confusion, and personality changes.  · Heart rhythm problems (arrhythmias).  · Stroke.  · Heart attack during or after surgery.  · Kidney failure.  What happens before the procedure?  Staying hydrated  Follow instructions from your doctor about hydration. These  may include:  · Up to 2 hours before the procedure - you may continue to drink clear liquids, such as:  ? Water.  ? Clear fruit juice.  ? Black coffee.  ? Plain tea.    Eating and drinking  Follow instructions from your doctor about eating and drinking. These may include:  · 8 hours before the procedure - stop eating heavy meals or foods, such as:  ? Meat.  ? Fried foods.  ? Fatty foods.  · 6 hours before the procedure - stop eating light meals or foods, such as:  ? Toast.  ? Cereal.  · 6 hours before the procedure - stop drinking milk or drinks that contain milk.  · 2 hours before the procedure - stop drinking clear liquids.  Medicines  · Take over-the-counter and prescription medicines only as told by your doctor.  · Ask your doctor about:  ? Changing or stopping your normal medicines. This is important.  ? Taking aspirin and ibuprofen. Do not take these medicines unless your doctor tells you to take them.  ? Taking over-the-counter medicines, vitamins, herbs, and supplements.  General instructions  · Ask your doctor:  ? How your surgery site will be marked.  ? What steps will be taken to help prevent the spread of germs. These may include:  § Removing hair at the surgery site.  § Washing skin with a germ-killing soap.  § Taking antibiotic medicine.  · You may be asked to shower with a germ-killing soap.  · For 3-6 weeks before the CABG, do not use any products that contain nicotine or tobacco. These include cigarettes, e-cigarettes, and chewing tobacco. Quitting smoking is one of the best things you can do for your heart health. If you need help quitting, ask your doctor.  · Talk with your doctor about where the grafts will be taken from for your surgery.  What happens during the procedure?  · An IV tube will be placed into one of your veins.  · You will be given one or more of the following:  ? A medicine to help you relax (sedative).  ? A medicine to make you fall asleep (general anesthetic).  · A cut  (incision) will be made down the front of the chest through the breastbone (sternum).  · The breastbone will be opened so your heart can be seen.  · You may or may not be placed on a heart-lung bypass machine.  ? If this machine is used, your heart will be briefly stopped.  ? This machine will give oxygen to your blood while your heart is being worked on.  · A section of blood vessel will be removed from another part of your body (often the chest, arm, or leg).  · The blood vessel will be attached above and below the blocked artery of your heart. This may be done on more than one artery of the heart.  · You will be taken off the heart-lung machine if it was used.  · If your heart was stopped, it will be restarted.  · Your chest will be closed with special wire that will hold your bones together as they heal.  · Your cuts will be closed with stitches (sutures), skin glue, or skin tape (adhesive) strips.  · A bandage (dressing) will be placed over the cuts.  · Tubes will stay in your chest. They will be connected to a device that will help drain fluid and reinflate the lungs.  The procedure may vary among doctors and hospitals.  What happens after the procedure?  · You will be monitored until you leave the hospital. This includes checking your blood pressure, heart rate, breathing rate, and blood oxygen level.  · You may wake up with a tube in your throat. This tube will help you breathe. You may be connected to a breathing machine. You will not be able to talk when the tube is in. The tube will be taken out when it is safe.  · You will be groggy and may have some pain. You will be given medicine to help the pain.  · You may be in the intensive care unit for 1-2 days.  · You may be given oxygen to help you breathe.  · You will be shown how to do deep breathing exercises.  · You may have to wear compression stockings. These stockings help to prevent blood clots and reduce swelling in your legs.  · You may be given new  medicines to take.  · Cardiac rehab will be started while you are in the hospital. This may include education and exercises to help you recover from your surgery.  Summary  · During CABG, a section of blood vessel from another part of the body is taken out. It is then placed where there is narrowing or blockage.  · For 3-6 weeks before the procedure, do not use any products that contain nicotine or tobacco. Quitting smoking is one of the best things you can do for your heart health. If you need help quitting, ask your doctor.  · You may wake up with a tube in your throat. This tube will help you breathe. You will not be able to talk when the tube is in. The tube will be taken out when it is safe.  This information is not intended to replace advice given to you by your health care provider. Make sure you discuss any questions you have with your health care provider.  Document Released: 12/23/2014 Document Revised: 08/27/2019 Document Reviewed: 08/27/2019  [employer] Patient Education © 2020 [employer]          Coronary Artery Bypass Grafting, Care After  This sheet gives you information about how to care for yourself after your procedure. Your doctor may also give you more specific instructions. If you have problems or questions, call your doctor.  What can I expect after the procedure?  After the procedure, it is common to:  · Feel sick to your stomach (nauseous).  · Not want to eat as much as normal (lack of appetite).  · Have trouble pooping (constipation).  · Have weakness and tiredness (fatigue).  · Feel sad (depressed) or grouchy (irritable).  · Have pain or discomfort around the cuts from surgery (incisions).  Follow these instructions at home:  Medicines  · Take over-the-counter and prescription medicines only as told by your doctor. Do not stop taking medicines or start any new medicines unless your doctor says it is okay.  · If you were prescribed an antibiotic medicine, take it as told by your doctor. Do  not stop taking the antibiotic even if you start to feel better.  Incision care    · Follow instructions from your doctor about how to take care of your cuts from surgery. Make sure you:  ? Wash your hands with soap and water before and after you change your bandage (dressing). If you cannot use soap and water, use hand .  ? Change your bandage as told by your doctor.  ? Leave stitches (sutures), skin glue, or skin tape (adhesive) strips in place. They may need to stay in place for 2 weeks or longer. If tape strips get loose and curl up, you may trim the loose edges. Do not remove tape strips completely unless your doctor says it is okay.  · Make sure the surgery cuts are clean, dry, and protected.  · Check your cut areas every day for signs of infection. Check for:  ? More redness, swelling, or pain.  ? More fluid or blood.  ? Warmth.  ? Pus or a bad smell.  · If cuts were made in your legs:  ? Avoid crossing your legs.  ? Avoid sitting for long periods of time. Change positions every 30 minutes.  ? Raise (elevate) your legs when you are sitting.  Bathing  · Do not take baths, swim, or use a hot tub until your doctor says it is okay.  · Only take sponge baths. Pat the surgery cuts dry. Do not rub the cuts to dry.  · Ask your doctor when you can shower.  Eating and drinking    · Eat foods that are high in fiber, such as beans, nuts, whole grains, and raw fruits and vegetables. Any meats you eat should be lean cut. Avoid canned, processed, and fried foods. This can help prevent trouble pooping. This is also a part of a heart-healthy diet.  · Drink enough fluid to keep your pee (urine) pale yellow.  · Do not drink alcohol until you are fully recovered. Ask your doctor when it is safe to drink alcohol.  Activity  · Rest and limit your activity as told by your doctor. You may be told to:  ? Stop any activity right away if you have chest pain, shortness of breath, irregular heartbeats, or dizziness. Get help  right away if you have any of these symptoms.  ? Move around often for short periods or take short walks as told by your doctor. Slowly increase your activities.  ? Avoid lifting, pushing, or pulling anything that is heavier than 10 lb (4.5 kg) for at least 6 weeks or as told by your doctor.  · Do physical therapy or a cardiac rehab (cardiac rehabilitation) program as told by your doctor.  ? Physical therapy involves doing exercises to maintain movement and build strength and endurance.  ? A cardiac rehab program includes:  § Exercise training.  § Education.  § Counseling.  · Do not drive until your doctor says it is okay.  · Ask your doctor when you can go back to work.  · Ask your doctor when you can be sexually active.  General instructions  · Do not drive or use heavy machinery while taking prescription pain medicine.  · Do not use any products that contain nicotine or tobacco. These include cigarettes, e-cigarettes, and chewing tobacco. If you need help quitting, ask your doctor.  · Take 2-3 deep breaths every few hours during the day while you get better. This helps expand your lungs and prevent problems.  · If you were given a device called an incentive spirometer, use it several times a day to practice deep breathing. Support your chest with a pillow or your arms when you take deep breaths or cough.  · Wear compression stockings as told by your doctor.  · Weigh yourself every day. This helps to see if your body is holding (retaining) fluid that may make your heart and lungs work harder.  · Keep all follow-up visits as told by your doctor. This is important.  Contact a doctor if:  · You have more redness, swelling, or pain around any cut.  · You have more fluid or blood coming from any cut.  · Any cut feels warm to the touch.  · You have pus or a bad smell coming from any cut.  · You have a fever.  · You have swelling in your ankles or legs.  · You have pain in your legs.  · You gain 2 lb (0.9 kg) or more a  "day.  · You feel sick to your stomach or you throw up (vomit).  · You have watery poop (diarrhea).  Get help right away if:  · You have chest pain that goes to your jaw or arms.  · You are short of breath.  · You have a fast or irregular heartbeat.  · You notice a \"clicking\" in your breastbone (sternum) when you move.  · You have any signs of a stroke. \"BE FAST\" is an easy way to remember the main warning signs:  ? B - Balance. Signs are dizziness, sudden trouble walking, or loss of balance.  ? E - Eyes. Signs are trouble seeing or a change in how you see.  ? F - Face. Signs are sudden weakness or loss of feeling of the face, or the face or eyelid drooping on one side.  ? A - Arms. Signs are weakness or loss of feeling in an arm. This happens suddenly and usually on one side of the body.  ? S - Speech. Signs are sudden trouble speaking, slurred speech, or trouble understanding what people say.  ? T - Time. Time to call emergency services. Write down what time symptoms started.  · You have other signs of a stroke, such as:  ? A sudden, very bad headache with no known cause.  ? Feeling sick to your stomach.  ? Throwing up.  ? Jerky movements you cannot control (seizure).  These symptoms may be an emergency. Do not wait to see if the symptoms will go away. Get medical help right away. Call your local emergency services (911 in the [place] Do not drive yourself to the hospital.  Summary  · After the procedure, it is common to have pain or discomfort in the cuts from surgery (incisions).  · Do not take baths, swim, or use a hot tub until your doctor says it is okay.  · Slowly increase your activities. You may need physical therapy or cardiac rehab.  · Weigh yourself every day. This helps to see if your body is holding fluid.  This information is not intended to replace advice given to you by your health care provider. Make sure you discuss any questions you have with your health care provider.  Document Released: " 12/23/2014 Document Revised: 08/27/2019 Document Reviewed: 08/27/2019  [employer] Patient Education © 2020 [employer]          Bleeding Precautions When on Anticoagulant Therapy, Adult  Anticoagulant therapy, also called blood thinner therapy, is medicine that helps to prevent and treat blood clots. The medicine works by stopping blood clots from forming or growing. Blood clots that form in your blood vessels can be dangerous. They can break loose and travel to the heart, lungs, or brain. This increases the risk of a heart attack, stroke, or blocked lung artery (pulmonary embolism).  Anticoagulants also increase the risk of bleeding. Try to protect yourself from cuts and other injuries that can cause bleeding. It is important to take anticoagulants exactly as told by your health care provider.  Why do I need to be on anticoagulant therapy?  You may need this medicine if you are at risk of developing a blood clot. Conditions that increase your risk of a blood clot include:  · Being born with heart disease or a heart malformation (congenital heart disease).  · Developing heart disease.  · Having had surgery, such as valve replacement.  · Having had a serious accident or other type of severe injury (trauma).  · Having certain types of cancer.  · Having certain diseases that can increase blood clotting.  · Having a high risk of stroke or heart attack.  · Having atrial fibrillation (AF).  What are the common anticoagulant medicines?  There are several types of anticoagulant medicines. The most common types are:  · Medicines that you take by mouth (oral medicines), such as:  ? Warfarin.  ? Novel oral anticoagulants (NOACs), such as:  ? Direct thrombin inhibitors (dabigatran).  ? Factor Xa inhibitors (apixaban, edoxaban, and rivaroxaban).  · Injections, such as:  ? Unfractionated heparin.  ? Low molecular weight heparin.  These anticoagulants work in different ways to prevent blood clots. They also have different risks  and side effects.  What do I need to remember while on anticoagulant therapy?  Taking anticoagulants  · Take your medicine at the same time every day. If you forget to take your medicine, take it as soon as you remember. Do not double your dosage of medicine if you miss a whole day. Take your normal dose and call your health care provider.  · Do not stop taking your medicine unless your health care provider approves. Stopping the medicine can increase your risk of developing a blood clot.  Taking other medicines  · Take over-the-counter and prescriptions medicines only as told by your health care provider.  · Do not take over-the-counter NSAIDs, including aspirin and ibuprofen, while you are on anticoagulant therapy. These medicines increase your risk of dangerous bleeding.  · Get approval from your health care provider before you start taking any new medicines, vitamins, or herbal products. Some of these could interfere with your therapy.  General instructions  · Keep all follow-up visits as told by your health care provider. This is important.  · If you are pregnant or trying to get pregnant, talk with a health care provider about anticoagulants. Some of these medicines are not safe to take during pregnancy.  · Tell all health care providers, including your dentist, that you are on anticoagulant therapy. It is especially important to tell providers before you have any surgery, medical procedures, or dental work done.  What precautions should I take?    · Be very careful when using knives, scissors, or other sharp objects.  · Use an electric razor instead of a blade.  · Do not use toothpicks.  · Use a soft-bristled toothbrush. Brush your teeth gently.  · Always wear shoes outdoors and wear slippers indoors.  · Be careful when cutting your fingernails and toenails.  · Place bath mats in the bathroom. If possible, install handrails as well.  · Wear gloves while you do yard work.  · Wear your seat belt.  · Prevent  falls by removing loose rugs and extension cords from areas where you walk. Use a cane or walker if you need it.  · Avoid constipation by:  ? Drinking enough fluid to keep your urine clear or pale yellow.  ? Eating foods that are high in fiber, such as fresh fruits and vegetables, whole grains, and beans.  ? Limiting foods that are high in fat and processed sugars, such as fried and sweet foods.  · Do not play contact sports or participate in other activities that have a high risk for injury.  What other precautions are important if on warfarin therapy?  If you are taking a type of anticoagulant called warfarin, make sure you:  · Work with a diet and nutrition specialist (dietitian) to make an eating plan. Do not make any sudden changes to your diet after you have started your eating plan.  · Do not drink alcohol. It can interfere with your medicine and increase your risk of an injury that causes bleeding.  · Get regular blood tests as told by your health care provider.  What are some questions to ask my health care provider?  · Why do I need anticoagulant therapy?  · What is the best anticoagulant therapy for my condition?  · How long will I need anticoagulant therapy?  · What are the side effects of anticoagulant therapy?  · When should I take my medicine? What should I do if I forget to take it?  · Will I need to have regular blood tests?  · Do I need to change my diet? Are there foods or drinks that I should avoid?  · What activities are safe for me?  · What should I do if I want to get pregnant?  Contact a health care provider if:  · You miss a dose of medicine:  ? And you are not sure what to do.  ? For more than one day.  · You have:  ? Menstrual bleeding that is heavier than normal.  ? Bloody or brown urine.  ? Easy bruising.  ? Black and tarry stool or bright red stool.  ? Side effects from your medicine.  · You feel weak or dizzy.  · You become pregnant.  Get help right away if:  · You have bleeding that  will not stop within 20 minutes from:  ? The nose.  ? The gums.  ? A cut on the skin.  · You have a severe headache or stomachache.  · You vomit or cough up blood.  · You fall or hit your head.  Summary  · Anticoagulant therapy, also called blood thinner therapy, is medicine that helps to prevent and treat blood clots.  · Anticoagulants work in different ways to prevent blood clots. They also have different risks and side effects.  · Talk with your health care provider about any precautions that you should take while on anticoagulant therapy.  This information is not intended to replace advice given to you by your health care provider. Make sure you discuss any questions you have with your health care provider.  Document Released: 11/28/2016 Document Revised: 04/08/2020 Document Reviewed: 03/06/2018  [employer] Patient Education © 2020 [employer]

## 2021-08-19 NOTE — DISCHARGE SUMMARY
"Discharge Summary    Date of Admission: 8/15/2021  Date of Discharge: 08/15/21  Discharging Attending: Ceasar Saeed M.D.   Discharging Senior Resident: Dr. Josesito Sy  Discharging Intern: Dr. Ojeda    CHIEF COMPLAINT ON ADMISSION  Chief Complaint   Patient presents with   • Shortness of Breath     Pt states she has had SOB x1 week. Pt has no hx of lung issues. Pt states \"she has been trying to get open heart surgery for her valves\". Pt is vaccinated for covid.    • Loss of Appetite     Pt states she has been \"hardly eating and drinking\". Pt states she \"has all this phlem that is keeping her from eating/drinking\".        Reason for Admission  Shortness of breath    Admission Date  8/15/2021    CODE STATUS  DNAR/DNI    HPI & HOSPITAL COURSE  Lindsay is a 88-year-old woman with history significant for atrial fibrillation, pacemaker, severe MR s/p mitraclip with recurrent severe MR and moderate AR, that was admitted on 8/15/2021 for shortness of breath.  She had previously been scheduled to have a imaging evaluation as a preop work-up at [facility] for potential heart surgery for mitral regurgitation.    On this admission, her BNP was mildly elevated at 2204, TOM initially showed an EF of 45% with global hypokinesis along with previous finding of severe valvular disease.     In regard of her dyspnea especially worse on exertion, it was originally thought to be secondary to her valvular abnormalities.  Previously TOM could not be done because of what was thought to be esophageal stricture but an EGD done in 2020 showed that patient had normal esophagus.  Cardiology was consulted for a TOM that proved to be difficult and requiring pediatric probe.  Per cardiologist assessment, patient could be a good candidate for further clipping that she would need a center of higher expertise such as the [facility].  Cardiology also thought that the patient relocating to [place] with higher altitude to sea level could " have mildly contributed to her shortness of breath likely along with her valvular disease. Cardiology also elected to not proceed with any catheterization given patient mildly elevated creatinine. The patient was recommended to follow-up with the [facility].     Patient also complained of difficulty swallowing food. She was evaluated by speech pathology with unremarkable finding in the oropharynx.  She was also evaluated with a barium esophagram with unremarkable findings as well.  At discharge, patient has been recommended to follow outpatient with a gastroenterologist.    Patient was admitted with a creatinine of 1.72 with recent baseline 2020 at 1.3-1.4.  Over the course of her admission, her creatinine had improved to about 1.5 and close left to baseline.  It is likely that she has chronic kidney disease, and per chart review, she has been seen by nephrology outpatient.    At the time of this discharge, patient prognosis appears grim given the severity of her valvular disease.  As recommended above, she will need to be further evaluated to determine if she is a candidate for surgery. She has also been evaluated for debility, and she was discharged with a wheelchair with recommendation to follow-up with primary care physician to other home health physical therapy.       Therefore, she is discharged in guarded and stable condition to home with close outpatient follow-up.    The patient met 2-midnight criteria for an inpatient stay at the time of discharge.    PHYSICAL EXAM ON DISCHARGE  Temp:  [36 °C (96.8 °F)-36.6 °C (97.8 °F)] 36.2 °C (97.2 °F)  Pulse:  [70-73] 70  Resp:  [15-18] 16  BP: ()/(51-66) 132/66  SpO2:  [95 %-100 %] 98 %    Physical Exam  Vitals and nursing note reviewed.   Constitutional:       General: She is not in acute distress.     Appearance: She is not ill-appearing, toxic-appearing or diaphoretic.      Comments: Patient looks weak, malnourished    HENT:      Head:  Normocephalic and atraumatic.      Right Ear: External ear normal.      Left Ear: External ear normal.      Nose: Nose normal. No congestion.      Mouth/Throat:      Mouth: Mucous membranes are moist.   Eyes:      General:         Right eye: No discharge.         Left eye: No discharge.   Cardiovascular:      Rate and Rhythm: Rhythm irregular.      Comments: A systolic murmer was noted on auscultation.  Pulses were weak.  Pulmonary:      Effort: Pulmonary effort is normal. No respiratory distress.      Breath sounds: Normal breath sounds. No stridor. No wheezing.   Abdominal:      General: Abdomen is flat. Bowel sounds are normal. There is no distension.      Palpations: Abdomen is soft.      Tenderness: There is no abdominal tenderness. There is no guarding.   Musculoskeletal:         General: No swelling.      Right lower leg: No edema.      Left lower leg: No edema.   Skin:     General: Skin is warm.      Coloration: Skin is not jaundiced.   Neurological:      Mental Status: She is alert and oriented to person, place, and time.       Discharge Date  08/18/21    FOLLOW UP ITEMS POST DISCHARGE  Follow[facility]up with cardiology at [facility]  Follow-up with primary care doctor  Follow-up with gastroenterology    DISCHARGE DIAGNOSES  Active Problems:    AF (atrial fibrillation) (HCC) (Chronic) POA: Yes    Hyperlipidemia (Chronic) POA: Yes    Hypothyroidism (Chronic) POA: Yes    Gastrointestinal hemorrhage with melena POA: Yes    Severe mitral regurgitation POA: Unknown    Malnutrition (HCC) POA: Yes    Chronic diastolic heart failure (HCC) POA: Yes    Dyspnea POA: Unknown    Demand ischemia (HCC) POA: Unknown    JESSICA (acute kidney injury) (HCC) POA: Unknown    GERD (gastroesophageal reflux disease) POA: Yes    Esophageal dysphagia POA: Unknown    Pre-diabetes POA: Unknown  Resolved Problems:    * No resolved hospital problems. *      FOLLOW UP  Future Appointments   Date Time Provider Department Center    9/2/2021  2:20 PM Jean Capps M.D. [facility] None   9/7/2021  3:30 PM [facility]   1/26/2022  1:45 PM PACER CHECK-CAM B [facility] None     [facility]  [address]  [facility] [id number] [phone]  Schedule an appointment as soon as possible for a visit in 2 weeks  Please call to establish with primary care provider to schedule a hospital follow up appointment.       MEDICATIONS ON DISCHARGE     Medication List      START taking these medications      Instructions   DILTIAZem  MG Cp24  Start taking on: August 19, 2021  Commonly known as: CARDIZEM CD  Replaces: diltiazem 240 MG SR capsule   Take 1 Capsule by mouth every morning for 30 doses.  Dose: 180 mg     potassium chloride SA 20 MEQ Tbcr  Commonly known as: Kdur   Take 1 Tablet by mouth 2 times a day for 30 days.  Dose: 20 mEq        CONTINUE taking these medications      Instructions   CALCIUM PO   Take 1 Tab by mouth every morning.  Dose: 1 Tablet     famotidine 10 MG tablet  Commonly known as: PEPCID   Take 1 Tablet by mouth every day.  Dose: 10 mg     ferrous sulfate 325 (65 Fe) MG tablet   Take 1 Tab by mouth every day.  Dose: 325 mg     folic acid 1 MG Tabs  Commonly known as: FOLVITE   Take 1 mg by mouth every day.  Dose: 1 mg     furosemide 40 MG Tabs  Commonly known as: LASIX   Take 1 tablet by mouth every day.  Dose: 40 mg     levothyroxine 75 MCG Tabs  Commonly known as: SYNTHROID   Take 75 mcg by mouth every morning.  Dose: 75 mcg     MAGNESIUM PO   Take 1 Tab by mouth every day.  Dose: 1 Tablet     VITAMIN D PO   Take 1 Tab by mouth every morning.  Dose: 1 Tablet     warfarin 4 MG Tabs  Commonly known as: COUMADIN   Take 1 tablet by mouth every day.  Dose: 4 mg        STOP taking these medications    diltiazem 240 MG SR capsule  Commonly known as: TIAZAC  Replaced by: DILTIAZem  MG Cp24     spironolactone 25 MG Tabs  Commonly known as: ALDACTONE            Allergies  No Known  Allergies    DIET  Orders Placed This Encounter   Procedures   • Diet Order Diet: Level 6 - Soft and Bite Sized; Liquid level: Level 0 - Thin; Second Modifier: (optional): Cardiac; Fluid modifications: (optional): 2000 ml Fluid Restriction     Standing Status:   Standing     Number of Occurrences:   1     Order Specific Question:   Diet:     Answer:   Level 6 - Soft and Bite Sized [23]     Order Specific Question:   Liquid level     Answer:   Level 0 - Thin     Order Specific Question:   Second Modifier: (optional)     Answer:   Cardiac [6]     Order Specific Question:   Fluid modifications: (optional)     Answer:   2000 ml Fluid Restriction [11]       ACTIVITY  Light duty.  Weight bearing as tolerated

## 2021-08-19 NOTE — PROGRESS NOTES
Discharge instructions given to patient at bedside, verbalizes understanding and states plans for follow-up with PCP and Cardiologist as recommended. Individualized instruction and CHF discharge information provided on new and home medication review, post-discharge activity level, and worsening of symptoms needing follow-up care. Telemetry monitor/IV cathlon removed. All belongings accounted for, all questions answered at this time. Patient discharged to home with  relative.

## 2021-08-19 NOTE — PROGRESS NOTES
[facility]    Received anticoagulation referral.    PCP: None currently  INS: Medicare  Preferred location: [facility] [place]     Called patient to establish care with no answer. Left voicemail instructing patient to call back to schedule an appointment.     David Magana, JimD

## 2021-08-23 ENCOUNTER — DOCUMENTATION (OUTPATIENT)
Dept: VASCULAR LAB | Facility: MEDICAL CENTER | Age: 86
End: 2021-08-23

## 2021-08-23 NOTE — PROGRESS NOTES
[facility] Heart and Vascular Health and Pharmacotherapy Programs     Received anticoagulation referral for warfarin due to hx of a-fib from Dr. Jean Capps on 8-10-21.    S/w pt to establish care. Pt is currently in [place]. She will c/b when she gets back to [place]    Insurance: Medicare  PCP: none  Locations to be seen: [facility] Anticoagulation/Pharmacotherapy Clinic at [phone], fax [phone]    Jessica Raphael, JimD    
no

## 2021-08-30 ENCOUNTER — DOCUMENTATION (OUTPATIENT)
Dept: VASCULAR LAB | Facility: MEDICAL CENTER | Age: 86
End: 2021-08-30

## 2021-08-30 NOTE — PROGRESS NOTES
[facility] Heart and Vascular Health and Pharmacotherapy Programs     Received anticoagulation referral for warfarin due to hx of a-fib from Dr. Jean Capps on 8-10-21.     5th call  Per last conversation, pt was in [place] and unable to schedule appt.    Attempted to contact pt to see if she is back in [place]. LVM to return my call     Insurance: Medicare  PCP: none  Locations to be seen: [facility] Anticoagulation/Pharmacotherapy Clinic at [phone], fax [phone]     Jessica Raphael, JimD

## 2021-09-02 ENCOUNTER — DOCUMENTATION (OUTPATIENT)
Dept: VASCULAR LAB | Facility: MEDICAL CENTER | Age: 86
End: 2021-09-02

## 2021-09-02 NOTE — PROGRESS NOTES
[facility] Heart and Vascular Health and Pharmacotherapy Programs     Received anticoagulation referral for warfarin due to hx of a-fib from Dr. Jean Capps on 8-10-21.     6th call  Per last conversation, pt was in [place] and unable to schedule appt.     Attempted to contact pt to see if she is back in [place]. LVM to return my call. Sent my chart letter.      Insurance: Medicare  PCP: none  Locations to be seen: [facility] Anticoagulation/Pharmacotherapy Clinic at [phone], fax [phone]    David Magana, JimD

## 2021-09-02 NOTE — LETTER
Lindsay Landrum  [address] [place] [address]    09/02/21    Dear Lindsay Landrum ,    We have been unsuccessful in our six attempts to contact you regarding your Anticoagulation Service appointments. Warfarin is a potent blood-thinning agent that requires monitoring to ensure that the dosage is correct for your body.  If it isn't, you could develop serious, sometimes life-threatening bleeding problems or life-threatening blood clots or stroke could result.    To monitor you effectively, we need to be able to communicate with you.  This is a requirement to be followed by our Service.       If you repeatedly fail to keep your lab appointments, you are at risk of being discharged from the Anticoagulation Service.    It is extremely important that you contact the clinic as soon as possible to arrange appropriate follow up.  We are open Monday-Friday 8 am until 5 pm.  You may reach our Service at [phone].           Sincerely,           Aly Escobedo, PharmD, [facility]  Clinic Supervisor  [facility]  Outpatient Anticoagulation Service

## 2021-09-08 ENCOUNTER — DOCUMENTATION (OUTPATIENT)
Dept: VASCULAR LAB | Facility: MEDICAL CENTER | Age: 86
End: 2021-09-08

## 2021-09-08 NOTE — PROGRESS NOTES
[facility] Heart and Vascular Health and Pharmacotherapy Programs     Received anticoagulation referral for warfarin due to hx of a-fib from Dr. Jean Capps on 8-10-21.     7th call    Per last conversation, pt was in [place] and unable to schedule appt.     Attempted to contact pt to see if she is back in [place]. LVM to return my call. MyChart letter has been sent.     Insurance: Maged BOYD  PCP: None  Locations to be seen: [facility] Anticoagulation/Pharmacotherapy Clinic at [phone], fax [phone]    Chacho Domingo, JimD

## 2021-09-10 ENCOUNTER — DOCUMENTATION (OUTPATIENT)
Dept: VASCULAR LAB | Facility: MEDICAL CENTER | Age: 86
End: 2021-09-10

## 2021-09-11 NOTE — PROGRESS NOTES
[facility] Heart and Vascular Health and Pharmacotherapy Programs     Received anticoagulation referral for warfarin due to hx of a-fib from Dr. Jean Capps on 8-10-21.    Patient reports that she is currently in [place], and does not plan to return to [place] until mid October.   She would like a call back around that time.       Insurance: Maged BOYD  PCP: None  Locations to be seen: [facility] Anticoagulation/Pharmacotherapy Clinic at [phone], fax [phone]      Shane FernandezD

## 2021-10-15 ENCOUNTER — DOCUMENTATION (OUTPATIENT)
Dept: VASCULAR LAB | Facility: MEDICAL CENTER | Age: 86
End: 2021-10-15

## 2021-10-15 NOTE — PROGRESS NOTES
[facility] Heart and Vascular Health and Pharmacotherapy Programs     Received anticoagulation referral for warfarin due to hx of a-fib from Dr. Jean Capps on 8-10-21.     Patient reports that she is currently in [place], and does not plan to return to [place] until mid October.   She would like a call back around that time.      Called pt to f/u on the above - no answer. LVM.    Insurance: Maged BOYD  PCP: None  Locations to be seen: [facility] Anticoagulation/Pharmacotherapy Clinic at [phone], fax [phone]    Jim LoboD

## 2021-10-20 ENCOUNTER — SUPERVISING PHYSICIAN REVIEW (OUTPATIENT)
Dept: VASCULAR LAB | Facility: MEDICAL CENTER | Age: 86
End: 2021-10-20

## 2021-10-20 ENCOUNTER — ANTICOAGULATION VISIT (OUTPATIENT)
Dept: VASCULAR LAB | Facility: MEDICAL CENTER | Age: 86
End: 2021-10-20
Attending: INTERNAL MEDICINE
Payer: MEDICARE

## 2021-10-20 DIAGNOSIS — Z79.01 CHRONIC ANTICOAGULATION: Chronic | ICD-10-CM

## 2021-10-20 DIAGNOSIS — D68.69 SECONDARY HYPERCOAGULABLE STATE (HCC): ICD-10-CM

## 2021-10-20 DIAGNOSIS — I48.11 LONGSTANDING PERSISTENT ATRIAL FIBRILLATION (HCC): ICD-10-CM

## 2021-10-20 DIAGNOSIS — K92.1 GASTROINTESTINAL HEMORRHAGE WITH MELENA: ICD-10-CM

## 2021-10-20 LAB — INR PPP: 2.5 (ref 2–3.5)

## 2021-10-20 PROCEDURE — 85610 PROTHROMBIN TIME: CPT

## 2021-10-20 PROCEDURE — 99212 OFFICE O/P EST SF 10 MIN: CPT | Performed by: NURSE PRACTITIONER

## 2021-10-20 RX ORDER — POTASSIUM CHLORIDE 750 MG/1
10 TABLET, EXTENDED RELEASE ORAL 2 TIMES DAILY
COMMUNITY

## 2021-10-20 RX ORDER — DILTIAZEM HCL 90 MG
90 TABLET ORAL DAILY
COMMUNITY

## 2021-10-20 NOTE — PROGRESS NOTES
Anticoagulation Summary  As of 10/20/2021    INR goal:  2.0-3.0   TTR:  --   INR used for dosin.50 (10/20/2021)   Warfarin maintenance plan:  5 mg (5 mg x 1) every day   Weekly warfarin total:  35 mg   Plan last modified:  KALA Enriquez (10/20/2021)   Next INR check:  11/3/2021   Target end date:  Indefinite    Indications    AF (atrial fibrillation) (HCC) [I48.91]  Gastrointestinal hemorrhage with melena [K92.1]             Anticoagulation Episode Summary     INR check location:      Preferred lab:      Send INR reminders to:      Comments:        Anticoagulation Care Providers     Provider Role Specialty Phone number    [facility] Responsible  [phone]                Refer to Patient Findings for HPI:  Patient Findings     Negatives:  Signs/symptoms of thrombosis, Signs/symptoms of bleeding, Laboratory test error suspected, Change in health, Change in alcohol use, Change in activity, Upcoming invasive procedure, Emergency department visit, Upcoming dental procedure, Missed doses, Extra doses, Change in medications, Change in diet/appetite, Hospital admission, Bruising, Other complaints    Comments:  She is visiting from [facility]. Will be in [place] a few more weeks. Has been on warfarin for several years. CHADSVASC at least 4 (age, sex, chf). No hx of CVA/TIA. GIB in . Hx of MV repair. Last INR was ~2 weeks ago. She reports that it was a little low and she took a one time dose increase. Aware of DOACs but prefers warfarin for now.          There were no vitals filed for this visit.   pt declined vitals    Verified current warfarin dosing schedule.    Medications reconciled   Pt is not on antiplatelet therapy      A/P   INR  -therapeutic.     Warfarin dosing recommendation: Continue current regimen.    Pt educated to contact our clinic with any changes in medications or s/s of bleeding or thrombosis. Pt is aware to seek immediate medical attention for falls, head  injury or deep cuts.    Follow up appointment in 2 week(s).    SUZIE Enriquez.

## 2021-10-21 NOTE — PROGRESS NOTES
Initial anticoag note and most recent discharge summary reviewed.  Patient with afib and chads vasc = at least 4 along with history of mitral valve disease    Pending further recommendations, we will continue with indefinite anticoagulation with warfarin as directed at discharge    Will defer all management of rhythm, rate, and other cv issues, aside from anticoagulation, to cards and/or PCP    Michael Bloch, MD  Anticoagulation Clinic

## 2021-10-22 LAB — INR BLD: 2.5 (ref 0.9–1.2)

## 2021-10-27 ENCOUNTER — PATIENT MESSAGE (OUTPATIENT)
Dept: CARDIOLOGY | Facility: MEDICAL CENTER | Age: 86
End: 2021-10-27

## 2021-11-01 NOTE — PATIENT COMMUNICATION
FW: Referral jose rafael Landrum  Received: Today  AGA Meadows R.N.  She can have post-op follow up anywhere from 21-75 days post procedure.     Echo timing is fine. First available can be moved up to valve post-op slot if we have any openings before 12/1. I can check my schedule when I get back to clinic this afternoon. SC

## 2021-11-03 ENCOUNTER — APPOINTMENT (OUTPATIENT)
Dept: VASCULAR LAB | Facility: MEDICAL CENTER | Age: 86
End: 2021-11-03
Attending: INTERNAL MEDICINE
Payer: MEDICARE

## 2021-11-04 ENCOUNTER — TELEPHONE (OUTPATIENT)
Dept: CARDIOLOGY | Facility: MEDICAL CENTER | Age: 86
End: 2021-11-04

## 2021-11-04 ENCOUNTER — NON-PROVIDER VISIT (OUTPATIENT)
Dept: CARDIOLOGY | Facility: MEDICAL CENTER | Age: 86
End: 2021-11-04
Payer: MEDICARE

## 2021-11-04 ENCOUNTER — OFFICE VISIT (OUTPATIENT)
Dept: CARDIOLOGY | Facility: MEDICAL CENTER | Age: 86
End: 2021-11-04
Payer: MEDICARE

## 2021-11-04 ENCOUNTER — HOSPITAL ENCOUNTER (OUTPATIENT)
Dept: LAB | Facility: MEDICAL CENTER | Age: 86
End: 2021-11-04
Attending: INTERNAL MEDICINE
Payer: MEDICARE

## 2021-11-04 ENCOUNTER — HOSPITAL ENCOUNTER (OUTPATIENT)
Dept: LAB | Facility: MEDICAL CENTER | Age: 86
End: 2021-11-04
Attending: NURSE PRACTITIONER
Payer: MEDICARE

## 2021-11-04 ENCOUNTER — APPOINTMENT (OUTPATIENT)
Dept: VASCULAR LAB | Facility: MEDICAL CENTER | Age: 86
End: 2021-11-04
Attending: INTERNAL MEDICINE
Payer: MEDICARE

## 2021-11-04 VITALS
DIASTOLIC BLOOD PRESSURE: 56 MMHG | HEART RATE: 70 BPM | HEIGHT: 62 IN | BODY MASS INDEX: 19.14 KG/M2 | SYSTOLIC BLOOD PRESSURE: 108 MMHG | RESPIRATION RATE: 12 BRPM | OXYGEN SATURATION: 97 % | WEIGHT: 104 LBS

## 2021-11-04 DIAGNOSIS — I48.92 ATRIAL FLUTTER, UNSPECIFIED TYPE (HCC): ICD-10-CM

## 2021-11-04 DIAGNOSIS — I48.0 PAROXYSMAL ATRIAL FIBRILLATION (HCC): Chronic | ICD-10-CM

## 2021-11-04 DIAGNOSIS — N18.30 STAGE 3 CHRONIC KIDNEY DISEASE, UNSPECIFIED WHETHER STAGE 3A OR 3B CKD: Chronic | ICD-10-CM

## 2021-11-04 DIAGNOSIS — R06.00 DYSPNEA, UNSPECIFIED TYPE: ICD-10-CM

## 2021-11-04 DIAGNOSIS — Z95.0 CARDIAC PACEMAKER IN SITU: ICD-10-CM

## 2021-11-04 DIAGNOSIS — Z79.01 CHRONIC ANTICOAGULATION: Chronic | ICD-10-CM

## 2021-11-04 DIAGNOSIS — I35.1 MODERATE AORTIC REGURGITATION: ICD-10-CM

## 2021-11-04 DIAGNOSIS — I44.2 AV BLOCK, COMPLETE (HCC): ICD-10-CM

## 2021-11-04 DIAGNOSIS — E78.49 OTHER HYPERLIPIDEMIA: Chronic | ICD-10-CM

## 2021-11-04 DIAGNOSIS — Z98.890 HISTORY OF REPAIR OF MITRAL VALVE: ICD-10-CM

## 2021-11-04 DIAGNOSIS — I50.32 CHRONIC DIASTOLIC HEART FAILURE (HCC): ICD-10-CM

## 2021-11-04 DIAGNOSIS — I49.3 PVC (PREMATURE VENTRICULAR CONTRACTION): ICD-10-CM

## 2021-11-04 DIAGNOSIS — J90 PLEURAL EFFUSION: ICD-10-CM

## 2021-11-04 PROBLEM — I34.0 SEVERE MITRAL REGURGITATION: Status: RESOLVED | Noted: 2020-10-04 | Resolved: 2021-11-04

## 2021-11-04 PROBLEM — N17.9 AKI (ACUTE KIDNEY INJURY) (HCC): Status: RESOLVED | Noted: 2021-08-15 | Resolved: 2021-11-04

## 2021-11-04 PROBLEM — I24.89 DEMAND ISCHEMIA (HCC): Status: RESOLVED | Noted: 2021-08-15 | Resolved: 2021-11-04

## 2021-11-04 PROBLEM — I42.0 DILATED CARDIOMYOPATHY (HCC): Status: RESOLVED | Noted: 2021-07-29 | Resolved: 2021-11-04

## 2021-11-04 PROBLEM — D68.69 SECONDARY HYPERCOAGULABLE STATE (HCC): Status: RESOLVED | Noted: 2021-10-20 | Resolved: 2021-11-04

## 2021-11-04 PROBLEM — K92.1 GASTROINTESTINAL HEMORRHAGE WITH MELENA: Status: RESOLVED | Noted: 2020-10-03 | Resolved: 2021-11-04

## 2021-11-04 LAB
ALBUMIN SERPL BCP-MCNC: 4.1 G/DL (ref 3.2–4.9)
ALBUMIN/GLOB SERPL: 1.2 G/DL
ALP SERPL-CCNC: 136 U/L (ref 30–99)
ALT SERPL-CCNC: 5 U/L (ref 2–50)
ANION GAP SERPL CALC-SCNC: 14 MMOL/L (ref 7–16)
AST SERPL-CCNC: 18 U/L (ref 12–45)
BILIRUB SERPL-MCNC: 0.6 MG/DL (ref 0.1–1.5)
BUN SERPL-MCNC: 27 MG/DL (ref 8–22)
CALCIUM SERPL-MCNC: 9.7 MG/DL (ref 8.5–10.5)
CHLORIDE SERPL-SCNC: 105 MMOL/L (ref 96–112)
CO2 SERPL-SCNC: 25 MMOL/L (ref 20–33)
CREAT SERPL-MCNC: 1.9 MG/DL (ref 0.5–1.4)
EKG IMPRESSION: NORMAL
ERYTHROCYTE [DISTWIDTH] IN BLOOD BY AUTOMATED COUNT: 57 FL (ref 35.9–50)
GLOBULIN SER CALC-MCNC: 3.4 G/DL (ref 1.9–3.5)
GLUCOSE SERPL-MCNC: 110 MG/DL (ref 65–99)
HCT VFR BLD AUTO: 37.9 % (ref 37–47)
HGB BLD-MCNC: 11.8 G/DL (ref 12–16)
INR PPP: 2.71 (ref 0.87–1.13)
MAGNESIUM SERPL-MCNC: 2.4 MG/DL (ref 1.5–2.5)
MCH RBC QN AUTO: 30.7 PG (ref 27–33)
MCHC RBC AUTO-ENTMCNC: 31.1 G/DL (ref 33.6–35)
MCV RBC AUTO: 98.7 FL (ref 81.4–97.8)
NT-PROBNP SERPL IA-MCNC: 3467 PG/ML (ref 0–125)
PLATELET # BLD AUTO: 238 K/UL (ref 164–446)
PMV BLD AUTO: 10 FL (ref 9–12.9)
POTASSIUM SERPL-SCNC: 4.6 MMOL/L (ref 3.6–5.5)
PROT SERPL-MCNC: 7.5 G/DL (ref 6–8.2)
PROTHROMBIN TIME: 28 SEC (ref 12–14.6)
RBC # BLD AUTO: 3.84 M/UL (ref 4.2–5.4)
SODIUM SERPL-SCNC: 144 MMOL/L (ref 135–145)
WBC # BLD AUTO: 5.8 K/UL (ref 4.8–10.8)

## 2021-11-04 PROCEDURE — 36415 COLL VENOUS BLD VENIPUNCTURE: CPT

## 2021-11-04 PROCEDURE — 83880 ASSAY OF NATRIURETIC PEPTIDE: CPT

## 2021-11-04 PROCEDURE — 85610 PROTHROMBIN TIME: CPT

## 2021-11-04 PROCEDURE — 80053 COMPREHEN METABOLIC PANEL: CPT

## 2021-11-04 PROCEDURE — 83735 ASSAY OF MAGNESIUM: CPT

## 2021-11-04 PROCEDURE — 99214 OFFICE O/P EST MOD 30 MIN: CPT | Performed by: NURSE PRACTITIONER

## 2021-11-04 PROCEDURE — 85027 COMPLETE CBC AUTOMATED: CPT

## 2021-11-04 PROCEDURE — 93279 PRGRMG DEV EVAL PM/LDLS PM: CPT | Performed by: INTERNAL MEDICINE

## 2021-11-04 PROCEDURE — 93000 ELECTROCARDIOGRAM COMPLETE: CPT | Mod: 59 | Performed by: STUDENT IN AN ORGANIZED HEALTH CARE EDUCATION/TRAINING PROGRAM

## 2021-11-04 RX ORDER — PANTOPRAZOLE SODIUM 40 MG/1
40 FOR SUSPENSION ORAL DAILY
COMMUNITY

## 2021-11-04 ASSESSMENT — FIBROSIS 4 INDEX: FIB4 SCORE: 2.8

## 2021-11-05 ENCOUNTER — TELEPHONE (OUTPATIENT)
Dept: CARDIOLOGY | Facility: MEDICAL CENTER | Age: 86
End: 2021-11-05

## 2021-11-05 ENCOUNTER — DOCUMENTATION (OUTPATIENT)
Dept: VASCULAR LAB | Facility: MEDICAL CENTER | Age: 86
End: 2021-11-05

## 2021-11-05 DIAGNOSIS — I50.32 CHRONIC DIASTOLIC HEART FAILURE (HCC): ICD-10-CM

## 2021-11-05 DIAGNOSIS — R06.00 DYSPNEA, UNSPECIFIED TYPE: ICD-10-CM

## 2021-11-05 DIAGNOSIS — N18.30 STAGE 3 CHRONIC KIDNEY DISEASE, UNSPECIFIED WHETHER STAGE 3A OR 3B CKD: ICD-10-CM

## 2021-11-05 RX ORDER — FUROSEMIDE 40 MG/1
40 TABLET ORAL 2 TIMES DAILY
Qty: 180 TABLET | Refills: 3 | Status: SHIPPED | OUTPATIENT
Start: 2021-11-05

## 2021-11-05 ASSESSMENT — ENCOUNTER SYMPTOMS
MYALGIAS: 0
PND: 0
ABDOMINAL PAIN: 0
ORTHOPNEA: 0
FEVER: 0
COUGH: 0
SHORTNESS OF BREATH: 1
CLAUDICATION: 0
DIZZINESS: 1
PALPITATIONS: 0

## 2021-11-05 NOTE — TELEPHONE ENCOUNTER
Notified patient of recommendations. She states she has a nephrologist, I advised her to contact him/her to discuss labs further.     Repeat labs ordered. Increased prescription sent to [facility] pharmacy.

## 2021-11-05 NOTE — PROGRESS NOTES
Called pt regarding missed anticoag appt - no answer. LVM asking pt to c/b to reschedule.     Will f/u at a later time.    Jmi LoboD

## 2021-11-05 NOTE — TELEPHONE ENCOUNTER
----- Message from AGA Meadows sent at 11/5/2021  9:49 AM PDT -----  Please call patient regarding stat labs from yesterday.    She has elevated BNP in the setting post clip. She has CKD that is worsened and slightly anemic. Does she see a nephrologist in [facility]?    She needs to increase her diuretic to 40 mg BID (take one in AM, one at lunch). Continue with same K. K is stable with CKD.    Recommend nephrology visit to discuss elevated BNP, CKD, and anemia for further recommendations.    Repeat bnp and bmp in 2 weeks after increase in furosemide alongside daily weights, BP, and symptom check.     [place] is [place]. I don't advise this until her symptoms improve. She may need to be seen there urgently by her MD in [facility] if she does decide to go. SC

## 2021-11-05 NOTE — PROGRESS NOTES
Chief Complaint   Patient presents with   • Atrial Fibrillation   • Cardiomyopathy (Non-ischemic)       Subjective     Lindsay Landrum is a 88 y.o. female who presents today for 1 month post Priya clip placement at [facility].    She is a patient of Dr. Capps and Dr. Mckenna in our office. 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    She presents today alone. She lives between [place], [place], and [place].    She has family in [place]. She has doctors in each of the [facility]. She is leaving for [place] this Sunday.    She has been having progressive weakness, fatigue, and shortness of breath since the procedure. She feels like she got worse since she came to [place].    She has no chest pain, edema, dizziness/lightheadedness, or palpitations.    Past Medical History:   Diagnosis Date   • ACP (advance care planning) 8/15/2021   • AF (atrial fibrillation) (HCC) 5/21/2012   • Anemia    • Bowel habit changes     takes miralax   • Breath shortness     with a fib   • Cataract     surgery   • Chronic anticoagulation 5/21/2012   • Heart burn    • Heart valve disease    • Hemorrhagic disorder (HCC)     transfusions x2   • High cholesterol    • History of blood transfusion    • Hyperlipidemia 5/21/2012   • Hypothyroidism 5/21/2012   • Indigestion    • Pacemaker    • Urinary incontinence     after procedure   • Vitamin d deficiency 5/21/2012     Past Surgical History:   Procedure Laterality Date   • PB COLONOSCOPY,DIAGNOSTIC N/A 10/5/2020    Procedure: COLONOSCOPY;  Surgeon: J Carlos Palacios M.D.;  Location: SURGERY SAME DAY [facility];  Service: Gastroenterology   • GASTROSCOPY N/A 10/5/2020    Procedure: GASTROSCOPY;  Surgeon: J Carlos Palacios M.D.;  Location: SURGERY SAME DAY [facility];  Service:  Gastroenterology   • COLONOSCOPY WITH POLYP N/A 10/5/2020    Procedure: COLONOSCOPY, WITH POLYPECTOMY;  Surgeon: J Carlos Palacios M.D.;  Location: SURGERY SAME DAY [facility];  Service: Gastroenterology   • ABDOMINAL HYSTERECTOMY TOTAL     • CATARACT EXTRACTION WITH IOL     • GYN SURGERY      hysterectomy   • OTHER CARDIAC SURGERY      pacemaker     Family History   Problem Relation Age of Onset   • No Known Problems Mother    • Heart Disease Father    • Hypertension Father    • Lupus Sister      Social History     Socioeconomic History   • Marital status: Single     Spouse name: Not on file   • Number of children: Not on file   • Years of education: Not on file   • Highest education level: Not on file   Occupational History   • Not on file   Tobacco Use   • Smoking status: Never Smoker   • Smokeless tobacco: Never Used   Vaping Use   • Vaping Use: Never used   Substance and Sexual Activity   • Alcohol use: Yes   • Drug use: No   • Sexual activity: Not on file   Other Topics Concern   • Not on file   Social History Narrative   • Not on file     Social Determinants of Health     Financial Resource Strain:    • Difficulty of Paying Living Expenses:    Food Insecurity:    • Worried About Running Out of Food in the Last Year:    • Ran Out of Food in the Last Year:    Transportation Needs:    • Lack of Transportation (Medical):    • Lack of Transportation (Non-Medical):    Physical Activity:    • Days of Exercise per Week:    • Minutes of Exercise per Session:    Stress:    • Feeling of Stress :    Social Connections:    • Frequency of Communication with Friends and Family:    • Frequency of Social Gatherings with Friends and Family:    • Attends Congregation Services:    • Active Member of Clubs or Organizations:    • Attends Club or Organization Meetings:    • Marital Status:    Intimate Partner Violence:    • Fear of Current or Ex-Partner:    • Emotionally Abused:    • Physically Abused:    • Sexually Abused:      No  "Known Allergies  Outpatient Encounter Medications as of 11/4/2021   Medication Sig Dispense Refill   • pantoprazole (PROTONIX) 40 MG Pack Take 40 mg by mouth every day.     • potassium chloride SA (K-DUR) 10 MEQ Tab CR Take 10 mEq by mouth 2 times a day.     • DILTIAZem (CARDIZEM) 90 MG Tab Take 90 mg by mouth every day.     • warfarin (COUMADIN) 4 MG Tab Take 1 tablet by mouth every day.     • [DISCONTINUED] furosemide (LASIX) 40 MG Tab Take 1 tablet by mouth every day. 90 tablet 3   • ferrous sulfate 325 (65 Fe) MG tablet Take 1 Tab by mouth every day. 30 Tab 0   • levothyroxine (SYNTHROID) 75 MCG Tab Take 75 mcg by mouth every morning.     • VITAMIN D PO Take 1 Tab by mouth every morning.     • CALCIUM PO Take 1 Tab by mouth every morning.     • MAGNESIUM PO Take 1 Tab by mouth every day.     • folic acid (FOLVITE) 1 MG Tab Take 1 mg by mouth every day.       No facility-administered encounter medications on file as of 11/4/2021.     Review of Systems   Constitutional: Positive for malaise/fatigue. Negative for fever.   Respiratory: Positive for shortness of breath. Negative for cough.    Cardiovascular: Negative for chest pain, palpitations, orthopnea, claudication, leg swelling and PND.   Gastrointestinal: Negative for abdominal pain.   Musculoskeletal: Negative for myalgias.   Neurological: Positive for dizziness.              Objective     /56 (BP Location: Left arm, Patient Position: Sitting, BP Cuff Size: Adult)   Pulse 70   Resp 12   Ht 1.575 m (5' 2\")   Wt 47.2 kg (104 lb)   SpO2 97%   BMI 19.02 kg/m²     Physical Exam  Vitals and nursing note reviewed.   Constitutional:       Appearance: Normal appearance. She is well-developed and normal weight.   HENT:      Head: Normocephalic and atraumatic.   Neck:      Vascular: No JVD.   Cardiovascular:      Rate and Rhythm: Normal rate and regular rhythm.      Pulses: Normal pulses.      Heart sounds: Normal heart sounds.   Pulmonary:      Effort: " Pulmonary effort is normal.      Breath sounds: Examination of the right-lower field reveals rhonchi. Examination of the left-lower field reveals rhonchi. Rhonchi present.   Musculoskeletal:         General: Normal range of motion.   Skin:     General: Skin is warm and dry.      Capillary Refill: Capillary refill takes less than 2 seconds.   Neurological:      General: No focal deficit present.      Mental Status: She is alert and oriented to person, place, and time. Mental status is at baseline.   Psychiatric:         Mood and Affect: Mood normal.         Behavior: Behavior normal.         Thought Content: Thought content normal.         Judgment: Judgment normal.                Assessment & Plan     1. Paroxysmal atrial fibrillation (HCC)  EKG    CBC WITHOUT DIFFERENTIAL    Prothrombin Time    Cardiac Event Monitor   2. Chronic anticoagulation  CBC WITHOUT DIFFERENTIAL    Prothrombin Time   3. Chronic diastolic heart failure (HCC)  EC-ECHOCARDIOGRAM COMPLETE W/O CONT    CBC WITHOUT DIFFERENTIAL    Comp Metabolic Panel    MAGNESIUM    proBrain Natriuretic Peptide, NT   4. History of repair of mitral valve     5. Pleural effusion  DX-CHEST-2 VIEWS   6. Dyspnea, unspecified type     7. Moderate aortic regurgitation     8. Stage 3 chronic kidney disease, unspecified whether stage 3a or 3b CKD (HCC)     9. Other hyperlipidemia         Medical Decision Making: Today's Assessment/Status/Plan:      1. Post montana clip placement X2 with valvular cardiomyopathy  -managed by [facility] and our office  -worsening HF symptoms of shortness of breath, fatigue  -repeat echo pending  -pleural effusion noted post op, repeat CXR tomorrow for review  -order STAT labs  -call with results  -urged to stay in town until feeling better, but she refuses at this time  -cont furosemide for now    2. PAF on chronic anticoagulation with PPM placement  -no palpitations but shortness of breath new  -recommend biotel for afib burden,  PPM is leadless (not affected by montana clip) and unable to review afib burden on device check today  -ventricular paced 99% of the time, very dependent  -cont dilt, warfarin  -INR per anticoagulation clinic    3. CKD with anemia  -noted on labs  -no nephrologist per patient  -recommend nephrology apt  -follow with diuretic therapy    Patient is to follow up with Ebony COLON  in 1 month with labs, echo, CXR, and heart monitor.

## 2021-11-08 ENCOUNTER — ANTICOAGULATION MONITORING (OUTPATIENT)
Dept: VASCULAR LAB | Facility: MEDICAL CENTER | Age: 86
End: 2021-11-08

## 2021-11-08 NOTE — LETTER
Lindsay Landrum  [address] [place] [address]    11/08/21    Dear Lindsay Landrum ,    We have been unsuccessful in our attempts to contact you regarding your Anticoagulation Service appointments. Warfarin is a potent blood-thinning agent that requires monitoring to ensure that the dosage is correct for your body.  If it isn't, you could develop serious, sometimes life-threatening bleeding problems or life-threatening blood clots or stroke could result.    To monitor you effectively, we need to be able to communicate with you.  This is a requirement to be followed by our Service.       If you repeatedly fail to keep your lab appointments, you are at risk of being discharged from the Anticoagulation Service.    It is extremely important that you contact the clinic as soon as possible to arrange appropriate follow up.  We are open Monday-Friday 8 am until 5 pm.  You may reach our Service at [phone].           Sincerely,           Aly Escobedo, PharmD, [facility]  Clinic Supervisor  [facility]  Outpatient Anticoagulation Service

## 2021-11-08 NOTE — PROGRESS NOTES
Called pt regarding missed anticoag appt - no answer. LVM asking pt to c/b to reschedule.      Will f/u at a later time. Sent letter.      Jim BrionesD

## 2021-11-11 ENCOUNTER — TELEPHONE (OUTPATIENT)
Dept: CARDIOLOGY | Facility: MEDICAL CENTER | Age: 86
End: 2021-11-11

## 2021-11-11 DIAGNOSIS — I48.0 PAROXYSMAL ATRIAL FIBRILLATION (HCC): Chronic | ICD-10-CM

## 2021-11-11 NOTE — TELEPHONE ENCOUNTER
Angus from [facility] called stating they received an EKG notification 11/11 @ 11:15     Best Contact Number: [phone]  Ref Numbe: [id number]    Thank you,    Shagufta OTERO

## 2021-11-22 ENCOUNTER — ANTICOAGULATION MONITORING (OUTPATIENT)
Dept: VASCULAR LAB | Facility: MEDICAL CENTER | Age: 86
End: 2021-11-22

## 2021-11-29 ENCOUNTER — DOCUMENTATION (OUTPATIENT)
Dept: VASCULAR LAB | Facility: MEDICAL CENTER | Age: 86
End: 2021-11-29

## 2021-11-29 ENCOUNTER — TELEPHONE (OUTPATIENT)
Dept: CARDIOLOGY | Facility: MEDICAL CENTER | Age: 86
End: 2021-11-29

## 2021-11-29 PROCEDURE — 93268 ECG RECORD/REVIEW: CPT | Performed by: INTERNAL MEDICINE

## 2021-11-29 NOTE — PROGRESS NOTES
[facility]    Patient reported she is being managed by [facility] Antico[facility] clinic in [facility] and will be living there until April. Upon returning to [place] she wishes to re-establish with our clinic.     Called [facility] cardiology Center and spoke with Edgar. They thought patient was still in [place] being managed by our clinic. Patient missed several calls from them previously. Informed them that Lindsay was now in [place]. They plan to call her again.     Called patient back but there was answer. Left message to have patient give us a call back.    David Magana, PharmD

## 2021-11-29 NOTE — TELEPHONE ENCOUNTER
----- Message -----   From: Jean Capps M.D.   Sent: 11/29/2021  12:32 PM PST   To: AGA Meadows     Zio patch report completed. Thanks.  ARNULFO.     Received: Today  AGA Meadows R.N.  Monitor shows 100% afib burden. How is she feeling? Is her shortness of breath improved? SC   =================================    Called pt and notified of monitor results showing 100% Afib. She reports that her SOB has not improved and she still gets BRADFORD and has to take frequent breaks when exerting herself. Asked pt if she was still taking increased dose of Lasix 40mg BID as instructed on 11/5, but pt reports that she never increased the dose and didn't realize she was supposed to. She has continued with 40mg daily. Advised to increase Lasix to 40mg BID, and complete non fasting labs 2 weeks after. BMP and BNP order mailed to pt at her current address: [address], [place], [place], [address]. Also instructed her to keep track of her daily weights.

## 2021-12-06 ENCOUNTER — TELEPHONE (OUTPATIENT)
Dept: VASCULAR LAB | Facility: MEDICAL CENTER | Age: 86
End: 2021-12-06

## 2021-12-06 NOTE — TELEPHONE ENCOUNTER
[facility]     Patient reported she is being managed by [facility] Anticoagulation clinic in [facility] and will be living there until April. Upon returning to [place] she wishes to re-establish with our clinic.      Called [facility] cardiology Center and spoke with Edgar. They thought patient was still in [place] being managed by our clinic. Patient missed several calls from them previously. Informed them that Lindsay was now in [place]. They plan to call her again.      Called pt regarding the above - she states she has not heard from the above [facility] clinic. Provided her w/ their contact info (Ph: [phone]). Pt states she will call and make f/u today.    Will f/u in 2 days to ensure this was done.    Chacho Domingo, JimD

## 2021-12-08 ENCOUNTER — DOCUMENTATION (OUTPATIENT)
Dept: VASCULAR LAB | Facility: MEDICAL CENTER | Age: 86
End: 2021-12-08

## 2021-12-08 DIAGNOSIS — Z79.01 CHRONIC ANTICOAGULATION: ICD-10-CM

## 2021-12-08 NOTE — PROGRESS NOTES
S/w patient regarding previous calls and establishment with anticoag clinic in [place] area.  She would prefer venipuncture INR's and for our clinic to manage going forward.  Standing order sent to [facility] in [place], [place] (fax [phone])  and patient will have done in the next 24 hours.    Santhosh Wood, PharmD, BCACP

## 2021-12-10 NOTE — PROGRESS NOTES
Called pt regarding the below - no answer. LVM.    Will f/u at a later time.    Chacho Domingo, JimD, BCACP

## 2021-12-13 ENCOUNTER — TELEPHONE (OUTPATIENT)
Dept: VASCULAR LAB | Facility: MEDICAL CENTER | Age: 86
End: 2021-12-13

## 2021-12-13 NOTE — TELEPHONE ENCOUNTER
Pt overdue for INR    Per previous conversation, pt was to get INR drawn in [facility]    S/w pt - she will go to the lab on 12/14    Jessica Raphael, JimD

## 2021-12-14 LAB — INR PPP: 2 (ref 2–3.5)

## 2021-12-15 ENCOUNTER — ANTICOAGULATION MONITORING (OUTPATIENT)
Dept: VASCULAR LAB | Facility: MEDICAL CENTER | Age: 86
End: 2021-12-15

## 2021-12-15 NOTE — PROGRESS NOTES
Anticoagulation Summary  As of 12/15/2021    INR goal:  2.0-3.0   TTR:  100.0 % (1.5 mo)   INR used for dosin.00 (2021)   Warfarin maintenance plan:  5 mg (5 mg x 1) every day   Weekly warfarin total:  35 mg   Plan last modified:  KALA Enriquez (10/20/2021)   Next INR check:     Target end date:  Indefinite    Indications    Gastrointestinal hemorrhage with melena (Resolved) [K92.1]  AF (atrial fibrillation) (HCC) [I48.91]  Secondary hypercoagulable state (HCC) (Resolved) [D68.69]             Anticoagulation Episode Summary     INR check location:      Preferred lab:      Send INR reminders to:      Comments:        Anticoagulation Care Providers     Provider Role Specialty Phone number    [facility] Responsible  [phone]          Refer to Anticoagulation Patient Findings for HPI    Spoke with patient Lindsay to report a therapeutic INR.      Pt is NOT on antiplatelet therapy.    Pt instructed to continue with current warfarin dosing regimen, confirms dosing.   Will follow up in 2 week(s).     David Magana, JimD

## 2021-12-21 ENCOUNTER — PATIENT MESSAGE (OUTPATIENT)
Dept: CARDIOLOGY | Facility: MEDICAL CENTER | Age: 86
End: 2021-12-21

## 2021-12-21 ENCOUNTER — TELEPHONE (OUTPATIENT)
Dept: CARDIOLOGY | Facility: MEDICAL CENTER | Age: 86
End: 2021-12-21

## 2021-12-21 DIAGNOSIS — I50.32 CHRONIC DIASTOLIC HEART FAILURE (HCC): ICD-10-CM

## 2021-12-21 DIAGNOSIS — N18.30 STAGE 3 CHRONIC KIDNEY DISEASE, UNSPECIFIED WHETHER STAGE 3A OR 3B CKD: ICD-10-CM

## 2021-12-21 DIAGNOSIS — R06.00 DYSPNEA, UNSPECIFIED TYPE: ICD-10-CM

## 2021-12-22 NOTE — TELEPHONE ENCOUNTER
----- Message from AGA Meadows sent at 12/21/2021 11:59 AM PST -----  BNP trending down. How are her symptoms? Cr elevated with CKD, follow up with nephrology as recommended in last apt.    If symptoms improving, can back down on diuretics if wanted. SC

## 2022-01-03 ENCOUNTER — DOCUMENTATION (OUTPATIENT)
Dept: VASCULAR LAB | Facility: MEDICAL CENTER | Age: 87
End: 2022-01-03

## 2022-01-03 NOTE — PROGRESS NOTES
[facility]      Pt was due to have PT/INR tested for warfarin monitoring last week.   Left message for patient to have INR checked ASAP.     Clinic phone number left for any questions or concerns.      Shane Shaw  PharmD

## 2022-01-06 ENCOUNTER — TELEPHONE (OUTPATIENT)
Dept: VASCULAR LAB | Facility: MEDICAL CENTER | Age: 87
End: 2022-01-06

## 2022-01-06 NOTE — TELEPHONE ENCOUNTER
Called pt regarding her overdue INR. Pt states that her  passed away this last week, so she forgot to get her INR tested.    Pt states she will go to the lab tomorrow.    Chacho Domingo, JimD, BCACP

## 2022-01-07 ENCOUNTER — ANTICOAGULATION MONITORING (OUTPATIENT)
Dept: VASCULAR LAB | Facility: MEDICAL CENTER | Age: 87
End: 2022-01-07

## 2022-01-07 DIAGNOSIS — I48.91 ATRIAL FIBRILLATION, UNSPECIFIED TYPE (HCC): ICD-10-CM

## 2022-01-07 LAB — INR PPP: 2 (ref 2–3.5)

## 2022-01-08 NOTE — PROGRESS NOTES
OP   Telephone Anticoagulation Service Note      Anticoagulation Summary  As of 2022    INR goal:  2.0-3.0   TTR:  100.0 % (2.3 mo)   INR used for dosin.00 (2022)   Warfarin maintenance plan:  5 mg (5 mg x 1) every day   Weekly warfarin total:  35 mg   Plan last modified:  KALA Enriquez (10/20/2021)   Next INR check:  2022   Target end date:  Indefinite    Indications    Gastrointestinal hemorrhage with melena (Resolved) [K92.1]  AF (atrial fibrillation) (HCC) [I48.91]  Secondary hypercoagulable state (HCC) (Resolved) [D68.69]             Anticoagulation Episode Summary     INR check location:      Preferred lab:      Send INR reminders to:      Comments:        Anticoagulation Care Providers     Provider Role Specialty Phone number    [facility] Responsible  [phone]        Anticoagulation Patient Findings     Attempted to leave patient a message regarding INR results we received today, but no answer and the VM is full.  INR is therapeutic today at 2.0.   Will send the patient a Signature message with the result and dosing information.   Instructed the patient to call the clinic with any changes to diet or medications, with any signs/sx of bleeding or clotting or with any questions or concerns.     Patient instructed to continue with the current warfarin dosing regimen, and asked to follow up again in  4 weeks.     Shane Shaw  PharmD

## 2022-03-23 ENCOUNTER — ANTICOAGULATION MONITORING (OUTPATIENT)
Dept: VASCULAR LAB | Facility: MEDICAL CENTER | Age: 87
End: 2022-03-23
Payer: MEDICARE

## 2022-03-23 ENCOUNTER — TELEPHONE (OUTPATIENT)
Dept: VASCULAR LAB | Facility: MEDICAL CENTER | Age: 87
End: 2022-03-23
Payer: MEDICARE

## 2022-03-23 NOTE — TELEPHONE ENCOUNTER
[facility]    Received anticoagulation clearance from [facility] in [facility] regarding upcoming colonoscopy.    Procedure date 3/31/22 but pt will make sure    Pt is on warfarin for atrial fibrillation; CHADSVASC is less than 6, no hx of stroke + pt has CKD, therefore bridging with Lovenox is NOT indicated    S/w pt on anticoagulation instructions. See anticoag encounter for details    Faxed clearance to [phone]; (will send to [place] to scan in media)    Jessica Gurrola, JimD

## 2022-03-23 NOTE — PROGRESS NOTES
Pt has colonoscopy on 3/31. Pt will hold 5 days prior to procedure. Bridging with Lovenox is NOT indicated d/t CHADsVASC less than 6, no prior hx of stroke, and hx of CKD.    Pt to then resume warfarin dosing immediately post op and get INR checked 1 week post op    Pt verbalized understanding    Jessica Gurrola, PharmD

## 2022-04-06 LAB — INR PPP: 1 (ref 2–3.5)

## 2022-04-07 ENCOUNTER — ANTICOAGULATION MONITORING (OUTPATIENT)
Dept: MEDICAL GROUP | Facility: MEDICAL CENTER | Age: 87
End: 2022-04-07
Payer: MEDICARE

## 2022-04-07 NOTE — PROGRESS NOTES
OP Telephone Anticoagulation Service Note    Date: 2022      Anticoagulation Summary  As of 2022    INR goal:  2.0-3.0   TTR:  43.7 % (5.3 mo)   INR used for dosin.00 (2022)   Warfarin maintenance plan:  5 mg (5 mg x 1) every day   Weekly warfarin total:  35 mg   Plan last modified:  KALA Enriquez (10/20/2021)   Next INR check:     Target end date:  Indefinite    Indications    Gastrointestinal hemorrhage with melena (Resolved) [K92.1]  AF (atrial fibrillation) (HCC) [I48.91]  Secondary hypercoagulable state (HCC) (Resolved) [D68.69]             Anticoagulation Episode Summary     INR check location:      Preferred lab:      Send INR reminders to:      Comments:        Anticoagulation Care Providers     Provider Role Specialty Phone number    [facility] Responsible  [phone]        Anticoagulation Patient Findings        Plan: Spoke with patient on the phone.   Patient is SUB therapeutic today.   She is holding her warfarin per GI until 4/15/22  DR. Osei, will call provider to clarify instructions. Per pt diagnosed with colon cancer which was oozing so she is holding her warfarin. She does not have a f/u.   Will have pt continue to hold warfarin as instructed. Left  for [facility] ANGELA Juares will await return call  Will follow-up with patient in 1 week(s).          Judith Soria, PharmD

## 2022-04-08 ENCOUNTER — TELEPHONE (OUTPATIENT)
Dept: VASCULAR LAB | Facility: MEDICAL CENTER | Age: 87
End: 2022-04-08
Payer: MEDICARE

## 2022-04-08 NOTE — TELEPHONE ENCOUNTER
Spoke with Basilia, from Dr. Wyman's office  Pt was to resume warfarin  4-5-2022    Pt notified    Rose Harrington, Clinical Pharmacist, CDE, CACP

## 2022-04-20 LAB — INR PPP: 2.1 (ref 2–3.5)

## 2022-04-21 ENCOUNTER — ANTICOAGULATION MONITORING (OUTPATIENT)
Dept: VASCULAR LAB | Facility: MEDICAL CENTER | Age: 87
End: 2022-04-21
Payer: MEDICARE

## 2022-04-22 NOTE — PROGRESS NOTES
OP Anticoagulation Service Note    Date: 2022    Anticoagulation Summary  As of 2022    INR goal:  2.0-3.0   TTR:  40.9 % (5.7 mo)   INR used for dosin.10 (2022)   Warfarin maintenance plan:  4 mg (4 mg x 1) every day   Weekly warfarin total:  28 mg   Plan last modified:  Josué Torres, PharmD (2022)   Next INR check:  2022   Target end date:  Indefinite    Indications    Gastrointestinal hemorrhage with melena (Resolved) [K92.1]  AF (atrial fibrillation) (HCC) [I48.91]  Secondary hypercoagulable state (HCC) (Resolved) [D68.69]             Anticoagulation Episode Summary     INR check location:      Preferred lab:      Send INR reminders to:      Comments:        Anticoagulation Care Providers     Provider Role Specialty Phone number    [facility] Responsible  [phone]        Anticoagulation Patient Findings      HPI:   The reason for today's call is to prevent morbidity and mortality from a blood clot and/or stroke and to reduce the risk of bleeding while on a anticoagulant.     PCP:  Pcp Pt States None  No address on file    Assessment:     • INR  therapeutic.       Current Outpatient Medications:   •  furosemide, 40 mg, Oral, BID  •  pantoprazole, 40 mg, Oral, DAILY  •  potassium chloride SA, 10 mEq, Oral, BID  •  DILTIAZem, 90 mg, Oral, DAILY  •  warfarin, 4 mg, Oral, DAILY  •  ferrous sulfate, 325 mg, Oral, DAILY  •  levothyroxine, 75 mcg, Oral, QAM  •  VITAMIN D PO, 1 Tablet, Oral, QAM  •  CALCIUM PO, 1 Tablet, Oral, QAM  •  MAGNESIUM PO, 1 Tablet, Oral, DAILY  •  folic acid, 1 mg, Oral, DAILY      Plan:     • Continue the same warfarin dose, as noted above.       Follow-up:     • Our protocol suggests we test in 1 weeks.        Additional information discussed with patient:     • Asked patient to please call the anticoagulation clinic if they have any signs/symptoms of bleeding and/or thrombosis or any changes to diet or medications.      National  recommendations regarding anticoagulation therapy:     The CHEST guidelines recommends frequent INR monitoring at regular intervals (a few days up to a max of 12 weeks) to ensure patients are on the proper dose of warfarin, and patients are not having any complications from therapy.  INRs can dramatically change over a short time period due to diet, medications, and medical conditions.     [facility]  Phone [phone] fax [phone]    This note was created using voice recognition software (Dragon). The accuracy of the dictation is limited by the abilities of the software. I have reviewed the note prior to signing, however some errors in grammar and context are still possible. If you have any questions related to this note please do not hesitate to contact our office.

## 2022-05-12 ENCOUNTER — TELEPHONE (OUTPATIENT)
Dept: VASCULAR LAB | Facility: MEDICAL CENTER | Age: 87
End: 2022-05-12
Payer: MEDICARE

## 2022-05-21 ENCOUNTER — NON-PROVIDER VISIT (OUTPATIENT)
Dept: CARDIOLOGY | Facility: MEDICAL CENTER | Age: 87
End: 2022-05-21
Payer: MEDICARE

## 2022-05-21 PROCEDURE — 93294 REM INTERROG EVL PM/LDLS PM: CPT | Performed by: INTERNAL MEDICINE

## 2022-05-25 NOTE — CARDIAC REMOTE MONITOR - SCAN
Device transmission reviewed. Device demonstrated appropriate function.       Electronically Signed by: Dina Conde M.D.    5/25/2022  2:11 PM

## 2022-06-15 ENCOUNTER — TELEPHONE (OUTPATIENT)
Dept: VASCULAR LAB | Facility: MEDICAL CENTER | Age: 87
End: 2022-06-15
Payer: MEDICARE

## 2022-06-15 NOTE — LETTER
Lindsay Landrum  [address] [place] [address]    06/15/22    Dear Lindsay Landrum ,    We have been unsuccessful in our attempts to contact you regarding your Anticoagulation Service appointments. Warfarin is a potent blood-thinning agent that requires monitoring to ensure that the dosage is correct for your body.  If it isn't, you could develop serious, sometimes life-threatening bleeding problems or life-threatening blood clots or stroke could result.    To monitor you effectively, we need to be able to communicate with you.  This is a requirement to be followed by our Service.       If you repeatedly fail to keep your lab appointments, you are at risk of being discharged from the Anticoagulation Service.    It is extremely important that you contact the clinic as soon as possible to arrange appropriate follow up.  We are open Monday-Friday 8 am until 5 pm.  You may reach our Service at [phone].           Sincerely,           Aly Escobedo, PharmD, [facility]  Clinic Supervisor  [facility]  Outpatient Anticoagulation Service

## 2022-06-15 NOTE — TELEPHONE ENCOUNTER
Left message for pt to have INR checked  2nd call  Letter sent  Rose Harrington, Clinical Pharmacist, CDE, CACP

## 2022-07-28 ENCOUNTER — TELEPHONE (OUTPATIENT)
Dept: VASCULAR LAB | Facility: MEDICAL CENTER | Age: 87
End: 2022-07-28
Payer: MEDICARE

## 2022-07-28 NOTE — LETTER
Lindsay Landrum  [address] [place] [address]    07/28/22    Dear Lindsay Landrum ,    We have been unsuccessful in our attempts to contact you regarding your Anticoagulation Service appointments. Warfarin is a potent blood-thinning agent that requires monitoring to ensure that the dosage is correct for your body.  If it isn't, you could develop serious, sometimes life-threatening bleeding problems or life-threatening blood clots or stroke could result.    To monitor you effectively, we need to be able to communicate with you.  This is a requirement to be followed by our Service.       If you repeatedly fail to keep your lab appointments, you are at risk of being discharged from the Anticoagulation Service.    It is extremely important that you contact the clinic as soon as possible to arrange appropriate follow up.  We are open Monday-Friday 8 am until 5 pm.  You may reach our Service at [phone].           Sincerely,           Aly Escobedo, PharmD, [facility]  Clinic Supervisor  [facility]  Outpatient Anticoagulation Service

## 2022-07-28 NOTE — TELEPHONE ENCOUNTER
Left message for pt to have INR checked  3rd call  2nd letter sent  Rose Harrington, Clinical Pharmacist, CDE, CACP

## 2022-08-03 DIAGNOSIS — Z79.01 CHRONIC ANTICOAGULATION: ICD-10-CM

## 2022-09-02 ENCOUNTER — DOCUMENTATION (OUTPATIENT)
Dept: VASCULAR LAB | Facility: MEDICAL CENTER | Age: 87
End: 2022-09-02
Payer: MEDICARE

## 2022-09-02 NOTE — PROGRESS NOTES
[facility]      Pt is over due for PT/INR for warfarin monitoring.   4th call.   Left message for patient to have INR checked ASAP.     Clinic phone number left for any questions or concerns.  [employer] message sent to the patient.      Shane FernandezD

## 2022-09-04 ENCOUNTER — NON-PROVIDER VISIT (OUTPATIENT)
Dept: CARDIOLOGY | Facility: MEDICAL CENTER | Age: 87
End: 2022-09-04
Payer: MEDICARE

## 2022-09-04 PROCEDURE — 93294 REM INTERROG EVL PM/LDLS PM: CPT | Performed by: INTERNAL MEDICINE

## 2022-09-06 NOTE — CARDIAC REMOTE MONITOR - SCAN
Device transmission reviewed. Device demonstrated appropriate function.       Electronically Signed by: Sukh Zamorano M.D.    9/7/2022  12:28 PM

## 2022-09-16 ENCOUNTER — DOCUMENTATION (OUTPATIENT)
Dept: VASCULAR LAB | Facility: MEDICAL CENTER | Age: 87
End: 2022-09-16
Payer: MEDICARE

## 2022-09-16 NOTE — PROGRESS NOTES
Called pt to remind her to check inr asap. Pt states she is in the hospital in [place]. Let her know we will f/u next week.

## 2022-11-03 ENCOUNTER — PATIENT MESSAGE (OUTPATIENT)
Dept: HEALTH INFORMATION MANAGEMENT | Facility: OTHER | Age: 87
End: 2022-11-03

## 2023-01-17 ENCOUNTER — ANTICOAGULATION MONITORING (OUTPATIENT)
Dept: VASCULAR LAB | Facility: MEDICAL CENTER | Age: 88
End: 2023-01-17
Payer: MEDICARE

## 2023-01-17 DIAGNOSIS — I48.0 PAROXYSMAL ATRIAL FIBRILLATION (HCC): Chronic | ICD-10-CM

## 2023-01-17 NOTE — PROGRESS NOTES
[facility]      Patient moved to [place] and established care there and will therefore be discharged from our clinic.       Shane Shaw  PharmD

## 2023-02-05 ENCOUNTER — NON-PROVIDER VISIT (OUTPATIENT)
Dept: CARDIOLOGY | Facility: MEDICAL CENTER | Age: 88
End: 2023-02-05
Payer: MEDICARE

## 2023-02-05 PROCEDURE — 93294 REM INTERROG EVL PM/LDLS PM: CPT | Performed by: INTERNAL MEDICINE

## 2023-02-06 NOTE — CARDIAC REMOTE MONITOR - SCAN
Device transmission reviewed. Device demonstrated appropriate function.       Electronically Signed by: Sukh Zamorano M.D.    2/11/2023  10:12 AM

## 2023-06-28 ENCOUNTER — NON-PROVIDER VISIT (OUTPATIENT)
Dept: CARDIOLOGY | Facility: MEDICAL CENTER | Age: 88
End: 2023-06-28
Payer: MEDICARE

## 2023-06-28 PROCEDURE — 93294 REM INTERROG EVL PM/LDLS PM: CPT | Performed by: INTERNAL MEDICINE

## 2023-06-28 NOTE — CARDIAC REMOTE MONITOR - SCAN
Device transmission reviewed. Device demonstrated appropriate function.       Electronically Signed by: Sukh Zamorano M.D.    6/28/2023  5:07 PM

## 2023-10-05 ENCOUNTER — NON-PROVIDER VISIT (OUTPATIENT)
Dept: CARDIOLOGY | Facility: MEDICAL CENTER | Age: 88
End: 2023-10-05
Payer: MEDICARE

## 2023-10-05 PROCEDURE — 93294 REM INTERROG EVL PM/LDLS PM: CPT | Performed by: INTERNAL MEDICINE

## 2023-10-06 NOTE — CARDIAC REMOTE MONITOR - SCAN
Device transmission reviewed. Device demonstrated appropriate function.       Electronically Signed by: Dina Conde M.D.    10/10/2023  3:23 PM

## 2023-10-09 ENCOUNTER — TELEPHONE (OUTPATIENT)
Dept: CARDIOLOGY | Facility: MEDICAL CENTER | Age: 88
End: 2023-10-09
Payer: MEDICARE

## (undated) DEVICE — DEVICE HEMOSTATIC CLIPPING RESOLUTION 360 DEGREES (20EA/BX)

## (undated) DEVICE — TUBE E-T HI-LO CUFF 7.0MM (10EA/PK)

## (undated) DEVICE — CATHERTER CLEAR SINGLE USE INJECTION THERAPY NEEDLE 25GA X 4MM  2.3MM X 240CM (5EA/BX)

## (undated) DEVICE — FILM CASSETTE ENDO

## (undated) DEVICE — TUBE CONNECTING SUCTION - CLEAR PLASTIC STERILE 72 IN (50EA/CA)

## (undated) DEVICE — CONTAINER, SPECIMEN, STERILE

## (undated) DEVICE — KIT PROCEDURE DOUBLE ENDO ONLY (5/CA)

## (undated) DEVICE — SET EXTENSION WITH 2 PORTS (48EA/CA) ***PART #2C8610 IS A SUBSTITUTE*****

## (undated) DEVICE — TRAP POLYP E-TRAP (25EA/BX)

## (undated) DEVICE — MASK WITH FACE SHIELD (25/BX 4BX/CA)

## (undated) DEVICE — ELECTRODE 850 FOAM ADHESIVE - HYDROGEL RADIOTRNSPRNT (50/PK)

## (undated) DEVICE — CANISTER SUCTION RIGID RED 1500CC (40EA/CA)

## (undated) DEVICE — BLOCK BITE ENDOSCOPIC 2809 - (100/BX) INTERMEDIATE

## (undated) DEVICE — CATHETER IV 20 GA X 1-1/4 ---SURG.& SDS ONLY--- (50EA/BX)

## (undated) DEVICE — TUBE NG SALEM SUMP 14FR (50EA/BX)

## (undated) DEVICE — TATTOO ENDOSCOPIC SPOT EX (10EA/BX)

## (undated) DEVICE — NEPTUNE 4 PORT MANIFOLD - (20/PK)

## (undated) DEVICE — TUBING O2 7FT TIP SMTH BORE - (50/CA)

## (undated) DEVICE — CANISTER SUCTION 3000ML MECHANICAL FILTER AUTO SHUTOFF MEDI-VAC NONSTERILE LF DISP  (40EA/CA)

## (undated) DEVICE — BITEBLOCK ENDOSCOPIC PEDI. - (25/BX)

## (undated) DEVICE — MANIFOLD NEPTUNE 1 PORT (20/PK)

## (undated) DEVICE — CANNULA O2 COMFORT SOFT EAR ADULT 7 FT TUBING (50/CA)

## (undated) DEVICE — SENSOR SPO2 NEO LNCS ADHESIVE (20/BX) SEE USER NOTES

## (undated) DEVICE — TUBING CLEARLINK DUO-VENT - C-FLO (48EA/CA)

## (undated) DEVICE — CAPTIVATOR II-15MM ROUND STIFF (40/BX)